# Patient Record
Sex: FEMALE | Race: BLACK OR AFRICAN AMERICAN | Employment: OTHER | URBAN - METROPOLITAN AREA
[De-identification: names, ages, dates, MRNs, and addresses within clinical notes are randomized per-mention and may not be internally consistent; named-entity substitution may affect disease eponyms.]

---

## 2020-04-27 ENCOUNTER — APPOINTMENT (OUTPATIENT)
Dept: CT IMAGING | Age: 70
DRG: 304 | End: 2020-04-27
Attending: EMERGENCY MEDICINE
Payer: MEDICARE

## 2020-04-27 ENCOUNTER — HOSPITAL ENCOUNTER (INPATIENT)
Age: 70
LOS: 11 days | Discharge: NURSING, REHAB OR LONG-TERM CARE | DRG: 304 | End: 2020-05-08
Attending: EMERGENCY MEDICINE | Admitting: FAMILY MEDICINE
Payer: MEDICARE

## 2020-04-27 ENCOUNTER — APPOINTMENT (OUTPATIENT)
Dept: GENERAL RADIOLOGY | Age: 70
DRG: 304 | End: 2020-04-27
Attending: EMERGENCY MEDICINE
Payer: MEDICARE

## 2020-04-27 DIAGNOSIS — R77.8 ELEVATED TROPONIN: ICD-10-CM

## 2020-04-27 DIAGNOSIS — R91.1 LUNG NODULE: ICD-10-CM

## 2020-04-27 DIAGNOSIS — R06.02 SOBOE (SHORTNESS OF BREATH ON EXERTION): ICD-10-CM

## 2020-04-27 DIAGNOSIS — I16.1 HYPERTENSIVE EMERGENCY: Primary | ICD-10-CM

## 2020-04-27 DIAGNOSIS — R94.31 ABNORMAL EKG: ICD-10-CM

## 2020-04-27 DIAGNOSIS — R91.8 LUNG INFILTRATE: ICD-10-CM

## 2020-04-27 LAB
ALBUMIN SERPL-MCNC: 3.2 G/DL (ref 3.4–5)
ALBUMIN/GLOB SERPL: 0.8 {RATIO} (ref 0.8–1.7)
ALP SERPL-CCNC: 68 U/L (ref 45–117)
ALT SERPL-CCNC: 25 U/L (ref 13–56)
ANION GAP SERPL CALC-SCNC: 6 MMOL/L (ref 3–18)
ARTERIAL PATENCY WRIST A: YES
AST SERPL-CCNC: 25 U/L (ref 10–38)
BASE EXCESS BLD CALC-SCNC: 8 MMOL/L
BASOPHILS # BLD: 0 K/UL (ref 0–0.1)
BASOPHILS NFR BLD: 0 % (ref 0–2)
BDY SITE: ABNORMAL
BILIRUB SERPL-MCNC: 0.4 MG/DL (ref 0.2–1)
BNP SERPL-MCNC: 939 PG/ML (ref 0–900)
BUN SERPL-MCNC: 15 MG/DL (ref 7–18)
BUN/CREAT SERPL: 15 (ref 12–20)
CALCIUM SERPL-MCNC: 8.6 MG/DL (ref 8.5–10.1)
CHLORIDE SERPL-SCNC: 103 MMOL/L (ref 100–111)
CK MB CFR SERPL CALC: 1.9 % (ref 0–4)
CK MB SERPL-MCNC: 2.5 NG/ML (ref 5–25)
CK SERPL-CCNC: 130 U/L (ref 26–192)
CO2 SERPL-SCNC: 33 MMOL/L (ref 21–32)
CREAT SERPL-MCNC: 0.98 MG/DL (ref 0.6–1.3)
DIFFERENTIAL METHOD BLD: ABNORMAL
EOSINOPHIL # BLD: 0 K/UL (ref 0–0.4)
EOSINOPHIL NFR BLD: 0 % (ref 0–5)
ERYTHROCYTE [DISTWIDTH] IN BLOOD BY AUTOMATED COUNT: 15.2 % (ref 11.6–14.5)
GAS FLOW.O2 O2 DELIVERY SYS: ABNORMAL L/MIN
GLOBULIN SER CALC-MCNC: 3.9 G/DL (ref 2–4)
GLUCOSE SERPL-MCNC: 152 MG/DL (ref 74–99)
HCO3 BLD-SCNC: 33.2 MMOL/L (ref 22–26)
HCT VFR BLD AUTO: 44.2 % (ref 35–45)
HGB BLD-MCNC: 13.4 G/DL (ref 12–16)
INR PPP: 1 (ref 0.8–1.2)
LYMPHOCYTES # BLD: 2 K/UL (ref 0.9–3.6)
LYMPHOCYTES NFR BLD: 28 % (ref 21–52)
MAGNESIUM SERPL-MCNC: 1.9 MG/DL (ref 1.6–2.6)
MCH RBC QN AUTO: 23.2 PG (ref 24–34)
MCHC RBC AUTO-ENTMCNC: 30.3 G/DL (ref 31–37)
MCV RBC AUTO: 76.6 FL (ref 74–97)
MONOCYTES # BLD: 0.3 K/UL (ref 0.05–1.2)
MONOCYTES NFR BLD: 5 % (ref 3–10)
NEUTS SEG # BLD: 4.7 K/UL (ref 1.8–8)
NEUTS SEG NFR BLD: 67 % (ref 40–73)
O2/TOTAL GAS SETTING VFR VENT: 21 %
PCO2 BLD: 56.6 MMHG (ref 35–45)
PH BLD: 7.38 [PH] (ref 7.35–7.45)
PLATELET # BLD AUTO: 359 K/UL (ref 135–420)
PMV BLD AUTO: 10.1 FL (ref 9.2–11.8)
PO2 BLD: 63 MMHG (ref 80–100)
POTASSIUM SERPL-SCNC: 3.1 MMOL/L (ref 3.5–5.5)
PROT SERPL-MCNC: 7.1 G/DL (ref 6.4–8.2)
PROTHROMBIN TIME: 12.9 SEC (ref 11.5–15.2)
RBC # BLD AUTO: 5.77 M/UL (ref 4.2–5.3)
SAO2 % BLD: 90 % (ref 92–97)
SERVICE CMNT-IMP: ABNORMAL
SODIUM SERPL-SCNC: 142 MMOL/L (ref 136–145)
SPECIMEN TYPE: ABNORMAL
TOTAL RESP. RATE, ITRR: 31
TROPONIN I BLD-MCNC: <0.04 NG/ML (ref 0–0.08)
TROPONIN I SERPL-MCNC: 0.05 NG/ML (ref 0–0.04)
WBC # BLD AUTO: 7.1 K/UL (ref 4.6–13.2)

## 2020-04-27 PROCEDURE — 71250 CT THORAX DX C-: CPT

## 2020-04-27 PROCEDURE — 71045 X-RAY EXAM CHEST 1 VIEW: CPT

## 2020-04-27 PROCEDURE — 96375 TX/PRO/DX INJ NEW DRUG ADDON: CPT

## 2020-04-27 PROCEDURE — 82803 BLOOD GASES ANY COMBINATION: CPT

## 2020-04-27 PROCEDURE — 70450 CT HEAD/BRAIN W/O DYE: CPT

## 2020-04-27 PROCEDURE — 74011250637 HC RX REV CODE- 250/637: Performed by: EMERGENCY MEDICINE

## 2020-04-27 PROCEDURE — 74011000250 HC RX REV CODE- 250: Performed by: EMERGENCY MEDICINE

## 2020-04-27 PROCEDURE — 82550 ASSAY OF CK (CPK): CPT

## 2020-04-27 PROCEDURE — 84484 ASSAY OF TROPONIN QUANT: CPT

## 2020-04-27 PROCEDURE — 85610 PROTHROMBIN TIME: CPT

## 2020-04-27 PROCEDURE — 99285 EMERGENCY DEPT VISIT HI MDM: CPT

## 2020-04-27 PROCEDURE — 74011250637 HC RX REV CODE- 250/637: Performed by: FAMILY MEDICINE

## 2020-04-27 PROCEDURE — 83735 ASSAY OF MAGNESIUM: CPT

## 2020-04-27 PROCEDURE — 80053 COMPREHEN METABOLIC PANEL: CPT

## 2020-04-27 PROCEDURE — 85025 COMPLETE CBC W/AUTO DIFF WBC: CPT

## 2020-04-27 PROCEDURE — 96374 THER/PROPH/DIAG INJ IV PUSH: CPT

## 2020-04-27 PROCEDURE — 93005 ELECTROCARDIOGRAM TRACING: CPT

## 2020-04-27 PROCEDURE — 83880 ASSAY OF NATRIURETIC PEPTIDE: CPT

## 2020-04-27 PROCEDURE — 36600 WITHDRAWAL OF ARTERIAL BLOOD: CPT

## 2020-04-27 PROCEDURE — 77030012890

## 2020-04-27 PROCEDURE — 65660000000 HC RM CCU STEPDOWN

## 2020-04-27 PROCEDURE — 36415 COLL VENOUS BLD VENIPUNCTURE: CPT

## 2020-04-27 PROCEDURE — 74011250636 HC RX REV CODE- 250/636: Performed by: FAMILY MEDICINE

## 2020-04-27 PROCEDURE — 74011250636 HC RX REV CODE- 250/636: Performed by: EMERGENCY MEDICINE

## 2020-04-27 RX ORDER — CLONIDINE HYDROCHLORIDE 0.1 MG/1
0.1 TABLET ORAL
Status: DISCONTINUED | OUTPATIENT
Start: 2020-04-27 | End: 2020-04-27

## 2020-04-27 RX ORDER — NICARDIPINE HYDROCHLORIDE 0.1 MG/ML
5-15 INJECTION INTRAVENOUS
Status: DISCONTINUED | OUTPATIENT
Start: 2020-04-27 | End: 2020-04-27

## 2020-04-27 RX ORDER — HEPARIN SODIUM 5000 [USP'U]/ML
5000 INJECTION, SOLUTION INTRAVENOUS; SUBCUTANEOUS EVERY 8 HOURS
Status: DISCONTINUED | OUTPATIENT
Start: 2020-04-27 | End: 2020-05-08 | Stop reason: HOSPADM

## 2020-04-27 RX ORDER — HYDRALAZINE HYDROCHLORIDE 20 MG/ML
20 INJECTION INTRAMUSCULAR; INTRAVENOUS
Status: COMPLETED | OUTPATIENT
Start: 2020-04-27 | End: 2020-04-27

## 2020-04-27 RX ORDER — AMLODIPINE BESYLATE 5 MG/1
10 TABLET ORAL DAILY
Status: DISCONTINUED | OUTPATIENT
Start: 2020-04-27 | End: 2020-04-30

## 2020-04-27 RX ORDER — LABETALOL HCL 20 MG/4 ML
20 SYRINGE (ML) INTRAVENOUS
Status: DISCONTINUED | OUTPATIENT
Start: 2020-04-27 | End: 2020-04-30

## 2020-04-27 RX ORDER — SODIUM CHLORIDE 0.9 % (FLUSH) 0.9 %
5-40 SYRINGE (ML) INJECTION AS NEEDED
Status: DISCONTINUED | OUTPATIENT
Start: 2020-04-27 | End: 2020-05-08 | Stop reason: HOSPADM

## 2020-04-27 RX ORDER — FUROSEMIDE 10 MG/ML
40 INJECTION INTRAMUSCULAR; INTRAVENOUS
Status: COMPLETED | OUTPATIENT
Start: 2020-04-27 | End: 2020-04-27

## 2020-04-27 RX ORDER — SODIUM CHLORIDE 0.9 % (FLUSH) 0.9 %
5-40 SYRINGE (ML) INJECTION EVERY 8 HOURS
Status: DISCONTINUED | OUTPATIENT
Start: 2020-04-27 | End: 2020-05-08 | Stop reason: HOSPADM

## 2020-04-27 RX ORDER — ONDANSETRON 2 MG/ML
4 INJECTION INTRAMUSCULAR; INTRAVENOUS
Status: DISCONTINUED | OUTPATIENT
Start: 2020-04-27 | End: 2020-05-08 | Stop reason: HOSPADM

## 2020-04-27 RX ADMIN — AMLODIPINE BESYLATE 10 MG: 5 TABLET ORAL at 20:20

## 2020-04-27 RX ADMIN — FUROSEMIDE 40 MG: 10 INJECTION, SOLUTION INTRAMUSCULAR; INTRAVENOUS at 18:17

## 2020-04-27 RX ADMIN — HEPARIN SODIUM 5000 UNITS: 5000 INJECTION INTRAVENOUS; SUBCUTANEOUS at 23:10

## 2020-04-27 RX ADMIN — NITROGLYCERIN 1 INCH: 20 OINTMENT TOPICAL at 16:32

## 2020-04-27 RX ADMIN — HYDRALAZINE HYDROCHLORIDE 20 MG: 20 INJECTION INTRAMUSCULAR; INTRAVENOUS at 17:58

## 2020-04-27 RX ADMIN — LOSARTAN POTASSIUM: 50 TABLET, FILM COATED ORAL at 20:25

## 2020-04-27 RX ADMIN — NICARDIPINE HYDROCHLORIDE 10 MG/HR: 0.1 INJECTION, SOLUTION INTRAVENOUS at 20:07

## 2020-04-27 RX ADMIN — NICARDIPINE HYDROCHLORIDE 7.5 MG/HR: 0.1 INJECTION, SOLUTION INTRAVENOUS at 20:32

## 2020-04-27 RX ADMIN — AZITHROMYCIN DIHYDRATE 500 MG: 500 INJECTION, POWDER, LYOPHILIZED, FOR SOLUTION INTRAVENOUS at 21:00

## 2020-04-27 RX ADMIN — NICARDIPINE HYDROCHLORIDE 5 MG/HR: 0.1 INJECTION, SOLUTION INTRAVENOUS at 19:23

## 2020-04-27 RX ADMIN — NICARDIPINE HYDROCHLORIDE 7.5 MG/HR: 0.1 INJECTION, SOLUTION INTRAVENOUS at 19:46

## 2020-04-27 RX ADMIN — NICARDIPINE HYDROCHLORIDE 12.5 MG/HR: 0.1 INJECTION, SOLUTION INTRAVENOUS at 19:57

## 2020-04-27 RX ADMIN — NICARDIPINE HYDROCHLORIDE 10 MG/HR: 0.1 INJECTION, SOLUTION INTRAVENOUS at 19:52

## 2020-04-27 RX ADMIN — CEFTRIAXONE 2 G: 2 INJECTION, POWDER, FOR SOLUTION INTRAMUSCULAR; INTRAVENOUS at 18:37

## 2020-04-27 NOTE — ED NOTES
Bedside and Verbal shift change report given to Jony Roger (oncoming nurse) by Lisa Simons (offgoing nurse). Report included the following information SBAR, ED Summary and MAR.

## 2020-04-27 NOTE — H&P
History & Physical    Patient: Patric Figueredo MRN: [id number]  CSN: [id number]    YOB: 1950  Age: 81 y.o. Sex: female      DOA: 4/27/2020  Primary Care Provider:  Sharla Magana MD      Assessment/Plan   82 y/o female with h/o hypertension, degenerative disc disease, obesity presents to the ED with SOB and MO admitted for hypertensive emergency, elevated troponin, lung nodule and abnormal chest xray suggestive of PNA. Hypertensive emergency   Nitropaste, lasix and hydralazine given in ED, but BP still uncontrolled   Started on Cardene drip and admit to telemetry when blood pressure improves  Goal of avoiding ICU   Restarted home antihypertensives  Head CT negative  Will monitor blood pressure     Elevated troponin   Trend cardiac enzymes, if elevated, will consult cardiology   Likely cardiac strain due to severely elevated blood pressure 218/122 vs ACS    PNA -presumptive   Abnl CXR -Increased interstitial lung markings which may be secondary to  shallow inspiration or mild pulmonary edema. Right lung base atelectasis  Abnl CT scan -There is right lower lobe atelectasis and/or infiltrate. Elevated right  hemidiaphragm  Rocephin and Azithromycin ordered, but apparent allergic reaction to rocephin so discontinued   Will start levaquin     Hypokalemia -3.1  Replete and recheck     Lung nodule   13 x 12 mm nodular density in the right middle lobe   Will recheck after treating for CAP    Rocephin allergy -flushing and discomfort after start of admin of rocephin     DVT Prophylaxis -Heparin       Patient Active Problem List   Diagnosis Code    Elevated troponin R79.89    Hypertensive emergency I16.1       Estimated length of stay : 2-3 days    CC: SOB       HPI:     Derrick Morales is a 81 y.o. female with h/o hypertension, degenerative disc disease, and obesity presents to the emergency department c/o worsening shortness of breath x1 week.   Patient reports that shortness of breath is worse with exertion and activities of daily living are becoming increasingly more difficult. SOB also is worse when she lays flat, has to sleep on 2-3 pillows. Pt denies chest pain, abdominal pain, fever, nausea, vomiting, and any other sxs or complaints. Denies any lower extremity edema. Denies any history of CHF. Past Medical History:   Diagnosis Date    Hypertension        Past Surgical History:   Procedure Laterality Date    HX HIP REPLACEMENT      left hip       History reviewed. No pertinent family history. Social History     Socioeconomic History    Marital status: SINGLE     Spouse name: Not on file    Number of children: Not on file    Years of education: Not on file    Highest education level: Not on file   Tobacco Use    Smoking status: Never Smoker    Smokeless tobacco: Never Used   Substance and Sexual Activity    Alcohol use: No    Drug use: No       Prior to Admission medications    Medication Sig Start Date End Date Taking? Authorizing Provider   traMADol (ULTRAM) 50 mg tablet Take 1 Tab by mouth every six (6) hours as needed for Pain. Max Daily Amount: 200 mg. 8/1/16   Lysle Lanes, PA [facility] [place]   Liliya Villegas DO   losartan 50 mg tab 100 mg, hydrochlorothiazide 25 mg tab 25 mg Take  by mouth daily. Other, MD Kennedy   amLODIPine (NORVASC) 10 mg tablet Take  by mouth daily. Kennedy Dang MD   meloxicam (MOBIC) 15 mg tablet Take 15 mg by mouth daily. Kennedy Dang MD       Allergies   Allergen Reactions    Rocephin [Ceftriaxone] Other (comments)     Flushing       Review of Systems  Gen: No fever, chills, malaise, weight loss/gain. Heent: No headache, rhinorrhea, epistaxis, ear pain, hearing loss, sinus pain, neck pain/stiffness, sore throat. Heart: No chest pain, palpitations, +MO, pnd, or orthopnea. Resp: No cough, hemoptysis, wheezing and +shortness of breath.    GI: No nausea, vomiting, diarrhea, constipation, melena or hematochezia. : No urinary obstruction, dysuria or hematuria. Derm: No rash, new skin lesion or pruritis. Musc/skeletal: no bone or joint complains. Vasc: No edema, cyanosis or claudication. Endo: No heat/cold intolerance, no polyuria,polydipsia or polyphagia. Neuro: No unilateral weakness, numbness, tingling. No seizures. Heme: No easy bruising or bleeding. Physical Exam:     Physical Exam:  Visit Vitals  BP (!) 153/103   Pulse (!) 114   Temp 98.5 °F (36.9 °C)   Resp 22   Ht 5' 5\" (1.651 m)   Wt 108.9 kg (240 lb)   SpO2 96%   BMI 39.94 kg/m²    O2 Flow Rate (L/min): 3 l/min O2 Device: Nasal cannula    Temp (24hrs), Av.5 °F (36.9 °C), Min:98.5 °F (36.9 °C), Max:98.5 °F (36.9 °C)    No intake/output data recorded. No intake/output data recorded. General:  Well developed, obese BF, awake, cooperative, no distress. Head:  Normocephalic, without obvious abnormality, atraumatic. Eyes:  Conjunctivae/corneas clear, sclera anicteric, PERRL, EOMs intact. Nose: Nares normal. No drainage or sinus tenderness. Throat: Lips, mucosa, and tongue normal.    Neck: Supple, symmetrical, trachea midline, no adenopathy. Lungs:   Clear to auscultation bilaterally. Heart:  Regular rate and rhythm, S1, S2 normal, no murmur, click, rub or gallop. Abdomen: Soft, non-tender. Bowel sounds normal. No masses,  No organomegaly. Extremities: Extremities normal, atraumatic, no cyanosis or edema. Capillary refill normal.   Pulses: 2+ and symmetric all extremities. Skin: Skin color appropriate for ethnicity, turgor normal. No rashes or lesions   Neurologic: CNII-XII intact. No focal motor or sensory deficit.        Labs Reviewed:  Recent Results (from the past 24 hour(s))   CBC WITH AUTOMATED DIFF    Collection Time: 20  4:30 PM   Result Value Ref Range    WBC 7.1 4.6 - 13.2 K/uL    RBC 5.77 (H) 4.20 - 5.30 M/uL    HGB 13.4 12.0 - 16.0 g/dL    HCT 44.2 35.0 - 45.0 %    MCV 76.6 74.0 - 97.0 FL    MCH 23.2 (L) 24.0 - 34.0 PG    MCHC 30.3 (L) 31.0 - 37.0 g/dL    RDW 15.2 (H) 11.6 - 14.5 %    PLATELET 560 817 - 109 K/uL    MPV 10.1 9.2 - 11.8 FL    NEUTROPHILS 67 40 - 73 %    LYMPHOCYTES 28 21 - 52 %    MONOCYTES 5 3 - 10 %    EOSINOPHILS 0 0 - 5 %    BASOPHILS 0 0 - 2 %    ABS. NEUTROPHILS 4.7 1.8 - 8.0 K/UL    ABS. LYMPHOCYTES 2.0 0.9 - 3.6 K/UL    ABS. MONOCYTES 0.3 0.05 - 1.2 K/UL    ABS. EOSINOPHILS 0.0 0.0 - 0.4 K/UL    ABS. BASOPHILS 0.0 0.0 - 0.1 K/UL    DF AUTOMATED     METABOLIC PANEL, COMPREHENSIVE    Collection Time: 04/27/20  4:30 PM   Result Value Ref Range    Sodium 142 136 - 145 mmol/L    Potassium 3.1 (L) 3.5 - 5.5 mmol/L    Chloride 103 100 - 111 mmol/L    CO2 33 (H) 21 - 32 mmol/L    Anion gap 6 3.0 - 18 mmol/L    Glucose 152 (H) 74 - 99 mg/dL    BUN 15 7.0 - 18 MG/DL    Creatinine 0.98 0.6 - 1.3 MG/DL    BUN/Creatinine ratio 15 12 - 20      GFR est AA >60 >60 ml/min/1.73m2    GFR est non-AA 57 (L) >60 ml/min/1.73m2    Calcium 8.6 8.5 - 10.1 MG/DL    Bilirubin, total 0.4 0.2 - 1.0 MG/DL    ALT (SGPT) 25 13 - 56 U/L    AST (SGOT) 25 10 - 38 U/L    Alk.  phosphatase 68 45 - 117 U/L    Protein, total 7.1 6.4 - 8.2 g/dL    Albumin 3.2 (L) 3.4 - 5.0 g/dL    Globulin 3.9 2.0 - 4.0 g/dL    A-G Ratio 0.8 0.8 - 1.7     NT-PRO BNP    Collection Time: 04/27/20  4:30 PM   Result Value Ref Range    NT pro- (H) 0 - 900 PG/ML   CARDIAC PANEL,(CK, CKMB & TROPONIN)    Collection Time: 04/27/20  4:30 PM   Result Value Ref Range     26 - 192 U/L    CK - MB 2.5 <3.6 ng/ml    CK-MB Index 1.9 0.0 - 4.0 %    Troponin-I, QT 0.05 (H) 0.0 - 0.045 NG/ML   PROTHROMBIN TIME + INR    Collection Time: 04/27/20  4:30 PM   Result Value Ref Range    Prothrombin time 12.9 11.5 - 15.2 sec    INR 1.0 0.8 - 1.2     MAGNESIUM    Collection Time: 04/27/20  4:30 PM   Result Value Ref Range    Magnesium 1.9 1.6 - 2.6 mg/dL   EKG, 12 LEAD, INITIAL    Collection Time: 04/27/20  4:31 PM   Result Value Ref Range Ventricular Rate 123 BPM    Atrial Rate 123 BPM    P-R Interval 144 ms    QRS Duration 136 ms    Q-T Interval 336 ms    QTC Calculation (Bezet) 481 ms    Calculated P Axis 104 degrees    Calculated R Axis -58 degrees    Calculated T Axis 70 degrees    Diagnosis       Sinus tachycardia with premature atrial complexes  Right bundle branch block  Left anterior fascicular block  Bifascicular block  Abnormal ECG  When compared with ECG of 28-JAN-2014 16:09,  premature atrial complexes are now present  (RBBB and left anterior fascicular block) has replaced Incomplete right   bundle branch block     POC TROPONIN-I    Collection Time: 04/27/20  4:38 PM   Result Value Ref Range    Troponin-I (POC) <0.04 0.00 - 0.08 ng/mL   POC G3    Collection Time: 04/27/20  5:11 PM   Result Value Ref Range    Device: ROOM AIR      FIO2 (POC) 21 %    pH (POC) 7.376 7.35 - 7.45      pCO2 (POC) 56.6 (H) 35.0 - 45.0 MMHG    pO2 (POC) 63 (L) 80 - 100 MMHG    HCO3 (POC) 33.2 (H) 22 - 26 MMOL/L    sO2 (POC) 90 (L) 92 - 97 %    Base excess (POC) 8 mmol/L    Allens test (POC) YES      Total resp.  rate 31      Site RIGHT RADIAL      Specimen type (POC) ARTERIAL      Performed by Eddi Huertas        Procedures/imaging: see electronic medical records for all procedures/Xrays and details which were not copied into this note but were reviewed prior to creation of Plan      CC: Imelda Shultz MD

## 2020-04-27 NOTE — ED PROVIDER NOTES
EMERGENCY DEPARTMENT HISTORY AND PHYSICAL EXAM    Date: 4/27/2020  Patient Name: Jovan Morales    History of Presenting Illness     Chief Complaint   Patient presents with    Shortness of Breath         History Provided By: Patient    Additional History (Context):   Jovan Morales is a 81 y.o. female with PMHX hypertension presents to the emergency department C/O worsening shortness of breath x1 week. Patient reports that shortness of breath is worse with exertion and worse when she lays flat. Pt denies chest pain, abdominal pain, fever, nausea, vomiting, and any other sxs or complaints. Denies any lower extremity edema. Denies any history of CHF. PCP: Terrence Bran MD    Current Facility-Administered Medications   Medication Dose Route Frequency Provider Last Rate Last Dose    cefTRIAXone (ROCEPHIN) 2 g in sterile water (preservative free) 20 mL IV syringe  2 g IntraVENous Q24H Norma Samayoa DO        azithromycin (ZITHROMAX) 500 mg in 0.9% sodium chloride 250 mL (VIAL-MATE)  500 mg IntraVENous Q24H Norma Samayoa DO        cloNIDine HCL (CATAPRES) tablet 0.1 mg  0.1 mg Oral NOW Kacie Samayoa, DO        niCARdipine in Saline (CARDENE) 0.1mg/mL 200 ml premix infusion  5-15 mg/hr IntraVENous TITRATE Kacie Samayoa, DO         Current Outpatient Medications   Medication Sig Dispense Refill    traMADol (ULTRAM) 50 mg tablet Take 1 Tab by mouth every six (6) hours as needed for Pain. Max Daily Amount: 200 mg. 12 Tab 0    Walker (AIRGO ROLLING WALKER) misc RW 1 Each 0    losartan 50 mg tab 100 mg, hydrochlorothiazide 25 mg tab 25 mg Take  by mouth daily.  amLODIPine (NORVASC) 10 mg tablet Take  by mouth daily.  meloxicam (MOBIC) 15 mg tablet Take 15 mg by mouth daily.          Past History     Past Medical History:  Past Medical History:   Diagnosis Date    Hypertension        Past Surgical History:  Past Surgical History: Procedure Laterality Date    HX HIP REPLACEMENT      left hip       Family History:  History reviewed. No pertinent family history. Social History:  Social History     Tobacco Use    Smoking status: Never Smoker    Smokeless tobacco: Never Used   Substance Use Topics    Alcohol use: No    Drug use: No       Allergies:  No Known Allergies      Review of Systems   Review of Systems   Constitutional: Negative for chills and fever. HENT: Negative for congestion, ear pain, sinus pain and sore throat. Eyes: Negative for pain and visual disturbance. Respiratory: Positive for shortness of breath. Negative for cough. Cardiovascular: Negative for chest pain and leg swelling. Gastrointestinal: Negative for abdominal pain, constipation, diarrhea, nausea and vomiting. Genitourinary: Negative for dysuria, hematuria, vaginal bleeding and vaginal discharge. Musculoskeletal: Negative for back pain and neck pain. Skin: Negative for rash and wound. Neurological: Negative for dizziness, tremors, weakness, light-headedness and numbness. All other systems reviewed and are negative.       Physical Exam     Vitals:    04/27/20 1715 04/27/20 1745 04/27/20 1758 04/27/20 1817   BP: (!) 210/141 (!) 209/115 (!) 209/115 (!) 218/122   Pulse: (!) 115 (!) 113 (!) 117 (!) 115   Resp: 29 25     Temp:       SpO2: 94% 95%     Weight:       Height:         Physical Exam    Nursing note and vitals reviewed    Constitutional: Elderly -American female, in moderate distress, dyspneic and tachypneic  Head: Normocephalic, Atraumatic  Eyes: Pupils are equal, round, and reactive to light, EOMI  Neck: Supple, non-tender  Cardiovascular: Tachycardic, no murmurs, rubs, or gallops  Chest: Dyspneic and tachypneic but with symmetrical chest excursion bilaterally  Lungs: Diminished breath sounds with bibasilar crackles  Abdomen: Soft, non tender, non distended, normoactive bowel sounds  Back: No evidence of trauma or deformity  Extremities: No evidence of trauma or deformity, no LE edema. No streaking erythema, vesicular lesions, ulcerations or bulla  Skin: Warm and dry, normal cap refill  Neuro: Alert and appropriate, CN intact, normal speech, moving all 4 extremities freely and symmetrically  Psychiatric: Normal mood and affect       Diagnostic Study Results     Labs -     Recent Results (from the past 12 hour(s))   CBC WITH AUTOMATED DIFF    Collection Time: 04/27/20  4:30 PM   Result Value Ref Range    WBC 7.1 4.6 - 13.2 K/uL    RBC 5.77 (H) 4.20 - 5.30 M/uL    HGB 13.4 12.0 - 16.0 g/dL    HCT 44.2 35.0 - 45.0 %    MCV 76.6 74.0 - 97.0 FL    MCH 23.2 (L) 24.0 - 34.0 PG    MCHC 30.3 (L) 31.0 - 37.0 g/dL    RDW 15.2 (H) 11.6 - 14.5 %    PLATELET 550 200 - 339 K/uL    MPV 10.1 9.2 - 11.8 FL    NEUTROPHILS 67 40 - 73 %    LYMPHOCYTES 28 21 - 52 %    MONOCYTES 5 3 - 10 %    EOSINOPHILS 0 0 - 5 %    BASOPHILS 0 0 - 2 %    ABS. NEUTROPHILS 4.7 1.8 - 8.0 K/UL    ABS. LYMPHOCYTES 2.0 0.9 - 3.6 K/UL    ABS. MONOCYTES 0.3 0.05 - 1.2 K/UL    ABS. EOSINOPHILS 0.0 0.0 - 0.4 K/UL    ABS. BASOPHILS 0.0 0.0 - 0.1 K/UL    DF AUTOMATED     METABOLIC PANEL, COMPREHENSIVE    Collection Time: 04/27/20  4:30 PM   Result Value Ref Range    Sodium 142 136 - 145 mmol/L    Potassium 3.1 (L) 3.5 - 5.5 mmol/L    Chloride 103 100 - 111 mmol/L    CO2 33 (H) 21 - 32 mmol/L    Anion gap 6 3.0 - 18 mmol/L    Glucose 152 (H) 74 - 99 mg/dL    BUN 15 7.0 - 18 MG/DL    Creatinine 0.98 0.6 - 1.3 MG/DL    BUN/Creatinine ratio 15 12 - 20      GFR est AA >60 >60 ml/min/1.73m2    GFR est non-AA 57 (L) >60 ml/min/1.73m2    Calcium 8.6 8.5 - 10.1 MG/DL    Bilirubin, total 0.4 0.2 - 1.0 MG/DL    ALT (SGPT) 25 13 - 56 U/L    AST (SGOT) 25 10 - 38 U/L    Alk.  phosphatase 68 45 - 117 U/L    Protein, total 7.1 6.4 - 8.2 g/dL    Albumin 3.2 (L) 3.4 - 5.0 g/dL    Globulin 3.9 2.0 - 4.0 g/dL    A-G Ratio 0.8 0.8 - 1.7     NT-PRO BNP    Collection Time: 04/27/20  4:30 PM Result Value Ref Range    NT pro- (H) 0 - 900 PG/ML   CARDIAC PANEL,(CK, CKMB & TROPONIN)    Collection Time: 04/27/20  4:30 PM   Result Value Ref Range     26 - 192 U/L    CK - MB 2.5 <3.6 ng/ml    CK-MB Index 1.9 0.0 - 4.0 %    Troponin-I, QT 0.05 (H) 0.0 - 0.045 NG/ML   PROTHROMBIN TIME + INR    Collection Time: 04/27/20  4:30 PM   Result Value Ref Range    Prothrombin time 12.9 11.5 - 15.2 sec    INR 1.0 0.8 - 1.2     MAGNESIUM    Collection Time: 04/27/20  4:30 PM   Result Value Ref Range    Magnesium 1.9 1.6 - 2.6 mg/dL   EKG, 12 LEAD, INITIAL    Collection Time: 04/27/20  4:31 PM   Result Value Ref Range    Ventricular Rate 123 BPM    Atrial Rate 123 BPM    P-R Interval 144 ms    QRS Duration 136 ms    Q-T Interval 336 ms    QTC Calculation (Bezet) 481 ms    Calculated P Axis 104 degrees    Calculated R Axis -58 degrees    Calculated T Axis 70 degrees    Diagnosis       Sinus tachycardia with premature atrial complexes  Right bundle branch block  Left anterior fascicular block  Bifascicular block  Abnormal ECG  When compared with ECG of 28-JAN-2014 16:09,  premature atrial complexes are now present  (RBBB and left anterior fascicular block) has replaced Incomplete right   bundle branch block     POC TROPONIN-I    Collection Time: 04/27/20  4:38 PM   Result Value Ref Range    Troponin-I (POC) <0.04 0.00 - 0.08 ng/mL   POC G3    Collection Time: 04/27/20  5:11 PM   Result Value Ref Range    Device: ROOM AIR      FIO2 (POC) 21 %    pH (POC) 7.376 7.35 - 7.45      pCO2 (POC) 56.6 (H) 35.0 - 45.0 MMHG    pO2 (POC) 63 (L) 80 - 100 MMHG    HCO3 (POC) 33.2 (H) 22 - 26 MMOL/L    sO2 (POC) 90 (L) 92 - 97 %    Base excess (POC) 8 mmol/L    Allens test (POC) YES      Total resp. rate 31      Site RIGHT RADIAL      Specimen type (POC) ARTERIAL      Performed by Gabby Layton        Radiologic Studies -   CT HEAD WO CONT   Final Result   IMPRESSION:      No acute intracranial abnormalities.       CT CHEST WO CONT   Final Result   IMPRESSION:      There is right lower lobe atelectasis and/or infiltrate. Elevated right   hemidiaphragm. Enlarged left thyroid lobe with possible underlying nodule substernal extension. Advise thyroid ultrasound for further evaluation on a nonemergent basis. There is a 13 x 12 mm nodular density in the right middle lobe which may   represent round atelectasis or pneumonia however underlying pulmonary nodule is   not excluded. Follow-up as per Fleischner Society protocol.      ========      Fleischner Society Pulmonary Nodule Guidelines (revised 2017): Solid nodule >8 mm: Consider CT, PET CT, or tissue sampling at 3 months. XR CHEST PORT    (Results Pending)     CT Results  (Last 48 hours)               04/27/20 1700  CT HEAD WO CONT Final result    Impression:  IMPRESSION:       No acute intracranial abnormalities. Narrative:  EXAM: CT head       INDICATION: Hypertension emergency       COMPARISON: January 28, 2014       TECHNIQUE: Axial CT imaging of the head was performed without intravenous   contrast. One or more dose reduction techniques were used on this CT: automated   exposure control, adjustment of the mAs and/or kVp according to patient size,   and iterative reconstruction techniques. The specific techniques used on this   CT exam have been documented in the patient's electronic medical record. Digital   Imaging and Communications in Medicine (DICOM) format image data are available   to nonaffiliated external healthcare facilities or entities on a secure, media   free, reciprocally searchable basis with patient authorization for at least a   12-month period after this study. _______________       FINDINGS:       BRAIN AND POSTERIOR FOSSA: The sulci, folia, ventricles and basal cisterns are   within normal limits for the patient's age. There is no intracranial hemorrhage,   mass effect, or midline shift.  There are no areas of abnormal parenchymal   attenuation. EXTRA-AXIAL SPACES AND MENINGES: There are no abnormal extra-axial fluid   collections. CALVARIUM: Intact. SINUSES: Clear. OTHER: None.       _______________           04/27/20 1659  CT CHEST WO CONT Final result    Impression:  IMPRESSION:       There is right lower lobe atelectasis and/or infiltrate. Elevated right   hemidiaphragm. Enlarged left thyroid lobe with possible underlying nodule substernal extension. Advise thyroid ultrasound for further evaluation on a nonemergent basis. There is a 13 x 12 mm nodular density in the right middle lobe which may   represent round atelectasis or pneumonia however underlying pulmonary nodule is   not excluded. Follow-up as per Fleischner Society protocol.       ========       Fleischner Society Pulmonary Nodule Guidelines (revised 2017): Solid nodule >8 mm: Consider CT, PET CT, or tissue sampling at 3 months. Narrative:  EXAM: CT chest        INDICATION: Shortness of breath       COMPARISON: August 4, 2019       TECHNIQUE: Axial CT imaging from the thoracic inlet through the diaphragm   without intravenous contrast. Multiplanar reformats were generated. One or more   dose reduction techniques were used on this CT: automated exposure control,   adjustment of the mAs and/or kVp according to patient size, and iterative   reconstruction techniques. The specific techniques used on this CT exam have   been documented in the patient's electronic medical record. Digital Imaging and   Communications in Medicine (DICOM) format image data are available to   nonaffiliated external healthcare facilities or entities on a secure, media   free, reciprocally searchable basis with patient authorization for at least a   12-month period after this study. _______________       FINDINGS:       THYROID GLAND: There is an enlarged left thyroid lobe with substernal extension   measuring 3.8 x 3.3 cm. LYMPH NODES: No enlarged lymph nodes seen. PLEURA: There are no pleural effusion. HEART: Normal without pericardial effusion. Moderate calcific coronary artery   disease present. VASCULATURE/MEDIASTINUM: Mild to moderate calcific atherosclerosis present. There is a small hiatal hernia. LUNGS: There is right lower lobe atelectasis and/or infiltrate. There is a 13 x   12 mm nodular density in the right middle lobe which may represent round   atelectasis or pneumonia however only nodule not excluded. AIRWAY: Normal.       UPPER ABDOMEN: There is a right renal cyst measuring 2.4 cm long the midpole. Otherwise the visualized solid organs are unremarkable. Post gastric bypass   changes present. OTHER: No acute or aggressive osseous abnormalities identified. Elevated right hemidiaphragm. _______________               CXR Results  (Last 48 hours)    None            Medical Decision Making   I am the first provider for this patient. I reviewed the vital signs, available nursing notes, past medical history, past surgical history, family history and social history. Vital Signs-Reviewed the patient's vital signs. Pulse Oximetry Analysis -90 % on room air    Cardiac Monitor:  Rate: 118 bpm  Rhythm: Regular    EKG interpretation: (Preliminary)  4:21 PM   Sinus tachycardia with PACs 120 bpm.  QTc 481 ms. Bifascicular block. Right bundle branch block seen on prior EKG in January 2014. No acute ST elevation    Records Reviewed: Nursing Notes and Old Medical Records    Provider Notes:   81 y.o. female presenting with shortness of breath. On presentation, patient noted in moderate distress. She is 90% on room air, hypoxic. She is dyspneic and tachypneic. She is morbidly obese with diminished breath sounds and crackles. No lower extremity edema noted. Patient also noted to be very hypertensive. Patient's exam and history concerning for possible pulmonary edema. Will start an inch of Nitropaste. Will check chest x-ray and BNP. Due to her elevated blood pressure, will also obtain CT scan of her head. Due to her severe dyspnea, hypoxia, will require admission. Procedures:  Procedures    ED Course:   4:21 PM   Initial assessment performed. The patients presenting problems have been discussed, and they are in agreement with the care plan formulated and outlined with them. I have encouraged them to ask questions as they arise throughout their visit. 6:37 PM  Patient noted to have a slightly elevated troponin of 0.05. . However CT scan showing no pulmonary edema. Possible atelectasis versus infiltrate. However patient afebrile with no leukocytosis or bandemia. Will cover for community-acquired pneumonia with antibiotics. However likely patient's hypoxia and shortness of breath secondary to hypertensive emergency as patient with persistent elevated blood pressure despite Nitropaste, Lasix and hydralazine. Will start the patient on Cardene drip and admit to telemetry when blood pressure improves. Diagnosis and Disposition     6:39 PM  I have spent 75 minutes of critical care time involved in lab review, consultations with specialist, family decision-making, and documentation. During this entire length of time I was immediately available to the patient. Critical Care: The reason for providing this level of medical care for this critically ill patient was due a critical illness that impaired one or more vital organ systems such that there was a high probability of imminent or life threatening deterioration in the patients condition. This care involved high complexity decision making to assess, manipulate, and support vital system functions, to treat this degreee vital organ system failure and to prevent further life threatening deterioration of the patients condition. Core Measures:  For Hospitalized Patients:    1.  Hospitalization Decision Time:  The decision to hospitalize the patient was made by Rai Lew DO at 6:39 PM on 4/27/2020    2. Aspirin: Aspirin was not given because the patient did not present with a stroke at the time of their Emergency Department evaluation    4:38 PM  Patient is being admitted to the hospital by Dr. Clary Ortiz. The results of their tests and reasons for their admission have been discussed with them and/or available family. They convey agreement and understanding for the need to be admitted and for their admission diagnosis. CONDITIONS ON ADMISSION:  Sepsis is not present at the time of admission. Pneumonia is present at the time of admission. MRSA is not present at the time of admission. Wound infection is not present at the time of admission. Pressure Ulcer is not present at the time of admission. CLINICAL IMPRESSION:    1. Hypertensive emergency    2. Elevated troponin    3. Lung nodule    4. Lung infiltrate      ____________________________________     Please note that this dictation was completed with surespot, the computer voice recognition software. Quite often unanticipated grammatical, syntax, homophones, and other interpretive errors are inadvertently transcribed by the computer software. Please disregard these errors. Please excuse any errors that have escaped final proofreading.

## 2020-04-27 NOTE — ED NOTES
While administering rocephin, patient began to \"feel bad. \" Patient stated she was flushed and hot. Hospitalist at bedside. Rocephin discontinued after approximately half dose administered. Providers notified.

## 2020-04-27 NOTE — ED TRIAGE NOTES
Patient brought to ED via EMS from [facility] with c/o increased SOB over the last 2-3 days. Patient has difficulty speaking due to shortness of breath.  Patient denies cough or recent illness

## 2020-04-28 ENCOUNTER — APPOINTMENT (OUTPATIENT)
Dept: NON INVASIVE DIAGNOSTICS | Age: 70
DRG: 304 | End: 2020-04-28
Attending: FAMILY MEDICINE
Payer: MEDICARE

## 2020-04-28 PROBLEM — J18.9 CAP (COMMUNITY ACQUIRED PNEUMONIA): Status: ACTIVE | Noted: 2020-04-28

## 2020-04-28 PROBLEM — E87.6 HYPOKALEMIA: Status: ACTIVE | Noted: 2020-04-28

## 2020-04-28 PROBLEM — R91.1 LUNG NODULE: Status: ACTIVE | Noted: 2020-04-28

## 2020-04-28 LAB
ALBUMIN SERPL-MCNC: 3 G/DL (ref 3.4–5)
ALBUMIN/GLOB SERPL: 0.9 {RATIO} (ref 0.8–1.7)
ALP SERPL-CCNC: 57 U/L (ref 45–117)
ALT SERPL-CCNC: 23 U/L (ref 13–56)
ANION GAP SERPL CALC-SCNC: 6 MMOL/L (ref 3–18)
AST SERPL-CCNC: 17 U/L (ref 10–38)
ATRIAL RATE: 123 BPM
AV VELOCITY RATIO: 0.72
AV VTI RATIO: 0.8
BILIRUB SERPL-MCNC: 0.5 MG/DL (ref 0.2–1)
BUN SERPL-MCNC: 17 MG/DL (ref 7–18)
BUN/CREAT SERPL: 18 (ref 12–20)
CALCIUM SERPL-MCNC: 8.2 MG/DL (ref 8.5–10.1)
CALCULATED P AXIS, ECG09: 104 DEGREES
CALCULATED R AXIS, ECG10: -58 DEGREES
CALCULATED T AXIS, ECG11: 70 DEGREES
CHLORIDE SERPL-SCNC: 101 MMOL/L (ref 100–111)
CHOLEST SERPL-MCNC: 174 MG/DL
CK MB CFR SERPL CALC: 2.3 % (ref 0–4)
CK MB CFR SERPL CALC: 2.5 % (ref 0–4)
CK MB CFR SERPL CALC: 2.5 % (ref 0–4)
CK MB SERPL-MCNC: 2.8 NG/ML (ref 5–25)
CK MB SERPL-MCNC: 2.9 NG/ML (ref 5–25)
CK MB SERPL-MCNC: 3.2 NG/ML (ref 5–25)
CK SERPL-CCNC: 117 U/L (ref 26–192)
CK SERPL-CCNC: 120 U/L (ref 26–192)
CK SERPL-CCNC: 130 U/L (ref 26–192)
CO2 SERPL-SCNC: 34 MMOL/L (ref 21–32)
CREAT SERPL-MCNC: 0.96 MG/DL (ref 0.6–1.3)
DIAGNOSIS, 93000: NORMAL
ECHO AO ROOT DIAM: 2.89 CM
ECHO AV AREA PEAK VELOCITY: 2.3 CM2
ECHO AV AREA VTI: 2.6 CM2
ECHO AV AREA/BSA PEAK VELOCITY: 1 CM2/M2
ECHO AV AREA/BSA VTI: 1.2 CM2/M2
ECHO AV MEAN GRADIENT: 5.1 MMHG
ECHO AV MEAN VELOCITY: 1.1 M/S
ECHO AV PEAK GRADIENT: 2 MMHG
ECHO AV PEAK VELOCITY: 70.41 CM/S
ECHO AV VTI: 21.67 CM
ECHO EST RA PRESSURE: 5 MMHG
ECHO LA MAJOR AXIS: 3.05 CM
ECHO LA TO AORTIC ROOT RATIO: 1.06
ECHO LA VOL 2C: 57.64 ML (ref 22–52)
ECHO LA VOL 4C: 87.44 ML (ref 22–52)
ECHO LA VOL BP: 75.87 ML (ref 22–52)
ECHO LA VOL/BSA BIPLANE: 33.5 ML/M2 (ref 16–28)
ECHO LA VOLUME INDEX A2C: 25.45 ML/M2 (ref 16–28)
ECHO LA VOLUME INDEX A4C: 38.61 ML/M2 (ref 16–28)
ECHO LV E' LATERAL VELOCITY: 8 CM/S
ECHO LV E' SEPTAL VELOCITY: 4 CM/S
ECHO LV EDV A2C: 99 ML
ECHO LV EDV A4C: 103.9 ML
ECHO LV EDV BP: 101 ML (ref 56–104)
ECHO LV EDV INDEX A4C: 45.9 ML/M2
ECHO LV EDV INDEX BP: 44.6 ML/M2
ECHO LV EDV NDEX A2C: 43.7 ML/M2
ECHO LV EDV TEICHHOLZ: 0.73 ML
ECHO LV EJECTION FRACTION A2C: 60 %
ECHO LV EJECTION FRACTION A4C: 54 %
ECHO LV EJECTION FRACTION BIPLANE: 54.1 % (ref 55–100)
ECHO LV ESV A2C: 39.7 ML
ECHO LV ESV A4C: 47.8 ML
ECHO LV ESV BP: 46.4 ML (ref 19–49)
ECHO LV ESV INDEX A2C: 17.5 ML/M2
ECHO LV ESV INDEX A4C: 21.1 ML/M2
ECHO LV ESV INDEX BP: 20.5 ML/M2
ECHO LV ESV TEICHHOLZ: 0.32 ML
ECHO LV INTERNAL DIMENSION DIASTOLIC: 5.05 CM (ref 3.9–5.3)
ECHO LV INTERNAL DIMENSION SYSTOLIC: 3.54 CM
ECHO LV IVSD: 1.13 CM (ref 0.6–0.9)
ECHO LV MASS 2D: 242.2 G (ref 67–162)
ECHO LV MASS INDEX 2D: 106.9 G/M2 (ref 43–95)
ECHO LV POSTERIOR WALL DIASTOLIC: 1.03 CM (ref 0.6–0.9)
ECHO LVOT DIAM: 2.02 CM
ECHO LVOT PEAK GRADIENT: 1 MMHG
ECHO LVOT PEAK VELOCITY: 50.51 CM/S
ECHO LVOT SV: 56.1 ML
ECHO LVOT VTI: 17.51 CM
ECHO MV A VELOCITY: 92.13 CM/S
ECHO MV AREA PHT: 4.4 CM2
ECHO MV E DECELERATION TIME (DT): 174.4 MS
ECHO MV E VELOCITY: 78.68 CM/S
ECHO MV E/A RATIO: 0.85
ECHO MV E/E' LATERAL: 9.84
ECHO MV E/E' RATIO (AVERAGED): 14.75
ECHO MV E/E' SEPTAL: 19.67
ECHO MV PRESSURE HALF TIME (PHT): 50.6 MS
ECHO RA AREA 4C: 14.13 CM2
ECHO RV INTERNAL DIMENSION: 4.01 CM
ECHO TV REGURGITANT MAX VELOCITY: 276.86 CM/S
ECHO TV REGURGITANT PEAK GRADIENT: 30.7 MMHG
ERYTHROCYTE [DISTWIDTH] IN BLOOD BY AUTOMATED COUNT: 15.5 % (ref 11.6–14.5)
EST. AVERAGE GLUCOSE BLD GHB EST-MCNC: 154 MG/DL
EST. AVERAGE GLUCOSE BLD GHB EST-MCNC: 157 MG/DL
GLOBULIN SER CALC-MCNC: 3.4 G/DL (ref 2–4)
GLUCOSE SERPL-MCNC: 118 MG/DL (ref 74–99)
HBA1C MFR BLD: 7 % (ref 4.2–5.6)
HBA1C MFR BLD: 7.1 % (ref 4.2–5.6)
HCT VFR BLD AUTO: 41.7 % (ref 35–45)
HDLC SERPL-MCNC: 41 MG/DL (ref 40–60)
HDLC SERPL: 4.2 {RATIO} (ref 0–5)
HGB BLD-MCNC: 12.6 G/DL (ref 12–16)
LDLC SERPL CALC-MCNC: 106.6 MG/DL (ref 0–100)
LIPID PROFILE,FLP: ABNORMAL
LVFS 2D: 29.94 %
LVOT MG: 2.69 MMHG
LVOT MV: 0.78 CM/S
LVSV (MOD BI): 24.44 ML
LVSV (MOD SINGLE 4C): 25.07 ML
LVSV (MOD SINGLE): 26.54 ML
LVSV (TEICH): 30.73 ML
MCH RBC QN AUTO: 23.1 PG (ref 24–34)
MCHC RBC AUTO-ENTMCNC: 30.2 G/DL (ref 31–37)
MCV RBC AUTO: 76.5 FL (ref 74–97)
MV DEC SLOPE: 4.51
P-R INTERVAL, ECG05: 144 MS
PLATELET # BLD AUTO: 370 K/UL (ref 135–420)
PMV BLD AUTO: 10.5 FL (ref 9.2–11.8)
POTASSIUM SERPL-SCNC: 3.4 MMOL/L (ref 3.5–5.5)
PROT SERPL-MCNC: 6.4 G/DL (ref 6.4–8.2)
Q-T INTERVAL, ECG07: 336 MS
QRS DURATION, ECG06: 136 MS
QTC CALCULATION (BEZET), ECG08: 481 MS
RBC # BLD AUTO: 5.45 M/UL (ref 4.2–5.3)
SODIUM SERPL-SCNC: 141 MMOL/L (ref 136–145)
TRIGL SERPL-MCNC: 132 MG/DL (ref ?–150)
TROPONIN I SERPL-MCNC: 0.06 NG/ML (ref 0–0.04)
TROPONIN I SERPL-MCNC: 0.11 NG/ML (ref 0–0.04)
TROPONIN I SERPL-MCNC: 0.16 NG/ML (ref 0–0.04)
TSH SERPL DL<=0.05 MIU/L-ACNC: 1.98 UIU/ML (ref 0.36–3.74)
VENTRICULAR RATE, ECG03: 123 BPM
VLDLC SERPL CALC-MCNC: 26.4 MG/DL
WBC # BLD AUTO: 9.5 K/UL (ref 4.6–13.2)

## 2020-04-28 PROCEDURE — 80061 LIPID PANEL: CPT

## 2020-04-28 PROCEDURE — 80053 COMPREHEN METABOLIC PANEL: CPT

## 2020-04-28 PROCEDURE — 74011250637 HC RX REV CODE- 250/637: Performed by: FAMILY MEDICINE

## 2020-04-28 PROCEDURE — 74011250636 HC RX REV CODE- 250/636: Performed by: FAMILY MEDICINE

## 2020-04-28 PROCEDURE — 83036 HEMOGLOBIN GLYCOSYLATED A1C: CPT

## 2020-04-28 PROCEDURE — 36415 COLL VENOUS BLD VENIPUNCTURE: CPT

## 2020-04-28 PROCEDURE — 85027 COMPLETE CBC AUTOMATED: CPT

## 2020-04-28 PROCEDURE — 84443 ASSAY THYROID STIM HORMONE: CPT

## 2020-04-28 PROCEDURE — 93306 TTE W/DOPPLER COMPLETE: CPT

## 2020-04-28 PROCEDURE — 82550 ASSAY OF CK (CPK): CPT

## 2020-04-28 PROCEDURE — 65660000000 HC RM CCU STEPDOWN

## 2020-04-28 RX ORDER — ASPIRIN 81 MG/1
81 TABLET ORAL DAILY
Status: DISCONTINUED | OUTPATIENT
Start: 2020-04-29 | End: 2020-05-08 | Stop reason: HOSPADM

## 2020-04-28 RX ORDER — POTASSIUM CHLORIDE 7.45 MG/ML
10 INJECTION INTRAVENOUS
Status: COMPLETED | OUTPATIENT
Start: 2020-04-28 | End: 2020-04-28

## 2020-04-28 RX ORDER — LEVOFLOXACIN 5 MG/ML
500 INJECTION, SOLUTION INTRAVENOUS EVERY 24 HOURS
Status: COMPLETED | OUTPATIENT
Start: 2020-04-28 | End: 2020-05-02

## 2020-04-28 RX ADMIN — POTASSIUM CHLORIDE 10 MEQ: 7.46 INJECTION, SOLUTION INTRAVENOUS at 04:34

## 2020-04-28 RX ADMIN — Medication 10 ML: at 13:30

## 2020-04-28 RX ADMIN — LEVOFLOXACIN 500 MG: 5 INJECTION, SOLUTION INTRAVENOUS at 08:19

## 2020-04-28 RX ADMIN — Medication 10 ML: at 02:57

## 2020-04-28 RX ADMIN — POTASSIUM CHLORIDE 10 MEQ: 7.46 INJECTION, SOLUTION INTRAVENOUS at 02:56

## 2020-04-28 RX ADMIN — AMLODIPINE BESYLATE 10 MG: 5 TABLET ORAL at 08:19

## 2020-04-28 RX ADMIN — HEPARIN SODIUM 5000 UNITS: 5000 INJECTION INTRAVENOUS; SUBCUTANEOUS at 15:45

## 2020-04-28 RX ADMIN — POTASSIUM CHLORIDE 10 MEQ: 7.46 INJECTION, SOLUTION INTRAVENOUS at 04:35

## 2020-04-28 RX ADMIN — Medication 10 ML: at 06:51

## 2020-04-28 RX ADMIN — LOSARTAN POTASSIUM: 50 TABLET, FILM COATED ORAL at 08:19

## 2020-04-28 RX ADMIN — LABETALOL 20 MG/4 ML (5 MG/ML) INTRAVENOUS SYRINGE 20 MG: at 23:37

## 2020-04-28 RX ADMIN — HEPARIN SODIUM 5000 UNITS: 5000 INJECTION INTRAVENOUS; SUBCUTANEOUS at 23:13

## 2020-04-28 RX ADMIN — POTASSIUM CHLORIDE 10 MEQ: 7.46 INJECTION, SOLUTION INTRAVENOUS at 03:00

## 2020-04-28 RX ADMIN — HEPARIN SODIUM 5000 UNITS: 5000 INJECTION INTRAVENOUS; SUBCUTANEOUS at 06:50

## 2020-04-28 RX ADMIN — Medication 10 ML: at 22:00

## 2020-04-28 NOTE — ED NOTES
Dr. Phan Redd called to discuss pt  Cont although sl improved SOB and HR 1-teens-120's increasing occ to 140's. Pt able to speak in short sentences.

## 2020-04-28 NOTE — PROGRESS NOTES
Reason for Admission:   Savannah Sylvester is a 81 y.o. female with PMHX hypertension presents to the emergency department C/O worsening shortness of breath x1 week. Patient reports that shortness of breath is worse with exertion and worse when she lays flat. Pt denies chest pain, abdominal pain, fever, nausea, vomiting, and any other sxs or complaints. Denies any lower extremity edema. Denies any history of CHF.     PCP: Abraham Henriquez MD                    RUR Score:      7 low               Plan for utilizing home health:          PCP: First and Last name:     Name of Practice:    Are you a current patient: Yes/No:    Approximate date of last visit:                     Current Advanced Directive/Advance Care Plan:            Please consider place a consult. to palliative care or pastoral care to address acp           Transition of Care Plan:    Did not enter room due to covid restrictions  Telephone call with patient. Introduced cm and job responsibilities  Patient states her d/c plan is to return home. Patient reports she lives with her sister. She has a son who lives nearby. patient reports she does still drive. She has medicare for insurance  She has Dr. Payal Diaz for pcp. She does not have or need any DME per patient. She does have a cane but states she does not really use  Patient is currently wearing oxygen. States she does not wear oxygen at home  RN please attempt to wean from oxyen or place a qualifying walk test on chart and notify cm   Cm will continue to follow  Care Management Interventions  PCP Verified by CM: Yes  Transition of Care Consult (CM Consult): Discharge Planning  Current Support Network: Relative's Home  Confirm Follow Up Transport: Family  The Plan for Transition of Care is Related to the Following Treatment Goals : anticiapte dc home when medically cleared.  lives with sister  The Patient and/or Patient Representative was Provided with a Choice of Provider and Agrees with the Discharge Plan?: Yes  Freedom of Choice List was Provided with Basic Dialogue that Supports the Patient's Individualized Plan of Care/Goals, Treatment Preferences and Shares the Quality Data Associated with the Providers?: Yes

## 2020-04-28 NOTE — PROGRESS NOTES
Problem: Falls - Risk of  Goal: *Absence of Falls  Description: Document Kay Redmond Fall Risk and appropriate interventions in the flowsheet.   Outcome: Progressing Towards Goal  Note: Fall Risk Interventions:  Mobility Interventions: Assess mobility with egress test, OT consult for ADLs, Patient to call before getting OOB, PT Consult for mobility concerns, PT Consult for assist device competence, Communicate number of staff needed for ambulation/transfer, Utilize walker, cane, or other assistive device         Medication Interventions: Teach patient to arise slowly, Patient to call before getting OOB    Elimination Interventions: Call light in reach, Patient to call for help with toileting needs, Toileting schedule/hourly rounds              Problem: Patient Education: Go to Patient Education Activity  Goal: Patient/Family Education  Outcome: Progressing Towards Goal     Problem: Hypertension  Goal: *Blood pressure within specified parameters  Outcome: Progressing Towards Goal  Goal: *Fluid volume balance  Outcome: Progressing Towards Goal  Goal: *Labs within defined limits  Outcome: Progressing Towards Goal     Problem: Patient Education: Go to Patient Education Activity  Goal: Patient/Family Education  Outcome: Progressing Towards Goal     Problem: General Medical Care Plan  Goal: *Vital signs within specified parameters  Outcome: Progressing Towards Goal  Goal: *Labs within defined limits  Outcome: Progressing Towards Goal  Goal: *Absence of infection signs and symptoms  Outcome: Progressing Towards Goal  Goal: *Optimal pain control at patient's stated goal  Outcome: Progressing Towards Goal  Goal: *Skin integrity maintained  Outcome: Progressing Towards Goal  Goal: *Fluid volume balance  Outcome: Progressing Towards Goal  Goal: *Optimize nutritional status  Outcome: Progressing Towards Goal  Goal: *Anxiety reduced or absent  Outcome: Progressing Towards Goal  Goal: *Progressive mobility and function (eg: ADL's)  Outcome: Progressing Towards Goal

## 2020-04-28 NOTE — PROGRESS NOTES
Hospitalist Progress Note    Patient: Ana Paula Lay MRN: [id number]  CSN: [id number]    YOB: 1950  Age: 81 y.o. Sex: female    DOA: 4/27/2020 LOS:  LOS: 1 day          Chief Complaint:    SOB    Assessment/Plan   80 y/o female with h/o hypertension, degenerative disc disease, obesity presents to the ED with SOB and MO admitted for hypertensive emergency, elevated troponin, lung nodule and abnormal chest xray suggestive of PNA.      Hypertensive emergency -resolved     Uncontrolled HTN   Home meds restarted   Labetalol prn      Elevated troponin   Likely cardiac strain due to severely elevated blood pressure  Cardiology consult  -Dr. Curtis Schultz      PNA -presumptive   Levaquin because rocephin allergic reaction this admission      Hypokalemia -3.4  Replete and recheck      Lung nodule   13 x 12 mm nodular density in the right middle lobe   Will recheck after treating for CAP     Rocephin allergy -flushing and discomfort after start of admin of rocephin      DVT Prophylaxis -Heparin     Disposition :  Patient Active Problem List   Diagnosis Code    Elevated troponin R79.89    Hypertensive emergency I16.1    Hypokalemia E87.6    Lung nodule R91.1    CAP (community acquired pneumonia) J18.9       Subjective:    Says that she is feeling much better. Sitting up on side of the bed eating her breakfast     Review of systems:    Constitutional: denies fevers, chills, myalgias  Respiratory: denies SOB, cough  Cardiovascular: denies chest pain, palpitations  Gastrointestinal: denies nausea, vomiting, diarrhea      Vital signs/Intake and Output:  Visit Vitals  /83 (BP 1 Location: Right arm, BP Patient Position: At rest;Supine)   Pulse 99   Temp 98.2 °F (36.8 °C)   Resp 24   Ht 5' 5\" (1.651 m)   Wt 125.1 kg (275 lb 14.4 oz)   SpO2 95%   BMI 45.91 kg/m²     Current Shift:  No intake/output data recorded.   Last three shifts:  04/26 1901 - 04/28 0700  In: -   Out: 250 [Urine:250]    Exam:    General: Well developed, alert, NAD, OX3  Head/Neck: NCAT, supple, No masses, No lymphadenopathy  CVS:Regular rate and rhythm, no M/R/G, S1/S2 heard, no thrill  Lungs:Clear to auscultation bilaterally, no wheezes, rhonchi, or rales  Abdomen: Soft, Nontender, No distention, Normal Bowel sounds, No hepatomegaly  Extremities: No C/C/E, pulses palpable 2+  Skin:normal texture and turgor, no rashes, no lesions  Neuro:grossly normal , follows commands  Psych:appropriate                Labs: Results:       Chemistry Recent Labs     04/28/20 0449 04/27/20  1630   * 152*    142   K 3.4* 3.1*    103   CO2 34* 33*   BUN 17 15   CREA 0.96 0.98   CA 8.2* 8.6   AGAP 6 6   BUCR 18 15   AP 57 68   TP 6.4 7.1   ALB 3.0* 3.2*   GLOB 3.4 3.9   AGRAT 0.9 0.8      CBC w/Diff Recent Labs     04/28/20 0449 04/27/20  1630   WBC 9.5 7.1   RBC 5.45* 5.77*   HGB 12.6 13.4   HCT 41.7 44.2    359   GRANS  --  67   LYMPH  --  28   EOS  --  0      Cardiac Enzymes Recent Labs     04/28/20 0449 04/27/20  2330    120   CKND1 2.5 2.3      Coagulation Recent Labs     04/27/20  1630   PTP 12.9   INR 1.0       Lipid Panel No results found for: CHOL, CHOLPOCT, CHOLX, CHLST, CHOLV, 077862, HDL, HDLP, LDL, LDLC, DLDLP, 313229, VLDLC, VLDL, TGLX, TRIGL, TRIGP, TGLPOCT, CHHD, CHHDX   BNP No results for input(s): BNPP in the last 72 hours.    Liver Enzymes Recent Labs     04/28/20  0449   TP 6.4   ALB 3.0*   AP 57   SGOT 17      Thyroid Studies Lab Results   Component Value Date/Time    TSH 1.98 04/28/2020 04:49 AM        Procedures/imaging: see electronic medical records for all procedures/Xrays and details which were not copied into this note but were reviewed prior to creation of Rimma Thibodeaux MD

## 2020-04-28 NOTE — DIABETES MGMT
GLYCEMIC CONTROL PROGRESS NOTE:      - chart reviewed, no known h/o DM, current HbA1C meets criteria for DM  - recommend recheck A1C    Addendum:  - above orders entered with permission from Dr. Marialuisa Khalil    Recent Glucose Results:   Lab Results   Component Value Date/Time     (H) 04/28/2020 04:49 AM     (H) 04/27/2020 04:30 PM     Lab Results   Component Value Date/Time    Hemoglobin A1c 7.0 (H) 04/28/2020 04:49 AM    Hemoglobin A1c 7.0 (H) 01/03/2014 10:32 PM     Kg Dean MS, RN, CDE  Glycemic Control Team  [phone]  Pager [phone] (M-TH 8:00-4:30P)  *After Hours pager [phone]

## 2020-04-28 NOTE — PROGRESS NOTES
1459 Pts MEWs is a 4 due to a heart rate of 114 and respirations of 24. Will reassess. PT denies any difficulty breathing, pain, palpitations, or distress. Will continue to monitor. Bedside and Verbal shift change report given to IQRA Bauman RN (oncoming nurse) by Darcy Gordillo. Darren Hua RN (offgoing nurse). Report included the following information SBAR, Kardex, MAR, Accordion and Recent Results.

## 2020-04-28 NOTE — PROGRESS NOTES
Problem: Falls - Risk of  Goal: *Absence of Falls  Description: Document Patrick Garcia Fall Risk and appropriate interventions in the flowsheet.   Outcome: Progressing Towards Goal  Note: Fall Risk Interventions:  Mobility Interventions: Patient to call before getting OOB, Strengthening exercises (ROM-active/passive), Utilize walker, cane, or other assistive device         Medication Interventions: Patient to call before getting OOB, Teach patient to arise slowly    Elimination Interventions: Call light in reach, Patient to call for help with toileting needs              Problem: Hypertension  Goal: *Blood pressure within specified parameters  Outcome: Progressing Towards Goal  Goal: *Fluid volume balance  Outcome: Progressing Towards Goal  Goal: *Labs within defined limits  Outcome: Progressing Towards Goal     Problem: General Medical Care Plan  Goal: *Vital signs within specified parameters  Outcome: Progressing Towards Goal  Goal: *Labs within defined limits  Outcome: Progressing Towards Goal  Goal: *Absence of infection signs and symptoms  Outcome: Progressing Towards Goal  Goal: *Optimal pain control at patient's stated goal  Outcome: Progressing Towards Goal  Goal: *Skin integrity maintained  Outcome: Progressing Towards Goal  Goal: *Fluid volume balance  Outcome: Progressing Towards Goal  Goal: *Optimize nutritional status  Outcome: Progressing Towards Goal  Goal: *Anxiety reduced or absent  Outcome: Progressing Towards Goal  Goal: *Progressive mobility and function (eg: ADL's)  Outcome: Progressing Towards Goal

## 2020-04-28 NOTE — PROGRESS NOTES
0700 Assumed patient care from off-going nurse Nick Vidal RN. Whiteboard updated, bed wheels locked, bed in lowest position, and call bell within reach. 0800 Assessment complete. Patient resting comfortably in bed. No complaint of pain or SOB at this time. Call bell within reach. 1200 Reassessment complete. Patient resting comfortably in bed. No complaint of pain or SOB at this time. Call bell within reach. 1600 Reassessment complete. Patient resting comfortably in bed. No complaint of pain or SOB at this time. Call bell within reach. 1900 Bedside and Verbal shift change report given to Kirk Schuster RN (oncoming nurse) by Alessandra Dooley (offgoing nurse). Report included the following information SBAR.

## 2020-04-28 NOTE — PROGRESS NOTES
Problem: Falls - Risk of  Goal: *Absence of Falls  Description: Document Linda Leyva Fall Risk and appropriate interventions in the flowsheet.   4/28/2020 0444 by Katarina Salmeron RN  Outcome: Progressing Towards Goal  Note: Fall Risk Interventions:  Mobility Interventions: Assess mobility with egress test, OT consult for ADLs, Patient to call before getting OOB, PT Consult for mobility concerns, PT Consult for assist device competence, Communicate number of staff needed for ambulation/transfer, Utilize walker, cane, or other assistive device         Medication Interventions: Teach patient to arise slowly, Patient to call before getting OOB    Elimination Interventions: Call light in reach, Patient to call for help with toileting needs, Toileting schedule/hourly rounds           4/28/2020 0217 by Katarina Salmeron RN  Outcome: Progressing Towards Goal  Note: Fall Risk Interventions:  Mobility Interventions: Assess mobility with egress test, OT consult for ADLs, Patient to call before getting OOB, PT Consult for mobility concerns, PT Consult for assist device competence, Communicate number of staff needed for ambulation/transfer, Utilize walker, cane, or other assistive device         Medication Interventions: Teach patient to arise slowly, Patient to call before getting OOB    Elimination Interventions: Call light in reach, Patient to call for help with toileting needs, Toileting schedule/hourly rounds              Problem: Patient Education: Go to Patient Education Activity  Goal: Patient/Family Education  4/28/2020 0444 by Katarina Salmeron RN  Outcome: Progressing Towards Goal  4/28/2020 0217 by Katarina Salmeron RN  Outcome: Progressing Towards Goal     Problem: Hypertension  Goal: *Blood pressure within specified parameters  4/28/2020 0444 by Katarina Salmeron RN  Outcome: Progressing Towards Goal  4/28/2020 0217 by Katarian Salmeron RN  Outcome: Progressing Towards Goal  Goal: *Fluid volume balance  4/28/2020 2454 by Lorilee Form, RN  Outcome: Progressing Towards Goal  4/28/2020 0217 by Lorilee Form, RN  Outcome: Progressing Towards Goal  Goal: *Labs within defined limits  4/28/2020 0444 by Lorilee Form, RN  Outcome: Progressing Towards Goal  4/28/2020 0217 by Lorilee Form, RN  Outcome: Progressing Towards Goal     Problem: Patient Education: Go to Patient Education Activity  Goal: Patient/Family Education  4/28/2020 0444 by Lorilee Form, RN  Outcome: Progressing Towards Goal  4/28/2020 0217 by Lorilee Form, RN  Outcome: Progressing Towards Goal     Problem: General Medical Care Plan  Goal: *Vital signs within specified parameters  4/28/2020 0444 by Lorilee Form, RN  Outcome: Progressing Towards Goal  4/28/2020 0217 by Lorilee Form, RN  Outcome: Progressing Towards Goal  Goal: *Labs within defined limits  4/28/2020 0444 by Lorilee Form, RN  Outcome: Progressing Towards Goal  4/28/2020 0217 by Lorilee Form, RN  Outcome: Progressing Towards Goal  Goal: *Absence of infection signs and symptoms  4/28/2020 0444 by Lorilee Form, RN  Outcome: Progressing Towards Goal  4/28/2020 0217 by Lorilee Form, RN  Outcome: Progressing Towards Goal  Goal: *Optimal pain control at patient's stated goal  4/28/2020 0444 by Lorilee Form, RN  Outcome: Progressing Towards Goal  4/28/2020 0217 by Lorilee Form, RN  Outcome: Progressing Towards Goal  Goal: *Skin integrity maintained  4/28/2020 0444 by Lorilee Form, RN  Outcome: Progressing Towards Goal  4/28/2020 0217 by Lorilee Form, RN  Outcome: Progressing Towards Goal  Goal: *Fluid volume balance  4/28/2020 0444 by Lorilee Form, RN  Outcome: Progressing Towards Goal  4/28/2020 0217 by Lorilee Form, RN  Outcome: Progressing Towards Goal  Goal: *Optimize nutritional status  4/28/2020 0444 by Lorilee Form, RN  Outcome: Progressing Towards Goal  4/28/2020 0217 by Lorilee Form, RN  Outcome: Progressing Towards Goal  Goal: *Anxiety reduced or absent  4/28/2020 0444 by Logan Awad RN  Outcome: Progressing Towards Goal  4/28/2020 0217 by Logan Awad RN  Outcome: Progressing Towards Goal  Goal: *Progressive mobility and function (eg: ADL's)  4/28/2020 0444 by Logan Awad RN  Outcome: Progressing Towards Goal  4/28/2020 0217 by Logan Awad RN  Outcome: Progressing Towards Goal

## 2020-04-28 NOTE — ROUTINE PROCESS
2143 TRANSFER - IN REPORT: 
 
Verbal report received from PAULINA Rodarte RN(name) on Oral Speaker D Minor  being received from ED(unit) for routine progression of care Report consisted of patients Situation, Background, Assessment and  
Recommendations(SBAR). Information from the following report(s) SBAR, Kardex, ED Summary, MAR, Accordion and Recent Results was reviewed with the receiving nurse. Opportunity for questions and clarification was provided. Assessment completed upon patients arrival to unit and care assumed.

## 2020-04-28 NOTE — CDMP QUERY
Pt admitted with hypertension emergemcy. Pt noted to have SOB Adriel Rich If possible, please document in the progress notes and d/c summary if you are evaluating and / or treating any of the following: ? Acute respiratory failure 
o With hypoxia 
o With hypercapnia ? Chronic respiratory failure 
o With hypoxia 
o With hypercapnia ? Acute on chronic respiratory failure 
o With hypoxia 
o With hypercapnia 
? Other, please specify ? Clinically unable to determine The medical record reflects the following: 
   Risk Factors: SOB Clinical Indicators: SOB, hypoxiABG'sc, dyspenic, MO, PER nurse difficulty speaking D/T SOB. PCO2 56, Po2 63, Respirations 34 Treatment:02 3L NC, lasix IV, ABG's Thank- You Trevon Desai RN [facility] Cell [phone]

## 2020-04-29 ENCOUNTER — APPOINTMENT (OUTPATIENT)
Dept: CT IMAGING | Age: 70
DRG: 304 | End: 2020-04-29
Attending: FAMILY MEDICINE
Payer: MEDICARE

## 2020-04-29 ENCOUNTER — APPOINTMENT (OUTPATIENT)
Dept: NON INVASIVE DIAGNOSTICS | Age: 70
DRG: 304 | End: 2020-04-29
Attending: INTERNAL MEDICINE
Payer: MEDICARE

## 2020-04-29 ENCOUNTER — APPOINTMENT (OUTPATIENT)
Dept: NUCLEAR MEDICINE | Age: 70
DRG: 304 | End: 2020-04-29
Attending: INTERNAL MEDICINE
Payer: MEDICARE

## 2020-04-29 ENCOUNTER — APPOINTMENT (OUTPATIENT)
Dept: CT IMAGING | Age: 70
DRG: 304 | End: 2020-04-29
Attending: INTERNAL MEDICINE
Payer: MEDICARE

## 2020-04-29 ENCOUNTER — APPOINTMENT (OUTPATIENT)
Dept: GENERAL RADIOLOGY | Age: 70
DRG: 304 | End: 2020-04-29
Attending: FAMILY MEDICINE
Payer: MEDICARE

## 2020-04-29 LAB
ALBUMIN SERPL-MCNC: 3 G/DL (ref 3.4–5)
ALBUMIN/GLOB SERPL: 0.9 {RATIO} (ref 0.8–1.7)
ALP SERPL-CCNC: 57 U/L (ref 45–117)
ALT SERPL-CCNC: 22 U/L (ref 13–56)
AMMONIA PLAS-SCNC: 22 UMOL/L (ref 11–32)
ANION GAP SERPL CALC-SCNC: 5 MMOL/L (ref 3–18)
ANION GAP SERPL CALC-SCNC: 6 MMOL/L (ref 3–18)
ARTERIAL PATENCY WRIST A: YES
ARTERIAL PATENCY WRIST A: YES
AST SERPL-CCNC: 16 U/L (ref 10–38)
ATRIAL RATE: 102 BPM
BASE EXCESS BLD CALC-SCNC: 10 MMOL/L
BASE EXCESS BLD CALC-SCNC: 9 MMOL/L
BASOPHILS # BLD: 0 K/UL (ref 0–0.1)
BASOPHILS # BLD: 0 K/UL (ref 0–0.1)
BASOPHILS NFR BLD: 0 % (ref 0–2)
BASOPHILS NFR BLD: 0 % (ref 0–2)
BDY SITE: ABNORMAL
BDY SITE: ABNORMAL
BILIRUB SERPL-MCNC: 0.5 MG/DL (ref 0.2–1)
BODY TEMPERATURE: 98.6
BUN SERPL-MCNC: 25 MG/DL (ref 7–18)
BUN SERPL-MCNC: 26 MG/DL (ref 7–18)
BUN/CREAT SERPL: 17 (ref 12–20)
BUN/CREAT SERPL: 21 (ref 12–20)
CALCIUM SERPL-MCNC: 8.1 MG/DL (ref 8.5–10.1)
CALCIUM SERPL-MCNC: 8.2 MG/DL (ref 8.5–10.1)
CALCULATED P AXIS, ECG09: -25 DEGREES
CALCULATED R AXIS, ECG10: -70 DEGREES
CALCULATED T AXIS, ECG11: 67 DEGREES
CHLORIDE SERPL-SCNC: 102 MMOL/L (ref 100–111)
CHLORIDE SERPL-SCNC: 102 MMOL/L (ref 100–111)
CK MB CFR SERPL CALC: 1.3 % (ref 0–4)
CK MB CFR SERPL CALC: 1.6 % (ref 0–4)
CK MB SERPL-MCNC: 2.7 NG/ML (ref 5–25)
CK MB SERPL-MCNC: 3 NG/ML (ref 5–25)
CK SERPL-CCNC: 186 U/L (ref 26–192)
CK SERPL-CCNC: 212 U/L (ref 26–192)
CO2 SERPL-SCNC: 33 MMOL/L (ref 21–32)
CO2 SERPL-SCNC: 33 MMOL/L (ref 21–32)
CREAT SERPL-MCNC: 1.23 MG/DL (ref 0.6–1.3)
CREAT SERPL-MCNC: 1.44 MG/DL (ref 0.6–1.3)
DIAGNOSIS, 93000: NORMAL
DIFFERENTIAL METHOD BLD: ABNORMAL
DIFFERENTIAL METHOD BLD: ABNORMAL
EOSINOPHIL # BLD: 0 K/UL (ref 0–0.4)
EOSINOPHIL # BLD: 0 K/UL (ref 0–0.4)
EOSINOPHIL NFR BLD: 0 % (ref 0–5)
EOSINOPHIL NFR BLD: 0 % (ref 0–5)
ERYTHROCYTE [DISTWIDTH] IN BLOOD BY AUTOMATED COUNT: 15.5 % (ref 11.6–14.5)
ERYTHROCYTE [DISTWIDTH] IN BLOOD BY AUTOMATED COUNT: 15.6 % (ref 11.6–14.5)
GAS FLOW.O2 O2 DELIVERY SYS: ABNORMAL L/MIN
GAS FLOW.O2 O2 DELIVERY SYS: ABNORMAL L/MIN
GAS FLOW.O2 SETTING OXYMISER: 2 L/M
GAS FLOW.O2 SETTING OXYMISER: 30 L/M
GLOBULIN SER CALC-MCNC: 3.4 G/DL (ref 2–4)
GLUCOSE BLD STRIP.AUTO-MCNC: 113 MG/DL (ref 70–110)
GLUCOSE BLD STRIP.AUTO-MCNC: 137 MG/DL (ref 70–110)
GLUCOSE SERPL-MCNC: 131 MG/DL (ref 74–99)
GLUCOSE SERPL-MCNC: 140 MG/DL (ref 74–99)
HCO3 BLD-SCNC: 35.1 MMOL/L (ref 22–26)
HCO3 BLD-SCNC: 35.2 MMOL/L (ref 22–26)
HCT VFR BLD AUTO: 40 % (ref 35–45)
HCT VFR BLD AUTO: 40.9 % (ref 35–45)
HGB BLD-MCNC: 11.8 G/DL (ref 12–16)
HGB BLD-MCNC: 12.2 G/DL (ref 12–16)
LYMPHOCYTES # BLD: 1.5 K/UL (ref 0.9–3.6)
LYMPHOCYTES # BLD: 1.5 K/UL (ref 0.9–3.6)
LYMPHOCYTES NFR BLD: 20 % (ref 21–52)
LYMPHOCYTES NFR BLD: 22 % (ref 21–52)
MAGNESIUM SERPL-MCNC: 2 MG/DL (ref 1.6–2.6)
MAGNESIUM SERPL-MCNC: 2.2 MG/DL (ref 1.6–2.6)
MCH RBC QN AUTO: 23.1 PG (ref 24–34)
MCH RBC QN AUTO: 23.2 PG (ref 24–34)
MCHC RBC AUTO-ENTMCNC: 29.5 G/DL (ref 31–37)
MCHC RBC AUTO-ENTMCNC: 29.8 G/DL (ref 31–37)
MCV RBC AUTO: 77.9 FL (ref 74–97)
MCV RBC AUTO: 78.4 FL (ref 74–97)
MONOCYTES # BLD: 0.4 K/UL (ref 0.05–1.2)
MONOCYTES # BLD: 0.5 K/UL (ref 0.05–1.2)
MONOCYTES NFR BLD: 6 % (ref 3–10)
MONOCYTES NFR BLD: 6 % (ref 3–10)
NEUTS SEG # BLD: 5 K/UL (ref 1.8–8)
NEUTS SEG # BLD: 5.4 K/UL (ref 1.8–8)
NEUTS SEG NFR BLD: 72 % (ref 40–73)
NEUTS SEG NFR BLD: 74 % (ref 40–73)
O2/TOTAL GAS SETTING VFR VENT: 24 %
O2/TOTAL GAS SETTING VFR VENT: 28 %
P-R INTERVAL, ECG05: 128 MS
PCO2 BLD: 61 MMHG (ref 35–45)
PCO2 BLD: 71 MMHG (ref 35–45)
PH BLD: 7.3 [PH] (ref 7.35–7.45)
PH BLD: 7.37 [PH] (ref 7.35–7.45)
PLATELET # BLD AUTO: 323 K/UL (ref 135–420)
PLATELET # BLD AUTO: 346 K/UL (ref 135–420)
PMV BLD AUTO: 10.3 FL (ref 9.2–11.8)
PMV BLD AUTO: 10.6 FL (ref 9.2–11.8)
PO2 BLD: 117 MMHG (ref 80–100)
PO2 BLD: 60 MMHG (ref 80–100)
POTASSIUM SERPL-SCNC: 3.2 MMOL/L (ref 3.5–5.5)
POTASSIUM SERPL-SCNC: 3.6 MMOL/L (ref 3.5–5.5)
PROT SERPL-MCNC: 6.4 G/DL (ref 6.4–8.2)
Q-T INTERVAL, ECG07: 410 MS
QRS DURATION, ECG06: 140 MS
QTC CALCULATION (BEZET), ECG08: 534 MS
RBC # BLD AUTO: 5.1 M/UL (ref 4.2–5.3)
RBC # BLD AUTO: 5.25 M/UL (ref 4.2–5.3)
SAO2 % BLD: 89 % (ref 92–97)
SAO2 % BLD: 98 % (ref 92–97)
SERVICE CMNT-IMP: ABNORMAL
SERVICE CMNT-IMP: ABNORMAL
SODIUM SERPL-SCNC: 140 MMOL/L (ref 136–145)
SODIUM SERPL-SCNC: 141 MMOL/L (ref 136–145)
SPECIMEN TYPE: ABNORMAL
SPECIMEN TYPE: ABNORMAL
TOTAL RESP. RATE, ITRR: 20
TOTAL RESP. RATE, ITRR: 21
TROPONIN I SERPL-MCNC: 0.08 NG/ML (ref 0–0.04)
TROPONIN I SERPL-MCNC: 0.12 NG/ML (ref 0–0.04)
VENTRICULAR RATE, ECG03: 102 BPM
WBC # BLD AUTO: 7 K/UL (ref 4.6–13.2)
WBC # BLD AUTO: 7.3 K/UL (ref 4.6–13.2)

## 2020-04-29 PROCEDURE — 36600 WITHDRAWAL OF ARTERIAL BLOOD: CPT

## 2020-04-29 PROCEDURE — 77010033678 HC OXYGEN DAILY

## 2020-04-29 PROCEDURE — 36415 COLL VENOUS BLD VENIPUNCTURE: CPT

## 2020-04-29 PROCEDURE — 74011250636 HC RX REV CODE- 250/636

## 2020-04-29 PROCEDURE — 65610000006 HC RM INTENSIVE CARE

## 2020-04-29 PROCEDURE — 80053 COMPREHEN METABOLIC PANEL: CPT

## 2020-04-29 PROCEDURE — 83735 ASSAY OF MAGNESIUM: CPT

## 2020-04-29 PROCEDURE — 87086 URINE CULTURE/COLONY COUNT: CPT

## 2020-04-29 PROCEDURE — 82803 BLOOD GASES ANY COMBINATION: CPT

## 2020-04-29 PROCEDURE — 71045 X-RAY EXAM CHEST 1 VIEW: CPT

## 2020-04-29 PROCEDURE — 74011636320 HC RX REV CODE- 636/320: Performed by: FAMILY MEDICINE

## 2020-04-29 PROCEDURE — 74011000250 HC RX REV CODE- 250: Performed by: INTERNAL MEDICINE

## 2020-04-29 PROCEDURE — 71275 CT ANGIOGRAPHY CHEST: CPT

## 2020-04-29 PROCEDURE — 70450 CT HEAD/BRAIN W/O DYE: CPT

## 2020-04-29 PROCEDURE — 74011250636 HC RX REV CODE- 250/636: Performed by: FAMILY MEDICINE

## 2020-04-29 PROCEDURE — 94640 AIRWAY INHALATION TREATMENT: CPT

## 2020-04-29 PROCEDURE — 93005 ELECTROCARDIOGRAM TRACING: CPT

## 2020-04-29 PROCEDURE — 82962 GLUCOSE BLOOD TEST: CPT

## 2020-04-29 PROCEDURE — 82140 ASSAY OF AMMONIA: CPT

## 2020-04-29 PROCEDURE — 85025 COMPLETE CBC W/AUTO DIFF WBC: CPT

## 2020-04-29 PROCEDURE — 77030040361 HC SLV COMPR DVT MDII -B

## 2020-04-29 PROCEDURE — 82550 ASSAY OF CK (CPK): CPT

## 2020-04-29 RX ORDER — LABETALOL HYDROCHLORIDE 5 MG/ML
10 INJECTION, SOLUTION INTRAVENOUS ONCE
Status: DISCONTINUED | OUTPATIENT
Start: 2020-04-29 | End: 2020-04-29

## 2020-04-29 RX ORDER — DEXTROSE MONOHYDRATE 100 MG/ML
125-250 INJECTION, SOLUTION INTRAVENOUS AS NEEDED
Status: DISCONTINUED | OUTPATIENT
Start: 2020-04-29 | End: 2020-05-08 | Stop reason: HOSPADM

## 2020-04-29 RX ORDER — MAGNESIUM SULFATE 100 %
4 CRYSTALS MISCELLANEOUS AS NEEDED
Status: DISCONTINUED | OUTPATIENT
Start: 2020-04-29 | End: 2020-05-08 | Stop reason: HOSPADM

## 2020-04-29 RX ORDER — LABETALOL HCL 20 MG/4 ML
10 SYRINGE (ML) INTRAVENOUS ONCE
Status: COMPLETED | OUTPATIENT
Start: 2020-04-29 | End: 2020-04-29

## 2020-04-29 RX ORDER — IPRATROPIUM BROMIDE AND ALBUTEROL SULFATE 2.5; .5 MG/3ML; MG/3ML
3 SOLUTION RESPIRATORY (INHALATION)
Status: DISCONTINUED | OUTPATIENT
Start: 2020-04-29 | End: 2020-05-08 | Stop reason: HOSPADM

## 2020-04-29 RX ORDER — FUROSEMIDE 10 MG/ML
20 INJECTION INTRAMUSCULAR; INTRAVENOUS ONCE
Status: COMPLETED | OUTPATIENT
Start: 2020-04-29 | End: 2020-04-29

## 2020-04-29 RX ORDER — FUROSEMIDE 10 MG/ML
INJECTION INTRAMUSCULAR; INTRAVENOUS
Status: COMPLETED
Start: 2020-04-29 | End: 2020-04-29

## 2020-04-29 RX ORDER — INSULIN LISPRO 100 [IU]/ML
INJECTION, SOLUTION INTRAVENOUS; SUBCUTANEOUS EVERY 6 HOURS
Status: DISCONTINUED | OUTPATIENT
Start: 2020-04-29 | End: 2020-05-08

## 2020-04-29 RX ADMIN — LABETALOL 20 MG/4 ML (5 MG/ML) INTRAVENOUS SYRINGE 20 MG: at 04:20

## 2020-04-29 RX ADMIN — IPRATROPIUM BROMIDE AND ALBUTEROL SULFATE 3 ML: .5; 3 SOLUTION RESPIRATORY (INHALATION) at 11:01

## 2020-04-29 RX ADMIN — Medication 10 ML: at 14:00

## 2020-04-29 RX ADMIN — LEVOFLOXACIN 500 MG: 5 INJECTION, SOLUTION INTRAVENOUS at 09:57

## 2020-04-29 RX ADMIN — HEPARIN SODIUM 5000 UNITS: 5000 INJECTION INTRAVENOUS; SUBCUTANEOUS at 22:31

## 2020-04-29 RX ADMIN — Medication 10 ML: at 22:32

## 2020-04-29 RX ADMIN — FUROSEMIDE 20 MG: 10 INJECTION, SOLUTION INTRAMUSCULAR; INTRAVENOUS at 09:00

## 2020-04-29 RX ADMIN — HEPARIN SODIUM 5000 UNITS: 5000 INJECTION INTRAVENOUS; SUBCUTANEOUS at 15:23

## 2020-04-29 RX ADMIN — LABETALOL 20 MG/4 ML (5 MG/ML) INTRAVENOUS SYRINGE 10 MG: at 22:27

## 2020-04-29 RX ADMIN — HEPARIN SODIUM 5000 UNITS: 5000 INJECTION INTRAVENOUS; SUBCUTANEOUS at 06:20

## 2020-04-29 RX ADMIN — FUROSEMIDE 20 MG: 10 INJECTION, SOLUTION INTRAMUSCULAR; INTRAVENOUS at 08:24

## 2020-04-29 RX ADMIN — LABETALOL 20 MG/4 ML (5 MG/ML) INTRAVENOUS SYRINGE 20 MG: at 17:04

## 2020-04-29 RX ADMIN — IOPAMIDOL 100 ML: 755 INJECTION, SOLUTION INTRAVENOUS at 08:43

## 2020-04-29 NOTE — ROUTINE PROCESS
Bedside shift change report given to Rneate Guerrero Rn (oncoming nurse) by Perry Armando RN (offgoing nurse). Report included the following information SBAR, Kardex, Intake/Output, MAR, Recent Results and Cardiac Rhythm sinus tachycardia.

## 2020-04-29 NOTE — PROGRESS NOTES
STAT ABG'S OBTAINED. PH 7.302/PCO2 71/PO2 117/HCO3 35.1 ON 2L NC.  DECREASED TO 0.5L. PATIENT IS EXHIBITING DECREASED LOC AND RESP ARE LABORED.

## 2020-04-29 NOTE — PROGRESS NOTES
Patient is unable to answer any MRI Safety Screening questions at this time per nurse. Patient has AMS. Patient had CT of Head and CTA chest done with motion limited that study per Radiologist reading. Patient would need to be able to hold still for MRI also. Will check back later today with nurse.  una

## 2020-04-29 NOTE — DIABETES MGMT
GLYCEMIC CONTROL PROGRESS NOTE:      - chart reviewed, no known h/o DM, HbA1C meets criteria for DM2  - - Humalog Normal Insulin Sensitivity Corrective Coverage orders entered per protocol    Recent Glucose Results:   Lab Results   Component Value Date/Time     (H) 04/29/2020 09:30 AM     (H) 04/29/2020 03:20 AM    GLUCPOC 137 (H) 04/29/2020 08:19 AM     Lab Results   Component Value Date/Time    Hemoglobin A1c 7.1 (H) 04/28/2020 02:28 PM    Hemoglobin A1c 7.0 (H) 04/28/2020 04:49 AM    Hemoglobin A1c 7.0 (H) 01/03/2014 10:32 PM         Kd Guerra MS, RN, CDE  Glycemic Control Team  [phone]  Pager [phone] (M-TH 8:00-4:30P)  *After Hours pager [phone]

## 2020-04-29 NOTE — CONSULTS
[facility]      Patient: Shaina Ayers MRN: [id number]  SSN: [id number]    YOB: 1950  Age: 81 y.o. Sex: female    Date of Consultation: 04/28/2020  Referring Physician: González Ruffin   Reason for Consultation: Abnormal troponin    Chief complain: Shortness of breath    HPI: 62-year-old female came to emergency room with complaining of worsening of shortness of breath for past few days. She is complaining of shortness of breath on exertion for one week. For past couple of days she is complaining of shortness of breath on minimal exertion. She denies any orthopnea or PND. She denies any chest pain on exertion. She denies any dizziness, palpitation, presyncope or syncope. She denies any smoking or alcohol abuse. Her blood pressure was significantly elevated when she came to emergency room. Cardiology consult called for abnormal troponin. Past Medical History:   Diagnosis Date    Hypertension      Past Surgical History:   Procedure Laterality Date    HX HIP REPLACEMENT      left hip     Current Facility-Administered Medications   Medication Dose Route Frequency    levoFLOXacin (LEVAQUIN) 500 mg in D5W IVPB  500 mg IntraVENous Q24H    [START ON 4/29/2020] aspirin delayed-release tablet 81 mg  81 mg Oral DAILY    heparin (porcine) injection 5,000 Units  5,000 Units SubCUTAneous Q8H    sodium chloride (NS) flush 5-40 mL  5-40 mL IntraVENous Q8H    sodium chloride (NS) flush 5-40 mL  5-40 mL IntraVENous PRN    ondansetron (ZOFRAN) injection 4 mg  4 mg IntraVENous Q4H PRN    losartan/hydroCHLOROthiazide (HYZAAR) 100/25 mg   Oral DAILY    amLODIPine (NORVASC) tablet 10 mg  10 mg Oral DAILY    labetaloL (NORMODYNE;TRANDATE) 20 mg/4 mL (5 mg/mL) injection 20 mg  20 mg IntraVENous Q6H PRN       Allergies and Intolerances: Allergies   Allergen Reactions    Rocephin [Ceftriaxone] Other (comments)     Flushing       Family History:   History reviewed.  No pertinent family history. Social History:   She  reports that she has never smoked. She has never used smokeless tobacco.  She  reports no history of alcohol use. Review of Systems:     Gen: No fever, chills, malaise, weight loss/gain. Heent: No headache, rhinorrhea, epistaxis, ear pain, hearing loss, sinus pain, neck pain/stiffness, sore throat. Heart: Positive shortness of breath on exertion, No chest pain, palpitations, pnd, or orthopnea. Resp: No cough, hemoptysis, wheezing and dyspnea. GI: No nausea, vomiting, diarrhea, constipation, melena or hematochezia. : No urinary obstruction, dysuria or hematuria. Derm: No rash, new skin lesion or pruritis. Musc/skeletal: positive bone or joint complains. Vasc: No edema, cyanosis or claudication. Endo: No heat/cold intolerance, no polyuria,polydipsia or polyphagia. Neuro: No unilateral weakness, numbness, tingling. No seizures. Heme: No easy bruising or bleeding. Physical:   Patient Vitals for the past 6 hrs:   Temp Pulse Resp BP SpO2   04/28/20 1936 98.7 °F (37.1 °C) (!) 104 22 135/63 100 %         Exam:   General Appearance: Comfortable, not using accessory muscles of respiration. HEENT: ROSENDO. HEAD: Atraumatic  NECK: No JVD, no thyroidomeglay. CAROTIDS: No bruit  LUNGS: Clear bilaterally. HEART: S1+S2 audible, no murmur, no pericardial rub. ABD: Non-tender, BS Audible    EXT: No edema, and no cyanosis. VASCULAR EXAM: Pulses are intact. PSYCHIATRIC EXAM: Mood is appropriate. MUSCULOSKELETAL: Grossly no joint deformity.   NEUROLOGICAL: AAO times 3, Motor and sensory sytem intact    Review of Data:   LABS:   Lab Results   Component Value Date/Time    WBC 9.5 04/28/2020 04:49 AM    HGB 12.6 04/28/2020 04:49 AM    HCT 41.7 04/28/2020 04:49 AM    PLATELET 492 28/16/0897 04:49 AM     Lab Results   Component Value Date/Time    Sodium 141 04/28/2020 04:49 AM    Potassium 3.4 (L) 04/28/2020 04:49 AM    Chloride 101 04/28/2020 04:49 AM    CO2 34 (H) 04/28/2020 04:49 AM    Glucose 118 (H) 04/28/2020 04:49 AM    BUN 17 04/28/2020 04:49 AM    Creatinine 0.96 04/28/2020 04:49 AM     Lab Results   Component Value Date/Time    Cholesterol, total 174 04/28/2020 04:49 AM    HDL Cholesterol 41 04/28/2020 04:49 AM    LDL, calculated 106.6 (H) 04/28/2020 04:49 AM    Triglyceride 132 04/28/2020 04:49 AM     No results found for: GPT  Lab Results   Component Value Date/Time    Hemoglobin A1c 7.1 (H) 04/28/2020 02:28 PM         Cardiology Procedures:   Results for orders placed or performed during the hospital encounter of 04/27/20   EKG, 12 LEAD, INITIAL   Result Value Ref Range    Ventricular Rate 123 BPM    Atrial Rate 123 BPM    P-R Interval 144 ms    QRS Duration 136 ms    Q-T Interval 336 ms    QTC Calculation (Bezet) 481 ms    Calculated P Axis 104 degrees    Calculated R Axis -58 degrees    Calculated T Axis 70 degrees    Diagnosis       Sinus tachycardia with premature atrial complexes  Right bundle branch block  Left anterior fascicular block  Bifascicular block  Abnormal ECG               Impression / Plan:    Patient Active Problem List   Diagnosis Code    Elevated troponin R79.89    Hypertensive emergency I16.1    Hypokalemia E87.6    Lung nodule R91.1    CAP (community acquired pneumonia) J18.9     Shortness of breath on exertion   Abnormal troponin: No clear evidence of ACS, due to demand ischemia   Abnormal EKG      Echocardiogram done today revealed Left Ventricle: Normal cavity size, wall thickness and systolic function (ejection fraction normal). The estimated ejection fraction is 55 - 60%. There is mild (grade 1) left ventricular diastolic dysfunction E'A ratio= 0.90. Pulmonary Artery: Pulmonary arteries not well visualized. Pulmonary arterial systolic pressure (PASP) is 31 mmHg. Pulmonary hypertension found to be mild. Start Aspirin 81 mg once a day. Continue Losartan/HCTZ and Amlodipine.   Schedule for Lexiscan stress test  Continue management as per hospital medicine  Plan discussed with patient and she verbally understood and agree for stress test.  NPO after mid night.      Signed By: Negar Conner MD     April 28, 2020

## 2020-04-29 NOTE — CONSULTS
[facility]  Pulmonary, Critical Care, and Sleep Medicine    Name: Rizwana Quezada MRN: [id number]   : 1950 Hospital: [facility] [place]   Date: 2020        Pulmonary Critical Care Note    IMPRESSION:   Patient Active Problem List   Diagnosis Code    Acute on chronic hypercapnic hypoxic respiratory failure J96.21, J96.22    Encephalopathy, metabolic vs PRES vs hypercapnic G93.40    Pulmonary edema J81.1    CAP (community acquired pneumonia) J18.9    Hypertension I10    Possible RACHEL or OHS E66.2    Elevated troponin R79.89    CKD stage 3 N18.3    Hypokalemia E87.6    Lung nodule R91.1    Morbid obesity            RECOMMENDATIONS:   BiPAP may be needed per follow up ABG- adjust settings per ABG or for goal SPO2 91-95%  Supplemental O2 via HF NC, titrate flow for goal SPO2> 91%. Patient requiring 24% FiO2 not very high suggesting metabolic causes for changes in mentation  Aspiration prevention bundle, head of the bed at 30' all times  Bronchodilators PRN, pulmonary hygiene care  Antibiotic choice: Levofloxacin  Check UCx  Monitor BP. May use PRN Labetalol for SBP > 160 mm Hg. Goal SBP < 160 mm Hg, DBP < 100 mm Hg  Possible stress test when more stable per cardiology. Cardiology following  Diuresis as needed and tolerated  Glycemic control  Any MRI, neurological evaluation based on clinical course  CT head limited but nil acute  AM labs. Diet NPO  Schmitz Bundle Followed    Will defer respective systems problem management to primary and other consultant and follow patient in ICU with primary and other medical team  Further recommendations will be based on the patient's response to recommended treatment and results of the investigation ordered. Quality Care: PPI, DVT prophylaxis, HOB elevated, Infection control all reviewed and addressed.   PAIN AND SEDATION: avoid any sedatives, hypnotics  · Skin/Wound: chronic venous stasis changes  · Nutrition: NPO  · Prophylaxis: DVT and GI Prophylaxis reviewed. · PT/OT eval and treat: when stable   · Lines/Tubes: lines PIV; zaragoza 4/29/20  ADVANCE DIRECTIVE: Full code  DISCUSSION: spoke with RN, RT, patient, hospitalist, cardiology  Events and notes from last 24 hours reviewed. Care plan discussed with nursing      Subjective/History:   Ms. Derrick Morales has been seen and evaluated as Dr. Bandar Diehl requested now for Acute on chronic hypercapnic hypoxic respiratory failure. Patient is a 81 y.o. female with pmhx for hypertension, DDD, obesity presents to the ED with SOB and MO admitted for hypertensive emergency, elevated troponin, lung nodule and abnormal chest xray treated for PNA, pulmonary edema. Her hospital course was complicated today by finding of change in mental status, somnolence; would awake and answer a simple question and then go back to sleep. ABG showed acute on chronic hypercapnia, C02 71, P02 117, ph 7.302. Stat CTA Chest no PE, CT head nil acute. Code Stroke was called; tele neurologist suspected more global changes. As per discussion with other providers, AAO x 3 yesterday. Non smoker and no hx of COPD, known RACHEL. This patient was discussed in detail with Goldie Hassan, Marjorie, addressed any possible COVID19 symptoms such as fever, cough, confirmed or possible contacts and any other pertinent signs, symptoms and no such findings or risks in this patient for Novel Coronavirus. Other information is limited. Review of Systems:  Review of systems not obtained due to patient factors. Past Medical History:  Past Medical History:   Diagnosis Date    Hypertension         Past Surgical History:  Past Surgical History:   Procedure Laterality Date    HX HIP REPLACEMENT      left hip        Medications:  Prior to Admission medications    Medication Sig Start Date End Date Taking? Authorizing Provider   traMADol (ULTRAM) 50 mg tablet Take 1 Tab by mouth every six (6) hours as needed for Pain.  Max Daily Amount: 200 mg. 8/1/16 JOSEFINA Rutherford ([facility]   Karyn Sharma DO   losartan 50 mg tab 100 mg, hydrochlorothiazide 25 mg tab 25 mg Take  by mouth daily. Kennedy Dang MD   amLODIPine (NORVASC) 10 mg tablet Take  by mouth daily. Kennedy Dang MD   meloxicam (MOBIC) 15 mg tablet Take 15 mg by mouth daily. Kennedy Dang MD       Current Facility-Administered Medications   Medication Dose Route Frequency    levoFLOXacin (LEVAQUIN) 500 mg in D5W IVPB  500 mg IntraVENous Q24H    aspirin delayed-release tablet 81 mg  81 mg Oral DAILY    heparin (porcine) injection 5,000 Units  5,000 Units SubCUTAneous Q8H    sodium chloride (NS) flush 5-40 mL  5-40 mL IntraVENous Q8H    losartan/hydroCHLOROthiazide (HYZAAR) 100/25 mg   Oral DAILY    amLODIPine (NORVASC) tablet 10 mg  10 mg Oral DAILY       Allergy:  Allergies   Allergen Reactions    Rocephin [Ceftriaxone] Other (comments)     Flushing        Social History:  Social History     Tobacco Use    Smoking status: Never Smoker    Smokeless tobacco: Never Used   Substance Use Topics    Alcohol use: No    Drug use: No        Family History:  History reviewed. No pertinent family history. Objective:   Vital Signs:    Blood pressure 144/78, pulse (!) 102, temperature 98.9 °F (37.2 °C), resp. rate 20, height 5' 5\" (1.651 m), weight 125.3 kg (276 lb 3.2 oz), SpO2 (P) 96 %. Body mass index is 45.96 kg/m². O2 Device: (P) Hi flow nasal cannula   O2 Flow Rate (L/min): 0.5 l/min   Temp (24hrs), Av.5 °F (36.9 °C), Min:98 °F (36.7 °C), Max:99 °F (37.2 °C)         Intake/Output:   Last shift:      No intake/output data recorded.   Last 3 shifts:  1901 -  0700  In: 360 [P.O.:360]  Out: 650 [Urine:650]    Intake/Output Summary (Last 24 hours) at 2020 1104  Last data filed at 2020 1552  Gross per 24 hour   Intake 120 ml   Output 400 ml   Net -280 ml       Physical Exam:  General: Lethargic, in mild respiratory distress and acyanotic and disoriented times 3, protecting airways, appears older than stated age, morbidly obese, on HF NO2  HEENT: PERRL, fundi benign, throat normal without erythema or exudate  Neck: No abnormally enlarged lymph nodes or thyroid, supple  Chest: normal, obese chest wall  Lungs: moderate air entry, few rales and rhonchi scattered bilaterally, breathing abdominal, normal percussion anterior chest bilaterally, no tenderness/ rash  Heart: Regular rate and rhythm, S1S2 present or without murmur or extra heart sounds  Abdomen: obese, bowel sounds normoactive, tympanic, abdomen is soft without significant tenderness, masses, organomegaly or guarding, rigidity, rebound  Extremity: trace ankle bl LE edema; no cyanosis, clubbing  Capillary refill: normal  Neuro: lethargic, follows simple commands such as opening eyes to calling name, tracking in room, disoriented, moves all extremities well, no involuntary movements, exam limitations  Skin: Skin color, texture, turgor fair. Skin dry, warm, non-diaphoretic    Data:     Recent Results (from the past 24 hour(s))   HEMOGLOBIN A1C WITH EAG    Collection Time: 04/28/20  2:28 PM   Result Value Ref Range    Hemoglobin A1c 7.1 (H) 4.2 - 5.6 %    Est. average glucose 208 mg/dL   METABOLIC PANEL, COMPREHENSIVE    Collection Time: 04/29/20  3:20 AM   Result Value Ref Range    Sodium 141 136 - 145 mmol/L    Potassium 3.2 (L) 3.5 - 5.5 mmol/L    Chloride 102 100 - 111 mmol/L    CO2 33 (H) 21 - 32 mmol/L    Anion gap 6 3.0 - 18 mmol/L    Glucose 140 (H) 74 - 99 mg/dL    BUN 25 (H) 7.0 - 18 MG/DL    Creatinine 1.44 (H) 0.6 - 1.3 MG/DL    BUN/Creatinine ratio 17 12 - 20      GFR est AA 44 (L) >60 ml/min/1.73m2    GFR est non-AA 36 (L) >60 ml/min/1.73m2    Calcium 8.1 (L) 8.5 - 10.1 MG/DL    Bilirubin, total 0.5 0.2 - 1.0 MG/DL    ALT (SGPT) 22 13 - 56 U/L    AST (SGOT) 16 10 - 38 U/L    Alk.  phosphatase 57 45 - 117 U/L    Protein, total 6.4 6.4 - 8.2 g/dL    Albumin 3.0 (L) 3.4 - 5.0 g/dL    Globulin 3.4 2.0 - 4.0 g/dL    A-G Ratio 0.9 0.8 - 1.7     CBC WITH AUTOMATED DIFF    Collection Time: 04/29/20  3:20 AM   Result Value Ref Range    WBC 7.0 4.6 - 13.2 K/uL    RBC 5.25 4.20 - 5.30 M/uL    HGB 12.2 12.0 - 16.0 g/dL    HCT 40.9 35.0 - 45.0 %    MCV 77.9 74.0 - 97.0 FL    MCH 23.2 (L) 24.0 - 34.0 PG    MCHC 29.8 (L) 31.0 - 37.0 g/dL    RDW 15.5 (H) 11.6 - 14.5 %    PLATELET 836 267 - 740 K/uL    MPV 10.3 9.2 - 11.8 FL    NEUTROPHILS 72 40 - 73 %    LYMPHOCYTES 22 21 - 52 %    MONOCYTES 6 3 - 10 %    EOSINOPHILS 0 0 - 5 %    BASOPHILS 0 0 - 2 %    ABS. NEUTROPHILS 5.0 1.8 - 8.0 K/UL    ABS. LYMPHOCYTES 1.5 0.9 - 3.6 K/UL    ABS. MONOCYTES 0.4 0.05 - 1.2 K/UL    ABS. EOSINOPHILS 0.0 0.0 - 0.4 K/UL    ABS. BASOPHILS 0.0 0.0 - 0.1 K/UL    DF AUTOMATED     MAGNESIUM    Collection Time: 04/29/20  3:20 AM   Result Value Ref Range    Magnesium 2.0 1.6 - 2.6 mg/dL   CARDIAC PANEL,(CK, CKMB & TROPONIN)    Collection Time: 04/29/20  3:20 AM   Result Value Ref Range     26 - 192 U/L    CK - MB 3.0 <3.6 ng/ml    CK-MB Index 1.6 0.0 - 4.0 %    Troponin-I, QT 0.12 (H) 0.0 - 0.045 NG/ML   AMMONIA    Collection Time: 04/29/20  4:20 AM   Result Value Ref Range    Ammonia 22 11 - 32 UMOL/L   POC G3    Collection Time: 04/29/20  8:07 AM   Result Value Ref Range    Device: NASAL CANNULA      Flow rate (POC) 2.0 L/M    FIO2 (POC) 28 %    pH (POC) 7.302 (L) 7.35 - 7.45      pCO2 (POC) 71.0 (H) 35.0 - 45.0 MMHG    pO2 (POC) 117 (H) 80 - 100 MMHG    HCO3 (POC) 35.1 (H) 22 - 26 MMOL/L    sO2 (POC) 98 (H) 92 - 97 %    Base excess (POC) 9 mmol/L    Allens test (POC) YES      Total resp.  rate 20      Site LEFT RADIAL      Specimen type (POC) ARTERIAL      Performed by Samra Fowler    GLUCOSE, POC    Collection Time: 04/29/20  8:19 AM   Result Value Ref Range    Glucose (POC) 137 (H) 70 - 110 mg/dL   EKG, 12 LEAD, SUBSEQUENT    Collection Time: 04/29/20  8:26 AM   Result Value Ref Range    Ventricular Rate 102 BPM    Atrial Rate 102 BPM    P-R Interval 128 ms    QRS Duration 140 ms    Q-T Interval 410 ms    QTC Calculation (Bezet) 534 ms    Calculated P Axis -25 degrees    Calculated R Axis -70 degrees    Calculated T Axis 67 degrees    Diagnosis       Sinus tachycardia  Right bundle branch block  Left anterior fascicular block  Bifascicular block  Minimal voltage criteria for LVH, may be normal variant  Abnormal ECG  When compared with ECG of 27-APR-2020 16:31,  premature atrial complexes are no longer present     CARDIAC PANEL,(CK, CKMB & TROPONIN)    Collection Time: 04/29/20  9:30 AM   Result Value Ref Range     (H) 26 - 192 U/L    CK - MB 2.7 <3.6 ng/ml    CK-MB Index 1.3 0.0 - 4.0 %    Troponin-I, QT 0.08 (H) 0.0 - 0.990 NG/ML   METABOLIC PANEL, BASIC    Collection Time: 04/29/20  9:30 AM   Result Value Ref Range    Sodium 140 136 - 145 mmol/L    Potassium 3.6 3.5 - 5.5 mmol/L    Chloride 102 100 - 111 mmol/L    CO2 33 (H) 21 - 32 mmol/L    Anion gap 5 3.0 - 18 mmol/L    Glucose 131 (H) 74 - 99 mg/dL    BUN 26 (H) 7.0 - 18 MG/DL    Creatinine 1.23 0.6 - 1.3 MG/DL    BUN/Creatinine ratio 21 (H) 12 - 20      GFR est AA 53 (L) >60 ml/min/1.73m2    GFR est non-AA 44 (L) >60 ml/min/1.73m2    Calcium 8.2 (L) 8.5 - 10.1 MG/DL   CBC WITH AUTOMATED DIFF    Collection Time: 04/29/20  9:30 AM   Result Value Ref Range    WBC 7.3 4.6 - 13.2 K/uL    RBC 5.10 4.20 - 5.30 M/uL    HGB 11.8 (L) 12.0 - 16.0 g/dL    HCT 40.0 35.0 - 45.0 %    MCV 78.4 74.0 - 97.0 FL    MCH 23.1 (L) 24.0 - 34.0 PG    MCHC 29.5 (L) 31.0 - 37.0 g/dL    RDW 15.6 (H) 11.6 - 14.5 %    PLATELET 041 523 - 195 K/uL    MPV 10.6 9.2 - 11.8 FL    NEUTROPHILS 74 (H) 40 - 73 %    LYMPHOCYTES 20 (L) 21 - 52 %    MONOCYTES 6 3 - 10 %    EOSINOPHILS 0 0 - 5 %    BASOPHILS 0 0 - 2 %    ABS. NEUTROPHILS 5.4 1.8 - 8.0 K/UL    ABS. LYMPHOCYTES 1.5 0.9 - 3.6 K/UL    ABS. MONOCYTES 0.5 0.05 - 1.2 K/UL    ABS. EOSINOPHILS 0.0 0.0 - 0.4 K/UL    ABS.  BASOPHILS 0.0 0.0 - 0.1 K/UL    DF AUTOMATED     MAGNESIUM    Collection Time: 04/29/20  9:30 AM   Result Value Ref Range    Magnesium 2.2 1.6 - 2.6 mg/dL           Recent Labs     04/29/20  0807 04/27/20  1711   FIO2I 28 21   HCO3I 35.1* 33.2*   PCO2I 71.0* 56.6*   PHI 7.302* 7.376   PO2I 117* 63*       All Micro Results     Procedure Component Value Units Date/Time    CULTURE, URINE [[id number]]     Order Status:  Sent Specimen:  Cath Urine           Telemetry: normal sinus rhythm    4/28/20 ECHO  · Left Ventricle: Normal cavity size, wall thickness and systolic function (ejection fraction normal). The estimated ejection fraction is 55 - 60%. There is mild (grade 1) left ventricular diastolic dysfunction E'A ratio= 0.90. · Pulmonary Artery: Pulmonary arteries not well visualized. Pulmonary arterial systolic pressure (PASP) is 31 mmHg. Pulmonary hypertension found to be mild. Imaging:  [x]I have personally reviewed the patients chest radiographs images and report   4/29/20  Results from Hospital Encounter encounter on 04/27/20   XR CHEST PORT    Narrative EXAM: XR CHEST PORT    CLINICAL INDICATION/HISTORY: AMS  -Additional: None    COMPARISON: 4/27/2020    TECHNIQUE: Portable frontal view of the chest    _______________    FINDINGS:    SUPPORT DEVICES: None. HEART AND MEDIASTINUM: Stable cardiomediastinal silhouette. LUNGS AND PLEURAL SPACES: Elevated right hemidiaphragm. Mildly prominent  interstitial markings versus hypoinflation. No dense consolidation, large  effusion or pneumothorax.    _______________      Impression IMPRESSION:    Elevated right hemidiaphragm. Mildly prominent interstitial markings versus  hypoinflation.         Results from [place] encounter on 04/27/20   CT HEAD WO CONT    Narrative EXAM: CT HEAD WO CONT    CLINICAL INDICATION/HISTORY: change in mental status    COMPARISON: April 29, 2020    TECHNIQUE: Axial CT imaging of the head was performed without intravenous  contrast. Sagittal and coronal reconstructions are provided. One or more dose  reduction techniques were used on this CT: automated exposure control,  adjustment of the mAs and/or kVp according to patient size, and iterative  reconstruction techniques. The specific techniques used on this CT exam have  been documented in the patient's electronic medical record. Digital Imaging and  Communications in Medicine (DICOM) format image data are available to  nonaffiliated external healthcare facilities or entities on a secure, media  free, reciprocally searchable basis with patient authorization for at least a  12-month period after this study      _______________    FINDINGS: Image quality degraded by moderate to severe motion artifact. BRAIN AND POSTERIOR FOSSA: The sulci, folia, ventricles and basal cisterns are  within normal limits for the patient's age. There is no intracranial hemorrhage,  mass effect, or midline shift. There are no areas of abnormal parenchymal  attenuation. EXTRA-AXIAL SPACES AND MENINGES: There are no abnormal extra-axial fluid  collections. CALVARIUM: Intact. SINUSES: Clear. OTHER: None.    _______________      Impression IMPRESSION:    Limited study secondary to motion. No definite acute intracranial abnormalities. [x]See my orders for details    My assessment, plan of care, findings, medications, side effects etc were discussed with:  [x]nursing []PT/OT    [x]respiratory therapy [x]Goldie Carrington   []family [x]Patient     [x]Total critical care time exclusive of procedures 60 minutes with complex decision making performed and > 50% time spent in face to face evaluation.     Idris Oscar MD

## 2020-04-29 NOTE — PROGRESS NOTES
Transition of care  Dr. Blaine Trujillo informed cm patient has been transferred to ICU. Cm did not enter room due to covid restrictions  CM However, did speak to patient on phone yesterday  Telephone call with patient. Introduced cm and job responsibilities  Patient states her d/c plan is to return home. Patient reports she lives with her sister. She has a son who lives nearby. patient reports she does still drive. She has medicare for insurance  She has Dr. Blaise Urena for pcp. She does not have or need any DME per patient. She does have a cane but states she does not really use  Patient is currently wearing oxygen. Care Management Interventions  PCP Verified by CM: Yes  Transition of Care Consult (CM Consult): Discharge Planning  Current Support Network: Relative's Home  Confirm Follow Up Transport: Family  The Plan for Transition of Care is Related to the Following Treatment Goals : anticiapte dc home when medically cleared.  lives with sister  The Patient and/or Patient Representative was Provided with a Choice of Provider and Agrees with the Discharge Plan?: Yes  Freedom of Choice List was Provided with Basic Dialogue that Supports the Patient's Individualized Plan of Care/Goals, Treatment Preferences and Shares the Quality Data Associated with the Providers?: Yes

## 2020-04-29 NOTE — ROUTINE PROCESS
RRT called to room 336 @ 08:17. On arrival found two nurses in room with Doctor Yanci Kaplan assessing the patient for AMS. Patient minimum responsive to verbal. Patient had a remarkable episode of elevated B/P earlier this am. STAT CT scan of head ordered, \"Code Stroke\" called, ECG AND X Ray ordered. Accompanied patient with RN to CT Scan and returned to room when finished. Doctor asked for Cardiac enzymes, (kush  a [place]) once we returned from CT Scan via second IV,  handed blood specimens off to . Stayed with patient and RN until no other orders were noted. Cleared @ 10:00.  
MICHELLE LENNON

## 2020-04-29 NOTE — PROGRESS NOTES
TRANSFER - IN REPORT:    Verbal report received from Zhang Truong, [facility] (name) on Juana Virgen Minor  [facility] [address] [place], [place] [address] in patient condition(Respiratory distress)      Report consisted of patients Situation, Background, Assessment and   Recommendations(SBAR). Information from the following report(s) SBAR, Kardex, Intake/Output and Recent Results was reviewed with the receiving nurse. Opportunity for questions and clarification was provided. Assessment completed upon patients arrival to unit and care assumed. Pt assisted onto ICU bed, attached to monitor, VSS. Pt alert, following commands, garbled speech. Schmitz placed per order. 1200  Bedside, Verbal and Written shift change report given to PAULINA Stinson (oncoming nurse) by Freida Cronin RN (offgoing nurse). Report included the following information SBAR, Kardex, Intake/Output and Recent Results.

## 2020-04-29 NOTE — PROGRESS NOTES
1915  Assumed care of pt   2000  Shift assessment complete   2330  MEWS score 3 and pt mental status has changed - provider called, orders received via telephone with readback  0000  Pt taken down to CT  0023  Pt back from CT and reassessment done  0400  Reassessment done   0420  /184, manual taken 200/140. Hospitalist called, orders received via telephone with readback  0501  VS taken again; /70, MEWS score 2. Bedside and Verbal shift change report given to Magdalene Yang RN (oncoming nurse) by Darron Parker RN (offgoing nurse). Report included the following information SBAR, Kardex, ED Summary, Intake/Output, MAR, Recent Results, Med Rec Status and Cardiac Rhythm SR; ST; BBB; PAC.

## 2020-04-29 NOTE — PROGRESS NOTES
Problem: Falls - Risk of  Goal: *Absence of Falls  Description: Document Debi Johnson Fall Risk and appropriate interventions in the flowsheet.   Outcome: Progressing Towards Goal  Note: Fall Risk Interventions:  Mobility Interventions: Assess mobility with egress test, Patient to call before getting OOB, Utilize walker, cane, or other assistive device         Medication Interventions: Patient to call before getting OOB, Teach patient to arise slowly    Elimination Interventions: Call light in reach, Patient to call for help with toileting needs              Problem: Patient Education: Go to Patient Education Activity  Goal: Patient/Family Education  Outcome: Progressing Towards Goal     Problem: Hypertension  Goal: *Blood pressure within specified parameters  Outcome: Progressing Towards Goal  Goal: *Fluid volume balance  Outcome: Progressing Towards Goal  Goal: *Labs within defined limits  Outcome: Progressing Towards Goal     Problem: Patient Education: Go to Patient Education Activity  Goal: Patient/Family Education  Outcome: Progressing Towards Goal     Problem: General Medical Care Plan  Goal: *Vital signs within specified parameters  Outcome: Progressing Towards Goal  Goal: *Labs within defined limits  Outcome: Progressing Towards Goal  Goal: *Absence of infection signs and symptoms  Outcome: Progressing Towards Goal  Goal: *Optimal pain control at patient's stated goal  Outcome: Progressing Towards Goal  Goal: *Skin integrity maintained  Outcome: Progressing Towards Goal  Goal: *Fluid volume balance  Outcome: Progressing Towards Goal  Goal: *Optimize nutritional status  Outcome: Progressing Towards Goal  Goal: *Anxiety reduced or absent  Outcome: Progressing Towards Goal  Goal: *Progressive mobility and function (eg: ADL's)  Outcome: Progressing Towards Goal     Problem: Patient Education: Go to Patient Education Activity  Goal: Patient/Family Education  Outcome: Progressing Towards Goal

## 2020-04-29 NOTE — PROGRESS NOTES
0700 Assumed patient care from off-going nurse Luis Snider RN. Whiteboard updated, bed wheels locked, bed in lowest position, patient is dyspneic on rest and use of accessory muscles, SpO- 99%, and call bell within reach. 0740 Assessment complete. Patient resting comfortably in bed. Dyspneic on rest, no complaint of pain. Expiratory wheezing. Excessive accessory muscle use. AMS- unable to verbalize name, location, , or situation. Spoke with Dr. Naima Amos. Ordered- CT chest and stat ABG. Noted weakness on L side. 5151 RRT, Code S called. Dr. Naima Amos ordered stat CTA Chest, Stat CT head, CBC, Magnesium, BMP, EKG, Cardiac Enzymes, and Tele-Neurology. 1110 TRANSFER - OUT REPORT:    Verbal report [facility] Paloma Galindo [facility]  being transferred to ICU(unit) for Change in patient condition. Report consisted of patients Situation, Background, Assessment and   Recommendations(SBAR). Information from the following report(s) SBAR was reviewed with the receiving nurse.     Lines:   Peripheral IV 20 Left Antecubital (Active)   Site Assessment Clean, dry, & intact 2020  7:40 AM   Phlebitis Assessment 0 2020  7:40 AM   Infiltration Assessment 0 2020  7:40 AM   Dressing Status Clean, dry, & intact 2020  7:40 AM   Dressing Type Transparent;Tape 2020  7:40 AM   Hub Color/Line Status Pink;Flushed;Patent;Capped 2020  7:40 AM   Action Taken Open ports on tubing capped 2020  7:40 AM   Alcohol Cap Used Yes 2020  7:40 AM       Peripheral IV 20 Right Hand (Active)   Site Assessment Clean, dry, & intact 2020 10:21 AM   Phlebitis Assessment 0 2020 10:21 AM   Infiltration Assessment 0 2020 10:21 AM   Dressing Status Clean, dry, & intact 2020 10:21 AM   Dressing Type Transparent 2020 10:21 AM   Hub Color/Line Status Pink 2020 10:21 AM   Action Taken Other (comment) 2020 10:21 AM   Alcohol Cap Used Yes 2020 10:21 AM       Peripheral IV 04/29/20 Right; Anterior; Lower Arm (Active)   Site Assessment Clean, dry, & intact 4/29/2020 10:25 AM   Phlebitis Assessment 0 4/29/2020 10:25 AM   Infiltration Assessment 0 4/29/2020 10:25 AM   Dressing Status Clean, dry, & intact 4/29/2020 10:25 AM   Dressing Type Transparent; Other (comments) 4/29/2020 10:25 AM   Hub Color/Line Status Pink 4/29/2020 10:25 AM   Action Taken Other (comment) 4/29/2020 10:25 AM   Alcohol Cap Used Yes 4/29/2020 10:25 AM        Opportunity for questions and clarification was provided. Patient transported with:   O2 @ 0.5 liters     1105 Spoke with patients oldest son on phone. Updated on patient status, condition, and transfer status.

## 2020-04-29 NOTE — PROGRESS NOTES
Hospitalist Progress Note    Patient: Bhanu Velez MRN: [id number]  CSN: [id number]    YOB: 1950  Age: 81 y.o. Sex: female    DOA: 4/27/2020 LOS:  LOS: 2 days          Chief Complaint:    SOB    Assessment/Plan   80 y/o female with h/o hypertension, degenerative disc disease, obesity presents to the ED with SOB and MO admitted for hypertensive emergency, elevated troponin, lung nodule and abnormal chest xray suggestive of PNA. RAPID RESPONSE NOTE   Called because of change in mental status  Patient very sleepy, difficult to arouse. Will awake and answer a simple question and then go right back to sleep. Stat ABG obtained: hypercapnia, C02 71, P02 117, ph 7.302  Stat CTA Chest   PE revealed left sided weakness, weakened  strength and decreased withdraw to pain and let her hand fall toward her face without re-directing it, so sent for stat head CT and called Code Stroke    CODE STROKE   Last known normal 2000 last night  Tele neurologist did not appreciate any focal impairments, and thought the impairment was more global.   Concerned about metabolic encephalopathy. Will order MRI Brain, MRA Brain and MRA Neck.      D/w Son Marin Richter (youngest son) -[phone]  (patient also has oldest son, Enmanuel Jerry, [phone]_  Serge Sheets is listed in her chart, she is patient's sister    Transfer to ICU for higher level of care for:   Acute hypercapnic and hypoxic respiratory failure  Altered mental status: metabolic encephalopathy      Hypertensive emergency     Uncontrolled HTN   Home meds restarted   Labetalol prn      Elevated troponin   Likely cardiac strain due to severely elevated blood pressure  Cardiology consult  -Dr. Jesus Manuel Scales      PNA -presumptive   Levaquin because rocephin allergic reaction this admission      Hypokalemia -resolved      Lung nodule   13 x 12 mm nodular density in the right middle lobe   Will recheck after treating for CAP     Rocephin allergy -flushing and discomfort after start of admin of rocephin      DVT Prophylaxis -Heparin     Disposition :  Patient Active Problem List   Diagnosis Code    Elevated troponin R79.89    Hypertensive emergency I16.1    Hypokalemia E87.6    Lung nodule R91.1    CAP (community acquired pneumonia) J18.9       Subjective:    Change in status overnight:   sensorium change prompted CT head which was negative     This AM, experiencing respiratory distress, increased WOB, crackles in bases, SPO2 99%   very sleepy but responds    RRT called     Review of systems:    Constitutional: denies fevers, chills, myalgias  Respiratory: denies SOB, cough  Cardiovascular: denies chest pain, palpitations  Gastrointestinal: denies nausea, vomiting, diarrhea      Vital signs/Intake and Output:  Visit Vitals  /90   Pulse (!) 104   Temp 99 °F (37.2 °C)   Resp 20   Ht 5' 5\" (1.651 m)   Wt 125.3 kg (276 lb 3.2 oz)   SpO2 99%   BMI 45.96 kg/m²     Current Shift:  No intake/output data recorded.   Last three shifts:  04/27 1901 - 04/29 0700  In: 360 [P.O.:360]  Out: 650 [Urine:650]    Exam:    General: Well developed, alert, NAD, OX3  Head/Neck: NCAT, supple, No masses, No lymphadenopathy  CVS:Regular rate and rhythm, no M/R/G, S1/S2 heard, no thrill  Lungs:Clear to auscultation bilaterally, no wheezes, rhonchi, or rales  Abdomen: Soft, Nontender, No distention, Normal Bowel sounds, No hepatomegaly  Extremities: No C/C/E, pulses palpable 2+  Skin:normal texture and turgor, no rashes, no lesions  Neuro:grossly normal , follows commands  Psych:appropriate                Labs: Results:       Chemistry Recent Labs     04/29/20  0320 04/28/20  0449 04/27/20  1630   * 118* 152*    141 142   K 3.2* 3.4* 3.1*    101 103   CO2 33* 34* 33*   BUN 25* 17 15   CREA 1.44* 0.96 0.98   CA 8.1* 8.2* 8.6   AGAP 6 6 6   BUCR 17 18 15   AP 57 57 68   TP 6.4 6.4 7.1   ALB 3.0* 3.0* 3.2*   GLOB 3.4 3.4 3.9   AGRAT 0.9 0.9 0.8      CBC w/Diff Recent Labs     04/29/20  0320 04/28/20  0449 04/27/20  1630   WBC 7.0 9.5 7.1   RBC 5.25 5.45* 5.77*   HGB 12.2 12.6 13.4   HCT 40.9 41.7 44.2    370 359   GRANS 72  --  67   LYMPH 22  --  28   EOS 0  --  0      Cardiac Enzymes Recent Labs     04/29/20  0320 04/28/20  0950    130   CKND1 1.6 2.5      Coagulation Recent Labs     04/27/20  1630   PTP 12.9   INR 1.0       Lipid Panel Lab Results   Component Value Date/Time    Cholesterol, total 174 04/28/2020 04:49 AM    HDL Cholesterol 41 04/28/2020 04:49 AM    LDL, calculated 106.6 (H) 04/28/2020 04:49 AM    VLDL, calculated 26.4 04/28/2020 04:49 AM    Triglyceride 132 04/28/2020 04:49 AM    CHOL/HDL Ratio 4.2 04/28/2020 04:49 AM      BNP No results for input(s): BNPP in the last 72 hours.    Liver Enzymes Recent Labs     04/29/20  0320   TP 6.4   ALB 3.0*   AP 57   SGOT 16      Thyroid Studies Lab Results   Component Value Date/Time    TSH 1.98 04/28/2020 04:49 AM        Procedures/imaging: see electronic medical records for all procedures/Xrays and details which were not copied into this note but were reviewed prior to creation of Elizabeth Rodrigues MD

## 2020-04-30 PROBLEM — G93.41 METABOLIC ENCEPHALOPATHY: Status: ACTIVE | Noted: 2020-04-30

## 2020-04-30 PROBLEM — J96.02 ACUTE RESPIRATORY FAILURE WITH HYPERCAPNIA (HCC): Status: ACTIVE | Noted: 2020-04-30

## 2020-04-30 PROBLEM — E11.65 HYPERGLYCEMIA DUE TO TYPE 2 DIABETES MELLITUS (HCC): Status: ACTIVE | Noted: 2020-04-30

## 2020-04-30 LAB
ALBUMIN SERPL-MCNC: 2.9 G/DL (ref 3.4–5)
ALBUMIN/GLOB SERPL: 0.8 {RATIO} (ref 0.8–1.7)
ALP SERPL-CCNC: 51 U/L (ref 45–117)
ALT SERPL-CCNC: 19 U/L (ref 13–56)
ANION GAP SERPL CALC-SCNC: 5 MMOL/L (ref 3–18)
APPEARANCE UR: CLEAR
ARTERIAL PATENCY WRIST A: ABNORMAL
AST SERPL-CCNC: 18 U/L (ref 10–38)
BACTERIA SPEC CULT: NORMAL
BACTERIA URNS QL MICRO: ABNORMAL /HPF
BASE EXCESS BLD CALC-SCNC: 10 MMOL/L
BDY SITE: ABNORMAL
BILIRUB SERPL-MCNC: 0.6 MG/DL (ref 0.2–1)
BILIRUB UR QL: NEGATIVE
BNP SERPL-MCNC: 178 PG/ML (ref 0–900)
BODY TEMPERATURE: 98.9
BUN SERPL-MCNC: 27 MG/DL (ref 7–18)
BUN/CREAT SERPL: 25 (ref 12–20)
CALCIUM SERPL-MCNC: 8.5 MG/DL (ref 8.5–10.1)
CHLORIDE SERPL-SCNC: 104 MMOL/L (ref 100–111)
CO2 SERPL-SCNC: 33 MMOL/L (ref 21–32)
COLOR UR: YELLOW
CREAT SERPL-MCNC: 1.1 MG/DL (ref 0.6–1.3)
EPITH CASTS URNS QL MICRO: ABNORMAL /LPF (ref 0–5)
ERYTHROCYTE [DISTWIDTH] IN BLOOD BY AUTOMATED COUNT: 15.7 % (ref 11.6–14.5)
GAS FLOW.O2 O2 DELIVERY SYS: ABNORMAL L/MIN
GAS FLOW.O2 SETTING OXYMISER: 32 L/M
GLOBULIN SER CALC-MCNC: 3.5 G/DL (ref 2–4)
GLUCOSE BLD STRIP.AUTO-MCNC: 100 MG/DL (ref 70–110)
GLUCOSE BLD STRIP.AUTO-MCNC: 103 MG/DL (ref 70–110)
GLUCOSE BLD STRIP.AUTO-MCNC: 112 MG/DL (ref 70–110)
GLUCOSE BLD STRIP.AUTO-MCNC: 118 MG/DL (ref 70–110)
GLUCOSE BLD STRIP.AUTO-MCNC: 128 MG/DL (ref 70–110)
GLUCOSE SERPL-MCNC: 132 MG/DL (ref 74–99)
GLUCOSE UR STRIP.AUTO-MCNC: NEGATIVE MG/DL
HCO3 BLD-SCNC: 35.3 MMOL/L (ref 22–26)
HCT VFR BLD AUTO: 39.9 % (ref 35–45)
HGB BLD-MCNC: 11.8 G/DL (ref 12–16)
HGB UR QL STRIP: NEGATIVE
KETONES UR QL STRIP.AUTO: NEGATIVE MG/DL
LEUKOCYTE ESTERASE UR QL STRIP.AUTO: NEGATIVE
MAGNESIUM SERPL-MCNC: 2.3 MG/DL (ref 1.6–2.6)
MCH RBC QN AUTO: 23.2 PG (ref 24–34)
MCHC RBC AUTO-ENTMCNC: 29.6 G/DL (ref 31–37)
MCV RBC AUTO: 78.4 FL (ref 74–97)
NITRITE UR QL STRIP.AUTO: NEGATIVE
O2/TOTAL GAS SETTING VFR VENT: 0.26 %
PCO2 BLD: 65.7 MMHG (ref 35–45)
PH BLD: 7.34 [PH] (ref 7.35–7.45)
PH UR STRIP: 5 [PH] (ref 5–8)
PHOSPHATE SERPL-MCNC: 3.5 MG/DL (ref 2.5–4.9)
PLATELET # BLD AUTO: 354 K/UL (ref 135–420)
PMV BLD AUTO: 10.2 FL (ref 9.2–11.8)
PO2 BLD: 65 MMHG (ref 80–100)
POTASSIUM SERPL-SCNC: 3.3 MMOL/L (ref 3.5–5.5)
POTASSIUM SERPL-SCNC: 3.5 MMOL/L (ref 3.5–5.5)
PROT SERPL-MCNC: 6.4 G/DL (ref 6.4–8.2)
PROT UR STRIP-MCNC: 30 MG/DL
RBC # BLD AUTO: 5.09 M/UL (ref 4.2–5.3)
RBC #/AREA URNS HPF: ABNORMAL /HPF (ref 0–5)
SAO2 % BLD: 90 % (ref 92–97)
SERVICE CMNT-IMP: ABNORMAL
SERVICE CMNT-IMP: NORMAL
SODIUM SERPL-SCNC: 142 MMOL/L (ref 136–145)
SP GR UR REFRACTOMETRY: 1.03 (ref 1–1.03)
SPECIMEN TYPE: ABNORMAL
TOTAL RESP. RATE, ITRR: 20
UROBILINOGEN UR QL STRIP.AUTO: 1 EU/DL (ref 0.2–1)
WBC # BLD AUTO: 6.6 K/UL (ref 4.6–13.2)
WBC URNS QL MICRO: ABNORMAL /HPF (ref 0–5)

## 2020-04-30 PROCEDURE — 5A09357 ASSISTANCE WITH RESPIRATORY VENTILATION, LESS THAN 24 CONSECUTIVE HOURS, CONTINUOUS POSITIVE AIRWAY PRESSURE: ICD-10-PCS | Performed by: INTERNAL MEDICINE

## 2020-04-30 PROCEDURE — 83880 ASSAY OF NATRIURETIC PEPTIDE: CPT

## 2020-04-30 PROCEDURE — 74011250636 HC RX REV CODE- 250/636: Performed by: FAMILY MEDICINE

## 2020-04-30 PROCEDURE — 84100 ASSAY OF PHOSPHORUS: CPT

## 2020-04-30 PROCEDURE — 81001 URINALYSIS AUTO W/SCOPE: CPT

## 2020-04-30 PROCEDURE — 94660 CPAP INITIATION&MGMT: CPT

## 2020-04-30 PROCEDURE — 77010033711 HC HIGH FLOW OXYGEN

## 2020-04-30 PROCEDURE — 36415 COLL VENOUS BLD VENIPUNCTURE: CPT

## 2020-04-30 PROCEDURE — 85027 COMPLETE CBC AUTOMATED: CPT

## 2020-04-30 PROCEDURE — 84132 ASSAY OF SERUM POTASSIUM: CPT

## 2020-04-30 PROCEDURE — 65610000006 HC RM INTENSIVE CARE

## 2020-04-30 PROCEDURE — 82962 GLUCOSE BLOOD TEST: CPT

## 2020-04-30 PROCEDURE — 36600 WITHDRAWAL OF ARTERIAL BLOOD: CPT

## 2020-04-30 PROCEDURE — 83735 ASSAY OF MAGNESIUM: CPT

## 2020-04-30 PROCEDURE — 82803 BLOOD GASES ANY COMBINATION: CPT

## 2020-04-30 PROCEDURE — 74011250636 HC RX REV CODE- 250/636: Performed by: INTERNAL MEDICINE

## 2020-04-30 PROCEDURE — 87086 URINE CULTURE/COLONY COUNT: CPT

## 2020-04-30 RX ORDER — POTASSIUM CHLORIDE 7.45 MG/ML
10 INJECTION INTRAVENOUS
Status: COMPLETED | OUTPATIENT
Start: 2020-04-30 | End: 2020-05-01

## 2020-04-30 RX ORDER — HYDRALAZINE HYDROCHLORIDE 20 MG/ML
10 INJECTION INTRAMUSCULAR; INTRAVENOUS EVERY 6 HOURS
Status: DISCONTINUED | OUTPATIENT
Start: 2020-04-30 | End: 2020-04-30

## 2020-04-30 RX ORDER — POTASSIUM CHLORIDE 7.45 MG/ML
10 INJECTION INTRAVENOUS
Status: COMPLETED | OUTPATIENT
Start: 2020-04-30 | End: 2020-04-30

## 2020-04-30 RX ORDER — POTASSIUM CHLORIDE 7.45 MG/ML
10 INJECTION INTRAVENOUS
Status: DISPENSED | OUTPATIENT
Start: 2020-04-30 | End: 2020-04-30

## 2020-04-30 RX ORDER — ATORVASTATIN CALCIUM 20 MG/1
20 TABLET, FILM COATED ORAL DAILY
Status: DISCONTINUED | OUTPATIENT
Start: 2020-05-01 | End: 2020-05-04

## 2020-04-30 RX ORDER — LABETALOL HCL 20 MG/4 ML
10 SYRINGE (ML) INTRAVENOUS
Status: DISCONTINUED | OUTPATIENT
Start: 2020-04-30 | End: 2020-05-01

## 2020-04-30 RX ADMIN — Medication 10 ML: at 23:41

## 2020-04-30 RX ADMIN — POTASSIUM CHLORIDE 10 MEQ: 7.46 INJECTION, SOLUTION INTRAVENOUS at 20:53

## 2020-04-30 RX ADMIN — LEVOFLOXACIN 500 MG: 5 INJECTION, SOLUTION INTRAVENOUS at 09:01

## 2020-04-30 RX ADMIN — LABETALOL 20 MG/4 ML (5 MG/ML) INTRAVENOUS SYRINGE 20 MG: at 04:16

## 2020-04-30 RX ADMIN — LABETALOL 20 MG/4 ML (5 MG/ML) INTRAVENOUS SYRINGE 20 MG: at 21:41

## 2020-04-30 RX ADMIN — HEPARIN SODIUM 5000 UNITS: 5000 INJECTION INTRAVENOUS; SUBCUTANEOUS at 18:16

## 2020-04-30 RX ADMIN — HEPARIN SODIUM 5000 UNITS: 5000 INJECTION INTRAVENOUS; SUBCUTANEOUS at 09:01

## 2020-04-30 RX ADMIN — Medication 10 ML: at 14:35

## 2020-04-30 RX ADMIN — POTASSIUM CHLORIDE 10 MEQ: 7.46 INJECTION, SOLUTION INTRAVENOUS at 21:41

## 2020-04-30 RX ADMIN — POTASSIUM CHLORIDE 10 MEQ: 7.46 INJECTION, SOLUTION INTRAVENOUS at 11:30

## 2020-04-30 RX ADMIN — POTASSIUM CHLORIDE 10 MEQ: 7.46 INJECTION, SOLUTION INTRAVENOUS at 12:30

## 2020-04-30 RX ADMIN — HEPARIN SODIUM 5000 UNITS: 5000 INJECTION INTRAVENOUS; SUBCUTANEOUS at 23:41

## 2020-04-30 RX ADMIN — POTASSIUM CHLORIDE 10 MEQ: 7.46 INJECTION, SOLUTION INTRAVENOUS at 13:45

## 2020-04-30 NOTE — PROGRESS NOTES
[facility]  Pulmonary, Critical Care, and Sleep Medicine    Name: Tiffani Anderson MRN: [id number]   : 1950 Hospital: [facility] [place]   Date: 2020        Pulmonary Critical Care Note    IMPRESSION:   Patient Active Problem List   Diagnosis Code    Acute on chronic hypercapnic hypoxic respiratory failure J96.21, J96.22    Encephalopathy, metabolic vs PRES vs hypercapnic G93.40    Pulmonary edema J81.1    CAP (community acquired pneumonia) J18.9    Hypertension I10    Possible RACHEL or OHS E66.2    Elevated troponin R79.89    CKD stage 3 N18.3    Hypokalemia E87.6    Lung nodule R91.1    Morbid obesity       RECOMMENDATIONS:   Start BiPAP today with breaks around noon and earlier as needed with follow up ABG in AM- adjust settings per ABG or for goal SPO2 91-95%  Able to protect airways. CXR for follow up in AM  Patient has baseline hypercapnia and recent PCO2 on ABG are not far from baseline hence not completely explained changes in mentation with CO2 level alone  Supplemental O2 via HF NC when off of BiPAP, titrate flow for goal SPO2> 91%. Patient requiring 26% FiO2 not very high suggesting metabolic and other causes for changes in mentation possible  Aspiration prevention bundle, head of the bed at 30' all times  Bronchodilators PRN, pulmonary hygiene care  Antibiotic choice: Levofloxacin. Await UCx  Monitor BP. Hydralazine, Norvasc and PRN Labetalol for SBP > 160 mm Hg. Goal SBP < 160 mm Hg, DBP < 100 mm Hg  Possible stress test when more stable per cardiology. Cardiology following  Diuresis as needed and tolerated  Glycemic control  MRI, neurological evaluation  CT head limited but nil acute  AM labs.  Diet NPO  Schmitz Bundle Followed    Will defer respective systems problem management to primary and other consultant and follow patient in ICU with primary and other medical team  Further recommendations will be based on the patient's response to recommended treatment and results of the investigation ordered. Quality Care: PPI, DVT prophylaxis, HOB elevated, Infection control all reviewed and addressed. PAIN AND SEDATION: avoid any sedatives, hypnotics  · Skin/Wound: chronic venous stasis changes  · Nutrition: NPO  · Prophylaxis: DVT and GI Prophylaxis reviewed. · PT/OT eval and treat: when stable   · Lines/Tubes: lines PIV; zaragoza 4/29/20  ADVANCE DIRECTIVE: Full code  DISCUSSION: spoke with RN, RT, patient, Dr. Narda Sheets and notes from last 24 hours reviewed. Care plan discussed with nursing      Subjective/History:   Ms. Janine Morales has been seen and evaluated as Dr. Kwaku Plascencia requested now for Acute on chronic hypercapnic hypoxic respiratory failure. Patient is a 81 y.o. female with pmhx for hypertension, DDD, obesity presents to the ED with SOB and MO admitted for hypertensive emergency, elevated troponin, lung nodule and abnormal chest xray treated for PNA, pulmonary edema. Her hospital course was complicated today by finding of change in mental status, somnolence; would awake and answer a simple question and then go back to sleep. ABG showed acute on chronic hypercapnia, C02 71, P02 117, ph 7.302. Stat CTA Chest no PE, CT head nil acute. Code Stroke was called; tele neurologist suspected more global changes. As per discussion with other providers, AAO x 3 yesterday. Non smoker and no hx of COPD, known RACHEL. This patient was discussed in detail with Goldie Hassan, Marjorie, addressed any possible COVID19 symptoms such as fever, cough, confirmed or possible contacts and any other pertinent signs, symptoms and no such findings or risks in this patient for Novel Coronavirus. Other information is limited. 04/30/20  Patient remained on HF O2 without major events overnight  This AM, awake, follows simple commands, non-verbal, tracking in room but lethargic looking  Hemodynamically stable; HTN mostly stable-controlled  No fever. UO fair.  NPO    Review of Systems:  Review of systems not obtained due to patient factors. Past Medical History:  Past Medical History:   Diagnosis Date    Hypertension         Past Surgical History:  Past Surgical History:   Procedure Laterality Date    HX HIP REPLACEMENT      left hip        Medications:  Prior to Admission medications    Medication Sig Start Date End Date Taking? Authorizing Provider   traMADol (ULTRAM) 50 mg tablet Take 1 Tab by mouth every six (6) hours as needed for Pain. Max Daily Amount: 200 mg. 8/1/16   JOSEFINA Renteria   [facility]) [facility] 1/4/14   Maco Coad, DO   losartan 50 mg tab 100 mg, hydrochlorothiazide 25 mg tab 25 mg Take  by mouth daily. Other, MD Kennedy   amLODIPine (NORVASC) 10 mg tablet Take  by mouth daily. Kennedy Dang MD   meloxicam (MOBIC) 15 mg tablet Take 15 mg by mouth daily. Kennedy Dang MD       Current Facility-Administered Medications   Medication Dose Route Frequency    potassium chloride 10 mEq in 100 ml IVPB  10 mEq IntraVENous Q1H    hydrALAZINE (APRESOLINE) 20 mg/mL injection 10 mg  10 mg IntraVENous Q6H    insulin lispro (HUMALOG) injection   SubCUTAneous Q6H    levoFLOXacin (LEVAQUIN) 500 mg in D5W IVPB  500 mg IntraVENous Q24H    aspirin delayed-release tablet 81 mg  81 mg Oral DAILY    heparin (porcine) injection 5,000 Units  5,000 Units SubCUTAneous Q8H    sodium chloride (NS) flush 5-40 mL  5-40 mL IntraVENous Q8H    amLODIPine (NORVASC) tablet 10 mg  10 mg Oral DAILY       Allergy:  Allergies   Allergen Reactions    Rocephin [Ceftriaxone] Other (comments)     Flushing        Social History:  Social History     Tobacco Use    Smoking status: Never Smoker    Smokeless tobacco: Never Used   Substance Use Topics    Alcohol use: No    Drug use: No        Family History:  History reviewed. No pertinent family history. Objective:   Vital Signs:    Blood pressure 138/74, pulse 91, temperature 98.7 °F (37.1 °C), resp.  rate 16, height 5' 5\" (1.651 m), weight 125.3 kg (276 lb 3.2 oz), SpO2 98 %. Body mass index is 45.96 kg/m². O2 Device: BIPAP   O2 Flow Rate (L/min): 40 l/min   Temp (24hrs), Av.1 °F (37.3 °C), Min:98.4 °F (36.9 °C), Max:99.9 °F (37.7 °C)         Intake/Output:   Last shift:      701 - 1900  In: -   Out: 700 [Urine:700]  Last 3 shifts: 1901 -  07  In: -   Out: 548 [Urine:810]    Intake/Output Summary (Last 24 hours) at 2020 1226  Last data filed at 2020 1118  Gross per 24 hour   Intake    Output 1510 ml   Net -1510 ml       Physical Exam:  General: Lethargic, in no respiratory distress, protecting airways, appears older than stated age, morbidly obese, on HF NO2  HEENT: PERRL, throat normal without erythema or exudate  Neck: No abnormally enlarged lymph nodes or thyroid, supple  Chest: normal, obese chest wall  Lungs: moderate air entry, few rhonchi scattered bilaterally, normal percussion anterior chest bilaterally, no tenderness/ rash, exam limitations  Heart: Regular rate and rhythm, S1S2 present or without murmur or extra heart sounds  Abdomen: obese, bowel sounds normoactive, tympanic, soft without significant tenderness, masses, organomegaly or guarding, rigidity, rebound  Extremity: trace ankle bl LE edema; no cyanosis, clubbing  Capillary refill: normal  Neuro: lethargic, awake, follows simple commands, tracking in room, non-verbal, moves all extremities well, no involuntary movements, exam limitations  Skin: Skin color, texture, turgor fair.  Skin dry, warm, non-diaphoretic    Data:     Recent Results (from the past 24 hour(s))   GLUCOSE, POC    Collection Time: 20  5:35 PM   Result Value Ref Range    Glucose (POC) 113 (H) 70 - 110 mg/dL   GLUCOSE, POC    Collection Time: 20 11:58 PM   Result Value Ref Range    Glucose (POC) 118 (H) 70 - 110 mg/dL   CBC W/O DIFF    Collection Time: 20  5:00 AM   Result Value Ref Range    WBC 6.6 4.6 - 13.2 K/uL    RBC 5. 09 4. 20 - 5.30 M/uL    HGB 11.8 (L) 12.0 - 16.0 g/dL    HCT 39.9 35.0 - 45.0 %    MCV 78.4 74.0 - 97.0 FL    MCH 23.2 (L) 24.0 - 34.0 PG    MCHC 29.6 (L) 31.0 - 37.0 g/dL    RDW 15.7 (H) 11.6 - 14.5 %    PLATELET 323 598 - 895 K/uL    MPV 10.2 9.2 - 17.9 FL   METABOLIC PANEL, COMPREHENSIVE    Collection Time: 04/30/20  5:00 AM   Result Value Ref Range    Sodium 142 136 - 145 mmol/L    Potassium 3.3 (L) 3.5 - 5.5 mmol/L    Chloride 104 100 - 111 mmol/L    CO2 33 (H) 21 - 32 mmol/L    Anion gap 5 3.0 - 18 mmol/L    Glucose 132 (H) 74 - 99 mg/dL    BUN 27 (H) 7.0 - 18 MG/DL    Creatinine 1.10 0.6 - 1.3 MG/DL    BUN/Creatinine ratio 25 (H) 12 - 20      GFR est AA >60 >60 ml/min/1.73m2    GFR est non-AA 50 (L) >60 ml/min/1.73m2    Calcium 8.5 8.5 - 10.1 MG/DL    Bilirubin, total 0.6 0.2 - 1.0 MG/DL    ALT (SGPT) 19 13 - 56 U/L    AST (SGOT) 18 10 - 38 U/L    Alk. phosphatase 51 45 - 117 U/L    Protein, total 6.4 6.4 - 8.2 g/dL    Albumin 2.9 (L) 3.4 - 5.0 g/dL    Globulin 3.5 2.0 - 4.0 g/dL    A-G Ratio 0.8 0.8 - 1.7     NT-PRO BNP    Collection Time: 04/30/20  5:00 AM   Result Value Ref Range    NT pro- 0 - 900 PG/ML   MAGNESIUM    Collection Time: 04/30/20  5:00 AM   Result Value Ref Range    Magnesium 2.3 1.6 - 2.6 mg/dL   PHOSPHORUS    Collection Time: 04/30/20  5:00 AM   Result Value Ref Range    Phosphorus 3.5 2.5 - 4.9 MG/DL   GLUCOSE, POC    Collection Time: 04/30/20  5:13 AM   Result Value Ref Range    Glucose (POC) 128 (H) 70 - 110 mg/dL   POC G3    Collection Time: 04/30/20  5:17 AM   Result Value Ref Range    Device: High Flow Nasal Cannula      Flow rate (POC) 32 L/M    FIO2 (POC) 0.26 %    pH (POC) 7.339 (L) 7.35 - 7.45      pCO2 (POC) 65.7 (H) 35.0 - 45.0 MMHG    pO2 (POC) 65 (L) 80 - 100 MMHG    HCO3 (POC) 35.3 (H) 22 - 26 MMOL/L    sO2 (POC) 90 (L) 92 - 97 %    Base excess (POC) 10 mmol/L    Allens test (POC) N/A      Total resp. rate 20      Site RIGHT RADIAL      Patient temp.  98.9 Specimen type (POC) ARTERIAL      Performed by Kaz Avitia    GLUCOSE, POC    Collection Time: 04/30/20 11:23 AM   Result Value Ref Range    Glucose (POC) 100 70 - 110 mg/dL           Recent Labs     04/30/20  0517 04/29/20  1152 04/29/20  0807   FIO2I 0.26 24 28   HCO3I 35.3* 35.2* 35.1*   PCO2I 65.7* 61.0* 71.0*   PHI 7.339* 7.369 7.302*   PO2I 65* 60* 117*       All Micro Results     Procedure Component Value Units Date/Time    CULTURE, URINE [[id number]]     Order Status:  Sent Specimen:  Urine from Clean catch     CULTURE, URINE [[id number]] Collected:  04/29/20 1330    Order Status:  Completed Specimen:  Cath Urine Updated:  04/29/20 7019          Telemetry: normal sinus rhythm    4/28/20 ECHO  · Left Ventricle: Normal cavity size, wall thickness and systolic function (ejection fraction normal). The estimated ejection fraction is 55 - 60%. There is mild (grade 1) left ventricular diastolic dysfunction E'A ratio= 0.90. · Pulmonary Artery: Pulmonary arteries not well visualized. Pulmonary arterial systolic pressure (PASP) is 31 mmHg. Pulmonary hypertension found to be mild. Imaging:  [x]I have personally reviewed the patients chest radiographs images and report   4/29/20  Results from Hospital Encounter encounter on 04/27/20   XR CHEST PORT    Narrative EXAM: XR CHEST PORT    CLINICAL INDICATION/HISTORY: AMS  -Additional: None    COMPARISON: 4/27/2020    TECHNIQUE: Portable frontal view of the chest    _______________    FINDINGS:    SUPPORT DEVICES: None. HEART AND MEDIASTINUM: Stable cardiomediastinal silhouette. LUNGS AND PLEURAL SPACES: Elevated right hemidiaphragm. Mildly prominent  interstitial markings versus hypoinflation. No dense consolidation, large  effusion or pneumothorax.    _______________      Impression IMPRESSION:    Elevated right hemidiaphragm. Mildly prominent interstitial markings versus  hypoinflation.         Results from [place] encounter on 04/27/20   CT HEAD WO CONT    Narrative EXAM: CT HEAD WO CONT    CLINICAL INDICATION/HISTORY: change in mental status    COMPARISON: April 29, 2020    TECHNIQUE: Axial CT imaging of the head was performed without intravenous  contrast. Sagittal and coronal reconstructions are provided. One or more dose  reduction techniques were used on this CT: automated exposure control,  adjustment of the mAs and/or kVp according to patient size, and iterative  reconstruction techniques. The specific techniques used on this CT exam have  been documented in the patient's electronic medical record. Digital Imaging and  Communications in Medicine (DICOM) format image data are available to  nonaffiliated external healthcare facilities or entities on a secure, media  free, reciprocally searchable basis with patient authorization for at least a  12-month period after this study      _______________    FINDINGS: Image quality degraded by moderate to severe motion artifact. BRAIN AND POSTERIOR FOSSA: The sulci, folia, ventricles and basal cisterns are  within normal limits for the patient's age. There is no intracranial hemorrhage,  mass effect, or midline shift. There are no areas of abnormal parenchymal  attenuation. EXTRA-AXIAL SPACES AND MENINGES: There are no abnormal extra-axial fluid  collections. CALVARIUM: Intact. SINUSES: Clear. OTHER: None.    _______________      Impression IMPRESSION:    Limited study secondary to motion. No definite acute intracranial abnormalities. [x]See my orders for details    My assessment, plan of care, findings, medications, side effects etc were discussed with:  [x]nursing []PT/OT    [x]respiratory therapy [x]Dr. Negra Culp [x]Patient     [x]Total critical care time exclusive of procedures 35 minutes with complex decision making performed and > 50% time spent in face to face evaluation.     Cesar Rehman MD

## 2020-04-30 NOTE — PROGRESS NOTES
Hospitalist Progress Note    Patient: Ana Paula Lay MRN: [id number]  CSN: [id number]    YOB: 1950  Age: 81 y.o. Sex: female    DOA: 4/27/2020 LOS:  LOS: 3 days          Chief Complaint:    AMS      Assessment/Plan     82 y/o female with h/o hypertension,  obesity presented to the ED with SOB and MO admitted for hypertensive emergency, elevated troponin, lung nodule and abnormal chest xray suggestive of PNA.      RRT and code stroke called 4/29 for AMS, possible left side weakness, and hypercapnea  Tele neuro reviewed CT brain-no acute findings  MRI could  Not be done as patient could not lie flat-continued confusion and somnolence this am-discussed with neuro for consult and asked nursing to attempt MRI again    Acute metabolic encephalopathy-rule out stroke  CT head neg-MRI brain ordered     Acute hypercapnic and hypoxic respiratory failure  High flow 02  Ammonia ok  No clear CT chest findings of severity    Hypertensive urgency  NPO due to somnolence\add hydralazine IV and hold hyzaar    Elevated troponin   Likely cardiac strain due to severely elevated blood pressure  Cardiology consult  -Dr. Curtis Schultz , echo with nl EF     probable pneumonia  Levaquin IV  Rocephin caused arm redness     Hypokalemia -repletion this am IV     Lung nodule   13 x 12 mm nodular density in the right middle lobe   Will recheck after treating for CAP     Dicussed with nursing  Neurology consult  ?  EEG  MRI brain  NPO  Keep in ICU    Diabetes, type 2-continue SSI and accuchecks Q6H      Son Rigo Adjutant (youngest son) -[phone]   oldest son, Freida Harry, [phone]  Mya Pratt -patient's sister        Disposition :  Patient Active Problem List   Diagnosis Code    Elevated troponin R79.89    Hypertensive emergency I16.1    Hypokalemia E87.6    Lung nodule R91.1    CAP (community acquired pneumonia) W55.0    Metabolic encephalopathy E69.06    Hyperglycemia due to type 2 diabetes mellitus ([facility] [address] E11.65  Acute respiratory failure with hypercapnia (HCC) J96.02       Subjective:    Still confused and somnolent  Awakens but then back to sleep, lilting on right side  No sedatives given overnight    Review of systems:    Not able to obtain      Vital signs/Intake and Output:  Visit Vitals  /72   Pulse 95   Temp 99.7 °F (37.6 °C)   Resp 20   Ht 5' 5\" (1.651 m)   Wt 125.3 kg (276 lb 3.2 oz)   SpO2 96%   BMI 45.96 kg/m²     Current Shift:  No intake/output data recorded. Last three shifts:  04/28 1901 - 04/30 0700  In: -   Out: 80 [Urine:810]    Exam:    General: eldrlry obese somnolent BF, moves a little, tries to open eyes and talk but falls back to sleep again  Head/Neck: NCAT, supple, No masses, No lymphadenopathy  CVS:Regular rate and rhythm, no M/R/G, S1/S2 heard, no thrill  Lungs:Clear to auscultation bilaterally, no wheezes, rhonchi, or rales  Abdomen: Soft, Nontender, No distention, Normal Bowel sounds, No hepatomegaly  Extremities: No C/C/E, pulses palpable 2+  Neuro:grossly normal but limited command following                Labs: Results:       Chemistry Recent Labs     04/30/20  0500 04/29/20 0930 04/29/20 0320 04/28/20  0449   * 131* 140* 118*    140 141 141   K 3.3* 3.6 3.2* 3.4*    102 102 101   CO2 33* 33* 33* 34*   BUN 27* 26* 25* 17   CREA 1.10 1.23 1.44* 0.96   CA 8.5 8.2* 8.1* 8.2*   AGAP 5 5 6 6   BUCR 25* 21* 17 18   AP 51  --  57 57   TP 6.4  --  6.4 6.4   ALB 2.9*  --  3.0* 3.0*   GLOB 3.5  --  3.4 3.4   AGRAT 0.8  --  0.9 0.9      CBC w/Diff Recent Labs     04/30/20  0500 04/29/20  0930 04/29/20  0320  04/27/20  1630   WBC 6.6 7.3 7.0   < > 7.1   RBC 5.09 5.10 5.25   < > 5.77*   HGB 11.8* 11.8* 12.2   < > 13.4   HCT 39.9 40.0 40.9   < > 44.2    346 323   < > 359   GRANS  --  74* 72  --  67   LYMPH  --  20* 22  --  28   EOS  --  0 0  --  0    < > = values in this interval not displayed.       Cardiac Enzymes Recent Labs     04/29/20  0930 04/29/20  0320   CPK 212* 186   CKND1 1.3 1.6      Coagulation Recent Labs     04/27/20  1630   PTP 12.9   INR 1.0       Lipid Panel Lab Results   Component Value Date/Time    Cholesterol, total 174 04/28/2020 04:49 AM    HDL Cholesterol 41 04/28/2020 04:49 AM    LDL, calculated 106.6 (H) 04/28/2020 04:49 AM    VLDL, calculated 26.4 04/28/2020 04:49 AM    Triglyceride 132 04/28/2020 04:49 AM    CHOL/HDL Ratio 4.2 04/28/2020 04:49 AM      BNP No results for input(s): BNPP in the last 72 hours.    Liver Enzymes Recent Labs     04/30/20  0500   TP 6.4   ALB 2.9*   AP 51   SGOT 18      Thyroid Studies Lab Results   Component Value Date/Time    TSH 1.98 04/28/2020 04:49 AM        Procedures/imaging: see electronic medical records for all procedures/Xrays and details which were not copied into this note but were reviewed prior to creation of Annmarie Schmitz MD

## 2020-04-30 NOTE — PROGRESS NOTES
Marquita Vale and report received from JASEN Magana RN. High flow O2 via NC noted bedside with patient. 1930- Completed shift assessment per flow sheet. Aphasia noted, patient is able to say yes weakly, mostly nodding appropriately to questions. [facility]    2130- MRI tech called for patient to come to MRI, stating that she would need to lay still supine for 25+ minutes. Completed test run with patient, abdominal breathing and nasal flaring noted immediately after laying patient supine. Placed patient to 45 degrees sitting position, breathing pattern regular with some noted abdominal breathing. Will hold on MRI at this time. Will communicate with medical staff in the morning. Will continue to monitor. 1- Spoke with Dr. Kevin Genao concerning MRI being held and systolic over 551 and diastolic over 819. Verbal order given for labetalol 10mg via IV now. See MAR.     0000- Completed reassessment per flow sheet. Resting with eyes closed. Will continue to monitor. 0400- Reassessment completed per flow sheet. BP increased 183/99, retake 176/110. 20 mg Labetalol given. Will continue to monitor. 0700- Report and care given to [facility].

## 2020-04-30 NOTE — CONSULTS
NEUROLOGY CONSULTATION NOTE    Patient: Madalyn Miller MRN: [id number]  [facility]: [id number]    YOB: 1950  Age: 81 y.o. Sex: female    DOA: 4/27/2020 LOS:  LOS: 3 days        Requesting Physician: Dr. Bijan Portillo  Reason for Consultation: Altered mental status/stroke? HISTORY OF PRESENT ILLNESS:   Shilpa Morales is a 73-year-old female with history of hypertension, who was admitted to hospital with shortness of breath in the setting of hypertensive encephalopathy. The patient is confused and is on BiPAP, therefore the history was obtained from the chart review and from other providers. On admission, the patient had elevated troponins and an abnormal chest x-ray suggestive of pneumonia. Currently, she is being treated for both pneumonia and hypertensive encephalopathy. Yesterday, she was seen to be acutely confused with some left-sided weakness. She was seen by teleneurology but was not regarded to be a candidate for tPA. Her teleneurology, there was no lateralized weakness but generalized weakness. The patient is currently on aspirin. PAST MEDICAL HISTORY:  Past Medical History:   Diagnosis Date    Hypertension      PAST SURGICAL HISTORY:  Past Surgical History:   Procedure Laterality Date    HX HIP REPLACEMENT      left hip     FAMILY HISTORY:  History reviewed. No pertinent family history.   SOCIAL HISTORY:  Social History     Socioeconomic History    Marital status: SINGLE     Spouse name: Not on file    Number of children: Not on file    Years of education: Not on file    Highest education level: Not on file   Tobacco Use    Smoking status: Never Smoker    Smokeless tobacco: Never Used   Substance and Sexual Activity    Alcohol use: No    Drug use: No     MEDICATIONS:  Current Facility-Administered Medications   Medication Dose Route Frequency    potassium chloride 10 mEq in 100 ml IVPB  10 mEq IntraVENous Q1H    hydrALAZINE (APRESOLINE) 20 mg/mL injection 10 mg  10 mg IntraVENous Q6H    albuterol-ipratropium (DUO-NEB) 2.5 MG-0.5 MG/3 ML  3 mL Nebulization Q4H PRN    insulin lispro (HUMALOG) injection   SubCUTAneous Q6H    glucose chewable tablet 16 g  4 Tab Oral PRN    glucagon (GLUCAGEN) injection 1 mg  1 mg IntraMUSCular PRN    dextrose 10% infusion 125-250 mL  125-250 mL IntraVENous PRN    ELECTROLYTE REPLACEMENT PROTOCOL - Potassium Renal Dosing  1 Each Other PRN    ELECTROLYTE REPLACEMENT PROTOCOL - Magnesium   1 Each Other PRN    ELECTROLYTE REPLACEMENT PROTOCOL  - Phosphorus Renal Dosing  1 Each Other PRN    levoFLOXacin (LEVAQUIN) 500 mg in D5W IVPB  500 mg IntraVENous Q24H    aspirin delayed-release tablet 81 mg  81 mg Oral DAILY    heparin (porcine) injection 5,000 Units  5,000 Units SubCUTAneous Q8H    sodium chloride (NS) flush 5-40 mL  5-40 mL IntraVENous Q8H    sodium chloride (NS) flush 5-40 mL  5-40 mL IntraVENous PRN    ondansetron (ZOFRAN) injection 4 mg  4 mg IntraVENous Q4H PRN    amLODIPine (NORVASC) tablet 10 mg  10 mg Oral DAILY    labetaloL (NORMODYNE;TRANDATE) 20 mg/4 mL (5 mg/mL) injection 20 mg  20 mg IntraVENous Q6H PRN     Prior to Admission medications    Medication Sig Start Date End Date Taking? Authorizing Provider   traMADol (ULTRAM) 50 mg tablet Take 1 Tab by mouth every six (6) hours as needed for Pain. Max Daily Amount: 200 mg. 8/1/16   JOSEFINA Yepez   [facility] [facility] [place]   Simran Mello DO   losartan 50 mg tab 100 mg, hydrochlorothiazide 25 mg tab 25 mg Take  by mouth daily. Other, MD Kennedy   amLODIPine (NORVASC) 10 mg tablet Take  by mouth daily. Laurence, MD Kennedy   meloxicam (MOBIC) 15 mg tablet Take 15 mg by mouth daily. Kennedy Dang MD       ALLERGIES:  Allergies   Allergen Reactions    Rocephin [Ceftriaxone] Other (comments)     Flushing       Review of Systems  I am unable to review the systems due to confusion. The patient denies any chest pain at this time.     PHYSICAL EXAMINATION:     Visit Vitals  /74   Pulse 91   Temp 98.7 °F (37.1 °C)   Resp 16   Ht 5' 5\" (1.651 m)   Wt 125.3 kg (276 lb 3.2 oz)   SpO2 98%   BMI 45.96 kg/m²    O2 Flow Rate (L/min): 40 l/min O2 Device: BIPAP  GENERAL: Somnolent, On BiPAP. HEENT: Moist mucous membranes, sclerae anicteric, scalp is atraumatic. CVS: Regular rate and rhythm, no murmurs or gallops. No carotid bruits. PULMONARY: Clear to auscultation Anteriorly. EXTREMITIES: Normal range of motion at all sites. No deformities. ABDOMEN: Soft, nontender. SKIN: No rashes or ecchymoses. Warm and dry. NEUROLOGIC: Somnolent but arousable. She doesn't answer the questions. She is not following all the commands but only some of them. She needs redirection. Cranial nerves: Face is symmetric with symmetric smile. Facial sensation is intact. Extraocular movements are Limited in all directions. It is difficult to assess visual fields. Motor: Normal tone and normal bulk on all four extremities. She Has generalized weakness, more on the left side. Her handgrip is weaker on the left compared to the right. There seems to be asterixis But difficult to discern. She has difficulty lifting the left leg but able to move the like to noxious stimuli. .  Sensory: She with roaster noxious stimuli on all 4 extremities. Coordination: Difficult to assess coordination. There are no abnormal movements. Deep tendon reflexes: 2+ at biceps, brachioradialis, patella and absent at ankles bilaterally. Toes are down-going bilaterally. Gait assessment: Deferred.     Labs: Results:       Chemistry Recent Labs     04/30/20  0500 04/29/20  0930 04/29/20  0320 04/28/20  0449   * 131* 140* 118*    140 141 141   K 3.3* 3.6 3.2* 3.4*    102 102 101   CO2 33* 33* 33* 34*   BUN 27* 26* 25* 17   CREA 1.10 1.23 1.44* 0.96   CA 8.5 8.2* 8.1* 8.2*   AGAP 5 5 6 6   BUCR 25* 21* 17 18   AP 51  --  57 57   TP 6.4  --  6.4 6.4   ALB 2.9*  --  3.0* 3.0*   GLOB 3.5  -- 3.4 3.4   AGRAT 0.8  --  0.9 0.9      CBC w/Diff Recent Labs     04/30/20  0500 04/29/20  0930 04/29/20  0320  04/27/20  1630   WBC 6.6 7.3 7.0   < > 7.1   RBC 5.09 5.10 5.25   < > 5.77*   HGB 11.8* 11.8* 12.2   < > 13.4   HCT 39.9 40.0 40.9   < > 44.2    346 323   < > 359   GRANS  --  74* 72  --  67   LYMPH  --  20* 22  --  28   EOS  --  0 0  --  0    < > = values in this interval not displayed. Cardiac Enzymes Recent Labs     04/29/20  0930 04/29/20  0320   * 186   CKND1 1.3 1.6      Coagulation Recent Labs     04/27/20  1630   PTP 12.9   INR 1.0       Lipid Panel Lab Results   Component Value Date/Time    Cholesterol, total 174 04/28/2020 04:49 AM    HDL Cholesterol 41 04/28/2020 04:49 AM    LDL, calculated 106.6 (H) 04/28/2020 04:49 AM    VLDL, calculated 26.4 04/28/2020 04:49 AM    Triglyceride 132 04/28/2020 04:49 AM    CHOL/HDL Ratio 4.2 04/28/2020 04:49 AM      BNP No results for input(s): BNPP in the last 72 hours. Liver Enzymes Recent Labs     04/30/20  0500   TP 6.4   ALB 2.9*   AP 51   SGOT 18      Thyroid Studies Lab Results   Component Value Date/Time    TSH 1.98 04/28/2020 04:49 AM          Radiology:  Ct Head Wo Cont    Result Date: 4/29/2020  EXAM: CT HEAD WO CONT CLINICAL INDICATION/HISTORY: change in mental status COMPARISON: April 29, 2020 TECHNIQUE: Axial CT imaging of the head was performed without intravenous contrast. Sagittal and coronal reconstructions are provided. One or more dose reduction techniques were used on this CT: automated exposure control, adjustment of the mAs and/or kVp according to patient size, and iterative reconstruction techniques. The specific techniques used on this CT exam have been documented in the patient's electronic medical record.  Digital Imaging and Communications in Medicine (DICOM) format image data are available to nonaffiliated external healthcare facilities or entities on a secure, media free, reciprocally searchable basis with patient authorization for at least a 12-month period after this study _______________ FINDINGS: Image quality degraded by moderate to severe motion artifact. BRAIN AND POSTERIOR FOSSA: The sulci, folia, ventricles and basal cisterns are within normal limits for the patient's age. There is no intracranial hemorrhage, mass effect, or midline shift. There are no areas of abnormal parenchymal attenuation. EXTRA-AXIAL SPACES AND MENINGES: There are no abnormal extra-axial fluid collections. CALVARIUM: Intact. SINUSES: Clear. OTHER: None. _______________     IMPRESSION: Limited study secondary to motion. No definite acute intracranial abnormalities. Ct Head Wo Cont    Result Date: 4/29/2020  EXAM: CT head INDICATION: Hypertension. Pain. COMPARISON: April 27, 2020. TECHNIQUE: Axial CT imaging of the head was performed without intravenous contrast. Dose reduction techniques used: automated exposure control, adjustment of the mAs and/or kVp according to patient size, and iterative reconstruction techniques. Digital imaging and communications in Medicine (DICOM) format image data are available to nonaffiliated external healthcare facilities or entities on a secure, media free, reciprocally searchable basis with patient authorization for at least 12 months after this study. _______________ FINDINGS: BRAIN AND POSTERIOR FOSSA: The sulci, folia, ventricles and basal cisterns are within normal limits for the patient's age. There is no intracranial hemorrhage, mass effect, or midline shift. There are no areas of abnormal parenchymal attenuation. EXTRA-AXIAL SPACES AND MENINGES: There are no abnormal extra-axial fluid collections. CALVARIUM: Intact. SINUSES: Clear. OTHER: None. _______________     IMPRESSION: No acute intracranial abnormalities.     Ct Head Wo Cont    Result Date: 4/27/2020  EXAM: CT head INDICATION: Hypertension emergency COMPARISON: January 28, 2014 TECHNIQUE: Axial CT imaging of the head was performed without intravenous contrast. One or more dose reduction techniques were used on this CT: automated exposure control, adjustment of the mAs and/or kVp according to patient size, and iterative reconstruction techniques. The specific techniques used on this CT exam have been documented in the patient's electronic medical record. Digital Imaging and Communications in Medicine (DICOM) format image data are available to nonaffiliated external healthcare facilities or entities on a secure, media free, reciprocally searchable basis with patient authorization for at least a 12-month period after this study. _______________ FINDINGS: BRAIN AND POSTERIOR FOSSA: The sulci, folia, ventricles and basal cisterns are within normal limits for the patient's age. There is no intracranial hemorrhage, mass effect, or midline shift. There are no areas of abnormal parenchymal attenuation. EXTRA-AXIAL SPACES AND MENINGES: There are no abnormal extra-axial fluid collections. CALVARIUM: Intact. SINUSES: Clear. OTHER: None. _______________     IMPRESSION: No acute intracranial abnormalities. Ct Chest Wo Cont    Result Date: 4/27/2020  EXAM: CT chest INDICATION: Shortness of breath COMPARISON: August 4, 2019 TECHNIQUE: Axial CT imaging from the thoracic inlet through the diaphragm without intravenous contrast. Multiplanar reformats were generated. One or more dose reduction techniques were used on this CT: automated exposure control, adjustment of the mAs and/or kVp according to patient size, and iterative reconstruction techniques. The specific techniques used on this CT exam have been documented in the patient's electronic medical record. Digital Imaging and Communications in Medicine (DICOM) format image data are available to nonaffiliated external healthcare facilities or entities on a secure, media free, reciprocally searchable basis with patient authorization for at least a 12-month period after this study.  _______________ FINDINGS: THYROID GLAND: There is an enlarged left thyroid lobe with substernal extension measuring 3.8 x 3.3 cm. LYMPH NODES: No enlarged lymph nodes seen. PLEURA: There are no pleural effusion. HEART: Normal without pericardial effusion. Moderate calcific coronary artery disease present. VASCULATURE/MEDIASTINUM: Mild to moderate calcific atherosclerosis present. There is a small hiatal hernia. LUNGS: There is right lower lobe atelectasis and/or infiltrate. There is a 13 x 12 mm nodular density in the right middle lobe which may represent round atelectasis or pneumonia however only nodule not excluded. AIRWAY: Normal. UPPER ABDOMEN: There is a right renal cyst measuring 2.4 cm long the midpole. Otherwise the visualized solid organs are unremarkable. Post gastric bypass changes present. OTHER: No acute or aggressive osseous abnormalities identified. Elevated right hemidiaphragm. _______________     IMPRESSION: There is right lower lobe atelectasis and/or infiltrate. Elevated right hemidiaphragm. Enlarged left thyroid lobe with possible underlying nodule substernal extension. Advise thyroid ultrasound for further evaluation on a nonemergent basis. There is a 13 x 12 mm nodular density in the right middle lobe which may represent round atelectasis or pneumonia however underlying pulmonary nodule is not excluded. Follow-up as per Fleischner Society protocol. ======== Fleischner Society Pulmonary Nodule Guidelines (revised 2017): Solid nodule >8 mm: Consider CT, PET CT, or tissue sampling at 3 months. Cta Chest W Or W Wo Cont    Result Date: 4/29/2020  EXAM: CTA CHEST W OR W WO CONT CLINICAL INDICATION/HISTORY: Respiratory Distress COMPARISON: Correlation with CT the chest April 27, 2020 TECHNIQUE: Axial CT imaging from the thoracic inlet through the diaphragm with intravenous contrast. Coronal and sagittal MIP reformats were generated.   One or more dose reduction techniques were used on this CT: automated exposure control, adjustment of the mAs and/or kVp according to patient size, and iterative reconstruction techniques. The specific techniques used on this CT exam have been documented in the patient's electronic medical record. Digital Imaging and Communications in Medicine (DICOM) format image data are available to nonaffiliated external healthcare facilities or entities on a secure, media free, reciprocally searchable basis with patient authorization for at least a 12-month period after this study. _______________ FINDINGS: EXAM QUALITY: Adequate for diagnosis. However, moderate artifacts degrade image quality particular the lung bases. Satisfactory enhancement of the pulmonary arterial vasculature. PULMONARY ARTERIES: No evidence of pulmonary embolism. MEDIASTINUM: Normal heart size. No evidence of right heart strain. Aorta is unremarkable. No pericardial effusion. Left thyroid soft tissue mass measures LUNGS: Progressive volume loss and atelectasis noted in the right lung base. Mild hazy perihilar groundglass opacities may related to motion and atelectasis versus mild edema. PLEURA: Normal. AIRWAY: Normal. LYMPH NODES: No enlarged nodes. UPPER ABDOMEN: Small hiatal hernia. OTHER: No acute or aggressive osseous abnormalities identified. Degenerative changes noted in both shoulders. Multilevel thoracic spondylosis. _______________     IMPRESSION: 1. Limited study due to motion however, no convincing evidence of pulmonary embolism. 2. Progressive right basilar volume loss and atelectasis. 3. Suspect mild interstitial edema. Xr Chest Port    Result Date: 4/29/2020  EXAM: XR CHEST PORT CLINICAL INDICATION/HISTORY: AMS -Additional: None COMPARISON: 4/27/2020 TECHNIQUE: Portable frontal view of the chest _______________ FINDINGS: SUPPORT DEVICES: None. HEART AND MEDIASTINUM: Stable cardiomediastinal silhouette. LUNGS AND PLEURAL SPACES: Elevated right hemidiaphragm. Mildly prominent interstitial markings versus hypoinflation.  No dense consolidation, large effusion or pneumothorax. _______________     IMPRESSION: Elevated right hemidiaphragm. Mildly prominent interstitial markings versus hypoinflation. Xr Chest Port    Result Date: 4/27/2020  EXAM:  AP Portable Chest X-ray 1 view INDICATION: Shortness of breath COMPARISON: August 2, 2009 _______________ FINDINGS: There is shallow inspiration. Heart size appears enlarged likely due to AP magnification. There is an elevated right hemidiaphragm. There are increased interstitial lung markings which may be secondary to shallow inspiration or mild pulmonary edema. There is suggestion of right lung base atelectasis. There are no pleural effusions. No acute osseous findings. ________________      IMPRESSION: Increased interstitial lung markings which may be secondary to shallow inspiration or mild pulmonary edema. Right lung base atelectasis. ASSESSMENT/IMPRESSION:  The clinical presentation is suggestive of toxic/metabolic encephalopathy. There is high possibility of cerebrovascular accident as well, given the clinical situation with subtle left-sided weakness. Weakness may be general but it is difficult to discern. When the patient is able to tolerate, she needs to be evaluated with a brain MRI and CTA. RECOMMENDATIONS:  1. Continue Aspirin 81 mg daily. I will also start atorvastatin. 2. Continue telemetry monitoring  3. Neuro checks per stroke protocol  4. Permissive hypertension (do not treat if BP is not >200/110, prefer prn labetolol 5mg)  5. IV normal saline continuous infusion 100-125 ml/h  6. MRI without contrast  7. CTA of head and neck when able. 8. SCDs and DVT prophylaxis   9. Treatment of pneumonia and respiratory insufficiency. 10. PT/OT and SLP consults    We will follow the patient.  Please do not hesitate to return with any questions.    ------------------------------------  Rodrigo Urban MD  4/30/2020  1:16 PM

## 2020-04-30 NOTE — PROGRESS NOTES
Cardiology Progress Note        Patient: Juan Ramon Cortez Minor        Sex: female          DOA: 4/27/2020  YOB: 1950      Age:  81 y.o.        LOS:  LOS: 2 days    Patient seen and examined, chart reviewed. Assessment/Plan     Patient Active Problem List   Diagnosis Code    Elevated troponin R79.89    Hypertensive emergency I16.1    Hypokalemia E87.6    Lung nodule R91.1    CAP (community acquired pneumonia) J18.9      Acute hypercapnic and hypoxic respiratory failure  Altered mental status  Shortness of breath on exertion   Abnormal troponin: No clear evidence of ACS, due to demand ischemia   Abnormal EKG        Echocardiogram revealed Left Ventricle: Normal cavity size, wall thickness and systolic function (ejection fraction normal). The estimated ejection fraction is 55 - 60%. There is mild (grade 1) left ventricular diastolic dysfunction E'A ratio= 0.90. Pulmonary Artery: Pulmonary arteries not well visualized. Pulmonary arterial systolic pressure (PASP) is 31 mmHg. Pulmonary hypertension found to be mild.     Plan:    Continue Aspirin 81 mg once a day, Losartan/HCTZ and Amlodipine if patient can take oral medication otherwise give IV Labetalol for hypertesion. Continue management as per hospital medicine  Plan discussed with Estuardo Im and patient's nurse. Subjective:    cc:  Patient developed altered mental status and code stroke was called. She was transferred to ICU for further management. REVIEW OF SYSTEMS:     Can not obtain     Objective:      Visit Vitals  /89   Pulse 100   Temp 99.9 °F (37.7 °C)   Resp 22   Ht 5' 5\" (1.651 m)   Wt 125.3 kg (276 lb 3.2 oz)   SpO2 99%   BMI 45.96 kg/m²     Body mass index is 45.96 kg/m². Physical Exam:  General Appearance: Comfortable, In mild respiratory distress  HEENT: ROSENDO. HEAD: Atraumatic  NECK: No JVD, no thyroidomeglay. CAROTIDS: No bruit  LUNGS: Clear bilaterally.    HEART: S1+S2 audible, no murmur, no pericardial rub.      ABD: Non-tender, BS Audible    NEUROLOGICAL: Confused, not following verbal commands  Medication:  Current Facility-Administered Medications   Medication Dose Route Frequency    albuterol-ipratropium (DUO-NEB) 2.5 MG-0.5 MG/3 ML  3 mL Nebulization Q4H PRN    insulin lispro (HUMALOG) injection   SubCUTAneous Q6H    glucose chewable tablet 16 g  4 Tab Oral PRN    glucagon (GLUCAGEN) injection 1 mg  1 mg IntraMUSCular PRN    dextrose 10% infusion 125-250 mL  125-250 mL IntraVENous PRN    ELECTROLYTE REPLACEMENT PROTOCOL - Potassium Renal Dosing  1 Each Other PRN    ELECTROLYTE REPLACEMENT PROTOCOL - Magnesium   1 Each Other PRN    ELECTROLYTE REPLACEMENT PROTOCOL  - Phosphorus Renal Dosing  1 Each Other PRN    labetaloL (NORMODYNE;TRANDATE) 20 mg/4 mL (5 mg/mL) injection 10 mg  10 mg IntraVENous ONCE    levoFLOXacin (LEVAQUIN) 500 mg in D5W IVPB  500 mg IntraVENous Q24H    aspirin delayed-release tablet 81 mg  81 mg Oral DAILY    heparin (porcine) injection 5,000 Units  5,000 Units SubCUTAneous Q8H    sodium chloride (NS) flush 5-40 mL  5-40 mL IntraVENous Q8H    sodium chloride (NS) flush 5-40 mL  5-40 mL IntraVENous PRN    ondansetron (ZOFRAN) injection 4 mg  4 mg IntraVENous Q4H PRN    losartan/hydroCHLOROthiazide (HYZAAR) 100/25 mg   Oral DAILY    amLODIPine (NORVASC) tablet 10 mg  10 mg Oral DAILY    labetaloL (NORMODYNE;TRANDATE) 20 mg/4 mL (5 mg/mL) injection 20 mg  20 mg IntraVENous Q6H PRN               Lab/Data Reviewed:       Recent Labs     04/29/20  0930 04/29/20  0320 04/28/20  0449   WBC 7.3 7.0 9.5   HGB 11.8* 12.2 12.6   HCT 40.0 40.9 41.7    323 370     Recent Labs     04/29/20  0930 04/29/20 0320 04/28/20  0449    141 141   K 3.6 3.2* 3.4*    102 101   CO2 33* 33* 34*   * 140* 118*   BUN 26* 25* 17   CREA 1.23 1.44* 0.96   CA 8.2* 8.1* 8.2*     35 minutes of critical care time spent in the direct evaluation and treatment of this high risk patient. The reason for providing this level of medical care for this critically ill patient was due a critical illness that impaired one or more vital organ systems such that there was a high probability of imminent or life threatening deterioration in the patients condition. This care involved high complexity decision making to assess, manipulate, and support vital system functions, to treat this degree vital organ system failure and to prevent further life threatening deterioration of the patients condition.   Signed By: Sharri Joseph MD     April 29, 2020

## 2020-04-30 NOTE — DIABETES MGMT
GLYCEMIC CONTROL PROGRESS NOTE:      - chart reviewed, no known h/o DM, HbA1C meets criteria for DM2  - - Humalog Normal Insulin Sensitivity Corrective Coverage ordered recommend continue    Recent Glucose Results:   Lab Results   Component Value Date/Time     (H) 04/30/2020 05:00 AM    GLUCPOC 128 (H) 04/30/2020 05:13 AM    GLUCPOC 118 (H) 04/29/2020 11:58 PM    GLUCPOC 113 (H) 04/29/2020 05:35 PM     Lab Results   Component Value Date/Time    Hemoglobin A1c 7.1 (H) 04/28/2020 02:28 PM    Hemoglobin A1c 7.0 (H) 04/28/2020 04:49 AM    Hemoglobin A1c 7.0 (H) 01/03/2014 10:32 PM       Tiffany Busby MS, RN, CDE  Glycemic Control Team  [phone]  Pager [phone] (M-TH 8:00-4:30P)  *After Hours pager [phone]

## 2020-04-30 NOTE — PROGRESS NOTES
Discussed with Dr. Spencer Netter - order for Bipap 12/7 and 26%. VTs 376 and sarts at 98%. cristhian well. 4 hours on and QHS.

## 2020-05-01 ENCOUNTER — APPOINTMENT (OUTPATIENT)
Dept: GENERAL RADIOLOGY | Age: 70
DRG: 304 | End: 2020-05-01
Attending: INTERNAL MEDICINE
Payer: MEDICARE

## 2020-05-01 ENCOUNTER — APPOINTMENT (OUTPATIENT)
Dept: MRI IMAGING | Age: 70
DRG: 304 | End: 2020-05-01
Attending: PSYCHIATRY & NEUROLOGY
Payer: MEDICARE

## 2020-05-01 LAB
ALBUMIN SERPL-MCNC: 2.8 G/DL (ref 3.4–5)
ALBUMIN/GLOB SERPL: 0.8 {RATIO} (ref 0.8–1.7)
ALP SERPL-CCNC: 48 U/L (ref 45–117)
ALT SERPL-CCNC: 19 U/L (ref 13–56)
ANION GAP SERPL CALC-SCNC: 4 MMOL/L (ref 3–18)
ARTERIAL PATENCY WRIST A: ABNORMAL
AST SERPL-CCNC: 19 U/L (ref 10–38)
BASE EXCESS BLD CALC-SCNC: 9 MMOL/L
BDY SITE: ABNORMAL
BILIRUB SERPL-MCNC: 0.6 MG/DL (ref 0.2–1)
BODY TEMPERATURE: 98.7
BUN SERPL-MCNC: 27 MG/DL (ref 7–18)
BUN/CREAT SERPL: 26 (ref 12–20)
CALCIUM SERPL-MCNC: 8.7 MG/DL (ref 8.5–10.1)
CHLORIDE SERPL-SCNC: 107 MMOL/L (ref 100–111)
CO2 SERPL-SCNC: 33 MMOL/L (ref 21–32)
CREAT SERPL-MCNC: 1.02 MG/DL (ref 0.6–1.3)
ERYTHROCYTE [DISTWIDTH] IN BLOOD BY AUTOMATED COUNT: 15.9 % (ref 11.6–14.5)
GAS FLOW.O2 O2 DELIVERY SYS: ABNORMAL L/MIN
GLOBULIN SER CALC-MCNC: 3.4 G/DL (ref 2–4)
GLUCOSE BLD STRIP.AUTO-MCNC: 101 MG/DL (ref 70–110)
GLUCOSE BLD STRIP.AUTO-MCNC: 102 MG/DL (ref 70–110)
GLUCOSE BLD STRIP.AUTO-MCNC: 102 MG/DL (ref 70–110)
GLUCOSE BLD STRIP.AUTO-MCNC: 96 MG/DL (ref 70–110)
GLUCOSE SERPL-MCNC: 110 MG/DL (ref 74–99)
HCO3 BLD-SCNC: 34.5 MMOL/L (ref 22–26)
HCT VFR BLD AUTO: 39.7 % (ref 35–45)
HGB BLD-MCNC: 11.6 G/DL (ref 12–16)
MAGNESIUM SERPL-MCNC: 2.4 MG/DL (ref 1.6–2.6)
MCH RBC QN AUTO: 22.9 PG (ref 24–34)
MCHC RBC AUTO-ENTMCNC: 29.2 G/DL (ref 31–37)
MCV RBC AUTO: 78.5 FL (ref 74–97)
O2/TOTAL GAS SETTING VFR VENT: 0.26 %
PCO2 BLD: 60.8 MMHG (ref 35–45)
PEEP RESPIRATORY: 7 CMH2O
PH BLD: 7.36 [PH] (ref 7.35–7.45)
PHOSPHATE SERPL-MCNC: 2.9 MG/DL (ref 2.5–4.9)
PIP ISTAT,IPIP: 12
PLATELET # BLD AUTO: 321 K/UL (ref 135–420)
PMV BLD AUTO: 10 FL (ref 9.2–11.8)
PO2 BLD: 79 MMHG (ref 80–100)
POTASSIUM SERPL-SCNC: 3.6 MMOL/L (ref 3.5–5.5)
POTASSIUM SERPL-SCNC: 3.7 MMOL/L (ref 3.5–5.5)
PRESSURE SUPPORT SETTING VENT: 5 CMH2O
PROT SERPL-MCNC: 6.2 G/DL (ref 6.4–8.2)
RBC # BLD AUTO: 5.06 M/UL (ref 4.2–5.3)
SAO2 % BLD: 95 % (ref 92–97)
SERVICE CMNT-IMP: ABNORMAL
SODIUM SERPL-SCNC: 144 MMOL/L (ref 136–145)
SPECIMEN TYPE: ABNORMAL
SPONTANEOUS TIMED, IST: YES
TOTAL RESP. RATE, ITRR: 22
WBC # BLD AUTO: 6.6 K/UL (ref 4.6–13.2)

## 2020-05-01 PROCEDURE — 94660 CPAP INITIATION&MGMT: CPT

## 2020-05-01 PROCEDURE — 70551 MRI BRAIN STEM W/O DYE: CPT

## 2020-05-01 PROCEDURE — 36415 COLL VENOUS BLD VENIPUNCTURE: CPT

## 2020-05-01 PROCEDURE — 84132 ASSAY OF SERUM POTASSIUM: CPT

## 2020-05-01 PROCEDURE — 74011250636 HC RX REV CODE- 250/636: Performed by: INTERNAL MEDICINE

## 2020-05-01 PROCEDURE — 92526 ORAL FUNCTION THERAPY: CPT

## 2020-05-01 PROCEDURE — 74011250636 HC RX REV CODE- 250/636: Performed by: FAMILY MEDICINE

## 2020-05-01 PROCEDURE — 77010033711 HC HIGH FLOW OXYGEN

## 2020-05-01 PROCEDURE — 71045 X-RAY EXAM CHEST 1 VIEW: CPT

## 2020-05-01 PROCEDURE — 82803 BLOOD GASES ANY COMBINATION: CPT

## 2020-05-01 PROCEDURE — 65610000006 HC RM INTENSIVE CARE

## 2020-05-01 PROCEDURE — 85027 COMPLETE CBC AUTOMATED: CPT

## 2020-05-01 PROCEDURE — 83735 ASSAY OF MAGNESIUM: CPT

## 2020-05-01 PROCEDURE — 92610 EVALUATE SWALLOWING FUNCTION: CPT

## 2020-05-01 PROCEDURE — 82962 GLUCOSE BLOOD TEST: CPT

## 2020-05-01 PROCEDURE — 84100 ASSAY OF PHOSPHORUS: CPT

## 2020-05-01 PROCEDURE — 36600 WITHDRAWAL OF ARTERIAL BLOOD: CPT

## 2020-05-01 RX ORDER — POTASSIUM CHLORIDE 7.45 MG/ML
10 INJECTION INTRAVENOUS ONCE
Status: COMPLETED | OUTPATIENT
Start: 2020-05-01 | End: 2020-05-01

## 2020-05-01 RX ORDER — LABETALOL HCL 20 MG/4 ML
20 SYRINGE (ML) INTRAVENOUS
Status: DISCONTINUED | OUTPATIENT
Start: 2020-05-01 | End: 2020-05-08 | Stop reason: HOSPADM

## 2020-05-01 RX ORDER — METOPROLOL TARTRATE 5 MG/5ML
2.5 INJECTION INTRAVENOUS EVERY 6 HOURS
Status: DISCONTINUED | OUTPATIENT
Start: 2020-05-01 | End: 2020-05-01

## 2020-05-01 RX ORDER — POTASSIUM CHLORIDE 7.45 MG/ML
10 INJECTION INTRAVENOUS
Status: COMPLETED | OUTPATIENT
Start: 2020-05-01 | End: 2020-05-01

## 2020-05-01 RX ADMIN — HEPARIN SODIUM 5000 UNITS: 5000 INJECTION INTRAVENOUS; SUBCUTANEOUS at 23:35

## 2020-05-01 RX ADMIN — Medication 10 ML: at 06:03

## 2020-05-01 RX ADMIN — HEPARIN SODIUM 5000 UNITS: 5000 INJECTION INTRAVENOUS; SUBCUTANEOUS at 15:18

## 2020-05-01 RX ADMIN — POTASSIUM CHLORIDE 10 MEQ: 7.46 INJECTION, SOLUTION INTRAVENOUS at 15:17

## 2020-05-01 RX ADMIN — Medication 10 ML: at 22:05

## 2020-05-01 RX ADMIN — LABETALOL 20 MG/4 ML (5 MG/ML) INTRAVENOUS SYRINGE 20 MG: at 22:05

## 2020-05-01 RX ADMIN — POTASSIUM CHLORIDE 10 MEQ: 7.46 INJECTION, SOLUTION INTRAVENOUS at 06:03

## 2020-05-01 RX ADMIN — HEPARIN SODIUM 5000 UNITS: 5000 INJECTION INTRAVENOUS; SUBCUTANEOUS at 08:26

## 2020-05-01 RX ADMIN — POTASSIUM CHLORIDE 10 MEQ: 7.46 INJECTION, SOLUTION INTRAVENOUS at 07:04

## 2020-05-01 RX ADMIN — LEVOFLOXACIN 500 MG: 5 INJECTION, SOLUTION INTRAVENOUS at 08:26

## 2020-05-01 RX ADMIN — Medication 10 ML: at 15:17

## 2020-05-01 RX ADMIN — LABETALOL 20 MG/4 ML (5 MG/ML) INTRAVENOUS SYRINGE 10 MG: at 16:36

## 2020-05-01 NOTE — PROGRESS NOTES
Cardiology Progress Note        Patient: Hilda Wellington Minor        Sex: female          DOA: 4/27/2020  YOB: 1950      Age:  81 y.o.        LOS:  LOS: 3 days    Patient seen and examined, chart reviewed. Assessment/Plan     Patient Active Problem List   Diagnosis Code    Elevated troponin R79.89    Hypertensive emergency I16.1    Hypokalemia E87.6    Lung nodule R91.1    CAP (community acquired pneumonia) Y20.4    Metabolic encephalopathy Y76.11    Hyperglycemia due to type 2 diabetes mellitus ([facility] [address] E11.65    Acute respiratory failure with hypercapnia (HCC) J96.02      Acute hypercapnic and hypoxic respiratory failure  Altered mental status  Shortness of breath on exertion   Abnormal troponin: No clear evidence of ACS, due to demand ischemia   Abnormal EKG        Echocardiogram revealed Left Ventricle: Normal cavity size, wall thickness and systolic function (ejection fraction normal). The estimated ejection fraction is 55 - 60%. There is mild (grade 1) left ventricular diastolic dysfunction E'A ratio= 0.90. Pulmonary Artery: Pulmonary arteries not well visualized. Pulmonary arterial systolic pressure (PASP) is 31 mmHg. Pulmonary hypertension found to be mild.     Plan:    Continue Aspirin 81 mg once a day,   IV Labetalol 10 mg q 6 hr PRN for hypertension as per neurologist.  Continue management as per hospital medicine  Plan discussed with Roberta Dubois and patient's nurse. Plan discussed with                Subjective:    cc:  Confused       REVIEW OF SYSTEMS:     Can not obtain     Objective:      Visit Vitals  BP (!) 200/117   Pulse 93   Temp 99.1 °F (37.3 °C)   Resp 24   Ht 5' 5\" (1.651 m)   Wt 125.3 kg (276 lb 3.2 oz)   SpO2 95%   BMI 45.96 kg/m²     Body mass index is 45.96 kg/m². Physical Exam:  General Appearance: Comfortable, In mild respiratory distress  HEENT: ROSENDO. HEAD: Atraumatic  NECK: No JVD, no thyroidomeglay.  CAROTIDS: No bruit  LUNGS: Clear bilaterally. HEART: S1+S2 audible, no murmur, no pericardial rub.      ABD: Non-tender, BS Audible    NEUROLOGICAL: Confused, not following verbal commands  Medication:  Current Facility-Administered Medications   Medication Dose Route Frequency    [START ON 5/1/2020] atorvastatin (LIPITOR) tablet 20 mg  20 mg Oral DAILY    labetaloL (NORMODYNE;TRANDATE) 20 mg/4 mL (5 mg/mL) injection 10 mg  10 mg IntraVENous Q6H PRN    potassium chloride 10 mEq in 100 ml IVPB  10 mEq IntraVENous Q1H    albuterol-ipratropium (DUO-NEB) 2.5 MG-0.5 MG/3 ML  3 mL Nebulization Q4H PRN    insulin lispro (HUMALOG) injection   SubCUTAneous Q6H    glucose chewable tablet 16 g  4 Tab Oral PRN    glucagon (GLUCAGEN) injection 1 mg  1 mg IntraMUSCular PRN    dextrose 10% infusion 125-250 mL  125-250 mL IntraVENous PRN    ELECTROLYTE REPLACEMENT PROTOCOL - Potassium Renal Dosing  1 Each Other PRN    ELECTROLYTE REPLACEMENT PROTOCOL - Magnesium   1 Each Other PRN    ELECTROLYTE REPLACEMENT PROTOCOL  - Phosphorus Renal Dosing  1 Each Other PRN    levoFLOXacin (LEVAQUIN) 500 mg in D5W IVPB  500 mg IntraVENous Q24H    aspirin delayed-release tablet 81 mg  81 mg Oral DAILY    heparin (porcine) injection 5,000 Units  5,000 Units SubCUTAneous Q8H    sodium chloride (NS) flush 5-40 mL  5-40 mL IntraVENous Q8H    sodium chloride (NS) flush 5-40 mL  5-40 mL IntraVENous PRN    ondansetron (ZOFRAN) injection 4 mg  4 mg IntraVENous Q4H PRN               Lab/Data Reviewed:       Recent Labs     04/30/20  0500 04/29/20  0930 04/29/20  0320   WBC 6.6 7.3 7.0   HGB 11.8* 11.8* 12.2   HCT 39.9 40.0 40.9    346 323     Recent Labs     04/30/20  1902 04/30/20  0500 04/29/20  0930 04/29/20  0320   NA  --  142 140 141   K 3.5 3.3* 3.6 3.2*   CL  --  104 102 102   CO2  --  33* 33* 33*   GLU  --  132* 131* 140*   BUN  --  27* 26* 25*   CREA  --  1.10 1.23 1.44*   CA  --  8.5 8.2* 8.1*     40 minutes of critical care time spent in the direct evaluation and treatment of this high risk patient. The reason for providing this level of medical care for this critically ill patient was due a critical illness that impaired one or more vital organ systems such that there was a high probability of imminent or life threatening deterioration in the patients condition. This care involved high complexity decision making to assess, manipulate, and support vital system functions, to treat this degree vital organ system failure and to prevent further life threatening deterioration of the patients condition.   Signed By: Shilpa More MD     April 30, 2020

## 2020-05-01 NOTE — PROGRESS NOTES
Chart reviewed pt remains in ICU on high flow 02,at this time cm will cont to review case for safe d/c planning, when medically stable recommend PT,Ot eval to assist with d/c planning, weekend cm will be available for cm immediate needs thru [facility]  if needed.

## 2020-05-01 NOTE — PROGRESS NOTES
1913-Bedside verbal report given to Gabbi Gallardo RN. Sbar, mar, labs,kardex and patient status reviewed. Assumed care of patient. 2000-Assessment completed. No changes from off-going report. 2330-Pt is more tachyphenic at this time and slightly restless in bed. RT in room to place bipap.    0000-After Bipap placed, patient appears more comfortable. RR even and unlabored. Assessment completed. 0400-Assessment completed no changes from previous assessment. 0700-Pt pulled off bipap at this time. Placed back on HFNC on previous settings.

## 2020-05-01 NOTE — PROGRESS NOTES
Problem: Dysphagia (Adult)  Goal: *Acute Goals and Plan of Care (Insert Text)  Description: As discussed per Dr. Bey Forward, patient to remain NPO this day, with diet as recommended added tomorrow (5/2/20) as appropriate. Patient presents with mild-moderate oropharyngeal dysphagia c/b mild increased oral prep phase, decreased bolus formation and min oral lateral sulci pocketing noted pm the left with mixed and solids; minimally delayed/reduced hyolaryngeal excursion upon palpation. However, patient able to safely and efficiently manipulate and clear puree textures with intermittent nectar thick liquid wash with minimal overt s/sx aspiration appreciated with dry and delayed cough. Patient is at risk for aspiration, and strict aspiration precautions should be utilized. Patient will require assistance with meals for food prep, feeding and to ensure patient safety with consistencies (all pocketing remediated with nectar thick liquid wash and oral care after po before staff leaves room). Recommend puree diet, nectar thick, with meds crushed in applesauce. Nurse, Carlos A Noriega, educated on aspiration risk and use of compensatory swallowing strategies including small bites/sips, reduced rate of intake, alternating bites/sips as well as diet recs, including consistent supervision and assistance with all po. Rec:   Cautious puree diet with nectar thick liquids, straws ok  Strict aspiration/reflux precautions; pt must be fed w/ HOB >30, remain >30 for 30-45 minutes after po   Assistance and supervision with all meals  Small bites/sips; alternate liquid/solid with slow feeding rate   Oral care after po  Meds crushed in applesauce    Patient will:  Utilize aspiration/reflux precautions, compensatory eating/swallow strategies, diet modifications and oral care recommendations with minimal A (visual, verbal, tactile cues) in 4/5 trials.    Tolerate least restrictive diet using above without overt s/sx aspiration/distress in 4/5 trials with min A (visual, verbal, tactile cues). Participate in MBS to further evaluate swallow function and assess s/sx of aspiration as indicated and ordered per MD.   Perform compensatory swallow maneuvers and exercise to increase swallow function/safety as indicated with min A (visual, verbal, tactile cues). Outcome: Progressing Towards Goal   SPEECH-LANGUAGE PATHOLOGY BEDSIDE SWALLOW EVALUATION AND THERAPY  Patient: Hilda Morales (80 y.o. female)  Date: 5/1/2020  Primary Diagnosis: Hypertensive emergency [I16.1]  Elevated troponin [R79.89]      Precautions: Aspiration/falls    ASSESSMENT :  As above. Patient will benefit from skilled intervention to address the above impairments. Patients rehabilitation potential is considered to be Fair  Factors which may influence rehabilitation potential include:   []            None noted  [x]            Mental ability/status  [x]            Medical condition  [x]            Home/family situation and support systems  [x]            Safety awareness  []            Pain tolerance/management  []            Other:        PLAN :  Recommendations and Planned Interventions:  As above. Frequency/Duration: Patient will be followed by speech-language pathology 3 times a week to address goals. Discharge Recommendations: To Be Determined       SUBJECTIVE:   Patient stated Alpa Montiel.    OBJECTIVE:     Past Medical History:   Diagnosis Date    Hypertension      Past Surgical History:   Procedure Laterality Date    HX HIP REPLACEMENT      left hip     Prior Level of Function/Home Situation:   Home Situation  Home Environment: Private residence  # Steps to Enter: 0  One/Two Story Residence: Two story  # of Interior Steps: 13  Living Alone: No  Support Systems: Child(bing), Family member(s)  Patient Expects to be Discharged to[de-identified] Private residence  Current DME Used/Available at Home: [employer] [place], [address] chair, Grab bars, Cane, straight  Diet prior to admission: Unknown  Current Diet: NPO  Barriers to Learning/Limitations: yes;  cognitive, sensory deficits-vision/hearing/speech, and altered mental status (i.e.Sedation, Confusion)  Compensate with: visual, verbal, tactile, kinesthetic cues/model  Cognitive and Communication Status:  Neurologic State: Alert, Confused  Orientation Level: Unable to verbalize  Cognition: Decreased command following  Perception: Cues to attend left visual field, Cues to attend to left side of body, Cues to maintain midline in sitting, Tactile, Verbal  Perseveration: No perseveration noted  Safety/Judgement: Decreased insight into deficits  Oral Assessment:  Oral Assessment  Labial: Decreased rate, Decreased seal, Left droop  Dentition: Limited, Poor  Oral Hygiene: (Fair)  Lingual: Decreased rate, Decreased strength, Left deviation  Velum: No impairment  Mandible: No impairment  Gag Reflex: Other (comment)(Not tested)  P.O. Trials:  Patient Position: (HOB >30)  Vocal quality prior to P.O.: Breathy, Fatigue  Consistency Presented: Ice chips, Mechanical soft, Nectar thick liquid, Puree, Thin liquid  How Presented: Self-fed/presented, Spoon, Straw, Successive swallows  Bolus Acceptance: No impairment  Bolus Formation/Control: Impaired  Type of Impairment: Delayed, Incomplete  Propulsion: Delayed (# of seconds)  Oral Residue: Left, Less than 10% of bolus, Lingual  Initiation of Swallow: Delayed (# of seconds)  Laryngeal Elevation: Decreased, Weak  Aspiration Signs/Symptoms: Clear throat, Delayed cough/throat clear  Pharyngeal Phase Characteristics: Double swallow, Easily fatigued , Effortful swallow  Effective Modifications:  Alternate liquids/solids, Spoon, Small sips and bites  Cues for Modifications: Minimal-moderate  Oral Phase Severity: Mild-moderate  Pharyngeal Phase Severity : Mild-moderate    The severity rating is based on the following outcomes:  Clinical Judgement  MICHAEL Noms Swallow Level 1     NOMS:   The NOMS functional outcome measure was used to quantify this patient's level of swallowing impairment. Based on the NOMS, the patient was determined to be at level 1 for swallow function. NOMS Swallowing Levels:  Level 1 (CN): NPO  Level 2 (CM): NPO but takes consistency in therapy  Level 3 (CL): Takes less than 50% of nutrition p.o. and continues with nonoral feedings; and/or safe with mod cues; and/or max diet restriction  Level 4 (CK): Safe swallow but needs mod cues; and/or mod diet restriction; and/or still requires some nonoral feeding/supplements  Level 5 (CJ): Safe swallow with min diet restriction; and/or needs min cues  Level 6 (CI): Independent with p.o.; rare cues; usually self cues; may need to avoid some foods or needs extra time  Level 7 ([address] [place] [address]): Independent for all p.o.  MICHAEL. (2003). National Outcomes Measurement System (NOMS): Adult Speech-Language Pathology User's Guide. Pain:  Pre-treatment  Pain Scale 1: Adult Nonverbal Pain Scale  Pain Intensity 1: 0  Post-treatment: 0    After treatment:   []            Patient left in no apparent distress sitting up in chair  [x]            Patient left in no apparent distress in bed  [x]            Call bell left within reach  [x]            Nursing notified  []            Caregiver present  []            Bed alarm activated  COMMUNICATION/EDUCATION:   [x]           The patients plan of care including recommendations, planned interventions,            and recommended diet changes were discussed with: Registered Nurse. []            Posted safety precautions in patient's room. [x]            Patient/family have participated as able in goal setting and plan of care. []            Patient/family agree to work toward stated goals and plan of care. []            Patient understands intent and goals of therapy, but is neutral about his/her                       participation. []            Patient is unable to participate in goal setting and plan of care.     Thank you for this referral.  Josey JORGE Lakeisha Dominguez, CCC-SLP  Time Calculation: 27 mins

## 2020-05-01 NOTE — PROGRESS NOTES
Hospitalist Progress Note    Patient: Madalyn Miller MRN: [id number]  CSN: [id number]    YOB: 1950  Age: 81 y.o. Sex: female    DOA: 4/27/2020 LOS:  LOS: 4 days          Chief Complaint:       Ac resp failure    Assessment/Plan        80 y/o female with h/o hypertension,  obesity presented to the ED with SOB and MO admitted for hypertensive emergency, elevated troponin, lung nodule and abnormal chest xray suggestive of PNA.      RRT and code stroke called 4/29 for AMS  Tele neuro saw patient  Neurology inpatient saw patient 4/30  MRI order still pending, she is more awake this am so hopefully can be completed  High flow 02 in place     Acute metabolic encephalopathy-rule out stroke  CT head neg-MRI brain ordered  EEG  Appreciate neurology consult  permissive HTN  Will need speech swallow consult     Acute hypercapnic and hypoxic respiratory failure  High flow 02  Ammonia ok  No clear CT chest findings of severity     Hypertensive urgency  PRn treatment as per neurology     Elevated troponin   Likely cardiac strain due to severely elevated blood pressure  Appreciate cardiology consult , echo with nl EF     probable pneumonia  Levaquin IV  Rocephin caused arm redness     Hypokalemia -repletion per protocol     Lung nodule   13 x 12 mm nodular density in the right middle lobe   Will recheck after treating for CAP     NPO  Monitor 02 requirements  Supportive care     Diabetes, type 2-continue SSI and accuchecks Q6H     Disposition :  Patient Active Problem List   Diagnosis Code    Elevated troponin R79.89    Hypertensive emergency I16.1    Hypokalemia E87.6    Lung nodule R91.1    CAP (community acquired pneumonia) M36.6    Metabolic encephalopathy V99.51    Hyperglycemia due to type 2 diabetes mellitus ([facility] [address] E11.65    Acute respiratory failure with hypercapnia (HCC) J96.02       Subjective:  She is more awake this am  Could name [place] as her location, and with prompting could agree it ws 2020  However still weak and sleepy  She displayed some left arm weakness compared to right but during my exam she scratched her nose with left arm/hand ok      Review of systems:    Denies pain anywhere  Respiratory: denies SOB  Cardiovascular: denies chest pain  Gastrointestinal: denies nausea, vomiting, diarrhea      Vital signs/Intake and Output:  Visit Vitals  BP (!) 188/94   Pulse 94   Temp 98.8 °F (37.1 °C)   Resp 20   Ht 5' 5\" (1.651 m)   Wt 125.3 kg (276 lb 3.2 oz)   SpO2 97%   BMI 45.96 kg/m²     Current Shift:  No intake/output data recorded.   Last three shifts:  04/29 1901 - 05/01 0700  In: 300 [I.V.:300]  Out: 1660 [Urine:1660]    Exam:    General: elderly obese weak sleepy AAF, NAD  CVS:Regular rate and rhythm, no M/R/G, S1/S2 heard, no thrill  Lungs:Clear to auscultation bilaterally, no wheezes, rhonchi, or rales  Abdomen: Soft, Nontender, No distention, Normal Bowel sounds, No hepatomegaly  Extremities: No C/C/E, pulses palpable 2+  Skin:normal texture and turgor, no rashes, no lesions  Neuro:grossly normal , follows basic commands  Psych:appropriate                Labs: Results:       Chemistry Recent Labs     05/01/20  0419 04/30/20  1902 04/30/20  0500 04/29/20  0930 04/29/20  0320   *  --  132* 131* 140*     --  142 140 141   K 3.6 3.5 3.3* 3.6 3.2*     --  104 102 102   CO2 33*  --  33* 33* 33*   BUN 27*  --  27* 26* 25*   CREA 1.02  --  1.10 1.23 1.44*   CA 8.7  --  8.5 8.2* 8.1*   AGAP 4  --  5 5 6   BUCR 26*  --  25* 21* 17   AP 48  --  51  --  57   TP 6.2*  --  6.4  --  6.4   ALB 2.8*  --  2.9*  --  3.0*   GLOB 3.4  --  3.5  --  3.4   AGRAT 0.8  --  0.8  --  0.9      CBC w/Diff Recent Labs     05/01/20  0419 04/30/20  0500 04/29/20  0930 04/29/20  0320   WBC 6.6 6.6 7.3 7.0   RBC 5.06 5.09 5.10 5.25   HGB 11.6* 11.8* 11.8* 12.2   HCT 39.7 39.9 40.0 40.9    354 346 323   GRANS  --   --  74* 72   LYMPH  --   --  20* 22   EOS  --   --  0 0      Cardiac Enzymes Recent Labs     04/29/20  0930 04/29/20  0320   * 186   CKND1 1.3 1.6      Coagulation No results for input(s): PTP, INR, APTT, INREXT in the last 72 hours. Lipid Panel Lab Results   Component Value Date/Time    Cholesterol, total 174 04/28/2020 04:49 AM    HDL Cholesterol 41 04/28/2020 04:49 AM    LDL, calculated 106.6 (H) 04/28/2020 04:49 AM    VLDL, calculated 26.4 04/28/2020 04:49 AM    Triglyceride 132 04/28/2020 04:49 AM    CHOL/HDL Ratio 4.2 04/28/2020 04:49 AM      BNP No results for input(s): BNPP in the last 72 hours.    Liver Enzymes Recent Labs     05/01/20  0419   TP 6.2*   ALB 2.8*   AP 48   SGOT 19      Thyroid Studies Lab Results   Component Value Date/Time    TSH 1.98 04/28/2020 04:49 AM        Procedures/imaging: see electronic medical records for all procedures/Xrays and details which were not copied into this note but were reviewed prior to creation of Tiara Robert MD

## 2020-05-01 NOTE — PROGRESS NOTES
INITIAL NUTRITION ASSESSMENT     RECOMMENDATIONS/PLAN:   Avoid prolonged NPO status >3 days  Advance diet as soon as clinically feasible  Monitor for adequate PO intakes >75% once diet is advance  Monitor bowel function, no BM since admission  Monitor skin integrity, labs, lytes, fluid status and weight--will trend for wt loss. REASON FOR ASSESSMENT:     [x] ICU admission  NUTRITION ASSESSMENT:   Client History: 81 yrs old Female admitted with SOB. RRT and code stroke called 4/29 for AMS, possible left side weakness, and hypercapnea, no acute findings on CT brain. Pt off of bipap per respiratory note. PMHx: HTN, DDD, obesity.   Cultural/Jewish Food Preferences: None Identified    FOOD/NUTRITION HISTORY  Diet History: eating well throughout admission  Food Allergies:  [x] NKFA       Pertinent PTA Medications: reviewed    NUTRITION INTAKE   Diet Order:  NPO  Average PO Intake:       Patient Vitals for the past 100 hrs:   % Diet Eaten   04/28/20 1407 50 %   04/28/20 0957 85 %      Pertinent Medications:  [x] Reviewed; KCL, lispro  Electrolyte Replacement Protocol: []K  []Mg  []PO4    Insulin:  [] SSI  [] Pre-meal   []  Basal   [] Drip  [x] None  Pt expected to meet estimated nutrient needs through next review:          []  Yes     [x] No; NPO does not meet estimated needs ANTHROPOMETRICS  Height: 5' 5\" (165.1 cm)       Weight: 125.3 kg (276 lb 3.2 oz)    BMI: 46 kg/m^2  -  morbidly obese (Greater than or = to 40% BMI)        Weight change:  Chart reviewed, no recent wt, no evidence of recent wt loss                                  Comparison to Reference Standards:  IBW: 125 lbs      IBW: 125%      AdjBW: 74 kg    NUTRITION-FOCUSED PHYSICAL ASSESSMENT  Skin: WDL      GI: PTA    BIOCHEMICAL DATA & MEDICAL TESTS  Pertinent Labs:  [x] Reviewed    NUTRITION PRESCRIPTION  Calories: 2322 kcal/day based on miffilin x 1.3  Protein: 148 g/day based on 2.0 g/kg IBW  CHO: 290 g/day based on 50% of total energy  Fluid: 2322 ml/day based on 1 kcal/ml      NUTRITION DIAGNOSES:   1. Inadequate oral intake related to NPO status as evidence by diet order NPO    NUTRITION INTERVENTIONS:   INTERVENTIONS:        GOALS:  1. Other: advance diet as soon as clinically feasible 1. Advance diet within the next 3 days, avoid prolonged NPO status. LEARNING NEEDS (Diet, Supplementation, Food/Nutrient-Drug Interaction):   [x] None Identified  [] Inpatient education provided/documented    [] Identified and patient:  [] Declined     [] Was not appropriate/indicated  NUTRITION MONITORING /EVALUATION:   Follow PO intake  Monitor wt  Monitor renal labs, electrolytes, fluid status  Monitor for additional supplement needs    [] Participated in Interdisciplinary Rounds  [x] Interdisciplinary Care Plan Reviewed/Documented  DISCHARGE NUTRITION RECOMMENDATIONS ADDRESSED:        [x] To be determined closer to discharge    NUTRITION RISK:     [x]  At risk                     []  Not currently at risk     Will follow-up per policy.   Sunita Rachel 9

## 2020-05-01 NOTE — PROGRESS NOTES
[facility]  Pulmonary, Critical Care, and Sleep Medicine    Name: Jewel Carlos MRN: [id number]   : 1950 Hospital: [facility] [place]   Date: 2020        Pulmonary Critical Care Note    IMPRESSION:   Patient Active Problem List   Diagnosis Code    Acute on chronic hypercapnic hypoxic respiratory failure J96.21, J96.22    Encephalopathy, CVA vs metabolic vs hypercapnic K48.52    Pulmonary edema J81.1    CAP (community acquired pneumonia) J18.9    Hypertension I10    Possible RACHEL or OHS E66.2    Elevated troponin R79.89    CKD stage 3 N18.3    Hypokalemia E87.6    Lung nodule R91.1    Morbid obesity       RECOMMENDATIONS:   BiPAP at night with sleep- adjust settings per goal SPO2 91-95%  Able to protect airways. CXR for follow up in AM showed no major infiltrates  Patient has baseline hypercapnia and recent PCO2 on ABG are not far from baseline hence not completely explained changes in mentation with CO2 level alone  Supplemental O2 and titrate to NC, titrate flow for goal SPO2 91-95%  Aspiration prevention bundle, head of the bed at 30' all times  Bronchodilators PRN, pulmonary hygiene care  Antibiotic choice: finish Levofloxacin today. Follow UCx  Monitor BP; permissive hypertension. PRN Labetalol for BP > 200/110 mm Hg  Possible stress test when more stable per cardiology. Cardiology following  Diuresis as needed and tolerated  Glycemic control  MRI, EEG today; appreciate neurological evaluation  CT head limited but nil acute  AM labs. Diet NPO. Check swallow evaluation  Difficult to appreciate status of Right lung nodule between  and  CT chest with atelectasis.  May consider short term outpatient follow up as per 20 CT chest report  Patient may benefit from sleep center based sleep studies  Nadir Bundle Followed  May transfer to floor if okay with hospitalist, neurology  Will defer respective systems problem management to primary and other consultant and follow patient while in ICU with primary and other medical team  Further recommendations will be based on the patient's response to recommended treatment and results of the investigation ordered. Quality Care: PPI, DVT prophylaxis, HOB elevated, Infection control all reviewed and addressed. PAIN AND SEDATION: avoid any sedatives, hypnotics  · Skin/Wound: chronic venous stasis changes  · Nutrition: NPO pending swallow evaluation  · Prophylaxis: DVT and GI Prophylaxis reviewed. · PT/OT eval and treat: when stable   · Lines/Tubes: lines PIV; zaragoza 4/29/20  ADVANCE DIRECTIVE: Full code  DISCUSSION: spoke with RN, RT, patient, Dr. Kirk Hough and notes from last 24 hours reviewed. Care plan discussed with nursing      Subjective/History:   Ms. Isabel Morales has been seen and evaluated as Dr. Leona De Leon requested now for Acute on chronic hypercapnic hypoxic respiratory failure. Patient is a 81 y.o. female with pmhx for hypertension, DDD, obesity presents to the ED with SOB and MO admitted for hypertensive emergency, elevated troponin, lung nodule and abnormal chest xray treated for PNA, pulmonary edema. Her hospital course was complicated today by finding of change in mental status, somnolence; would awake and answer a simple question and then go back to sleep. ABG showed acute on chronic hypercapnia, C02 71, P02 117, ph 7.302. Stat CTA Chest no PE, CT head nil acute. Code Stroke was called; tele neurologist suspected more global changes. As per discussion with other providers, AAO x 3 yesterday. Non smoker and no hx of COPD, known RACHEL. This patient was discussed in detail with Goldie Hassan, Marjorie, addressed any possible COVID19 symptoms such as fever, cough, confirmed or possible contacts and any other pertinent signs, symptoms and no such findings or risks in this patient for Novel Coronavirus. Other information is limited.     05/01/20  Patient used BiPAP yesterday and overnight; on FiO2 26% with HF  This AM, awake, alert, O x 1 -place, follows commands, verbal  Hemodynamically stable; HTN stable with permissive strategy  No fever. UO fair. NPO. Unable to get MRI yesterday    Review of Systems:  Review of systems not obtained due to patient factors. Past Medical History:  Past Medical History:   Diagnosis Date    Hypertension         Past Surgical History:  Past Surgical History:   Procedure Laterality Date    HX HIP REPLACEMENT      left hip        Medications:  Prior to Admission medications    Medication Sig Start Date End Date Taking? Authorizing Provider   traMADol (ULTRAM) 50 mg tablet Take 1 Tab by mouth every six (6) hours as needed for Pain. Max Daily Amount: 200 mg. 8/1/16   JOSEFINA Leyva [facility] RW 1/4/14   Indu Sethi DO   losartan 50 mg tab 100 mg, hydrochlorothiazide 25 mg tab 25 mg Take  by mouth daily. Other, MD Kennedy   amLODIPine (NORVASC) 10 mg tablet Take  by mouth daily. Kennedy Dang MD   meloxicam (MOBIC) 15 mg tablet Take 15 mg by mouth daily. Kennedy Dang MD       Current Facility-Administered Medications   Medication Dose Route Frequency    atorvastatin (LIPITOR) tablet 20 mg  20 mg Oral DAILY    insulin lispro (HUMALOG) injection   SubCUTAneous Q6H    levoFLOXacin (LEVAQUIN) 500 mg in D5W IVPB  500 mg IntraVENous Q24H    aspirin delayed-release tablet 81 mg  81 mg Oral DAILY    heparin (porcine) injection 5,000 Units  5,000 Units SubCUTAneous Q8H    sodium chloride (NS) flush 5-40 mL  5-40 mL IntraVENous Q8H       Allergy:  Allergies   Allergen Reactions    Rocephin [Ceftriaxone] Other (comments)     Flushing        Social History:  Social History     Tobacco Use    Smoking status: Never Smoker    Smokeless tobacco: Never Used   Substance Use Topics    Alcohol use: No    Drug use: No        Family History:  History reviewed. No pertinent family history.        Objective:   Vital Signs:    Blood pressure (!) 188/94, pulse 94, temperature 98.8 °F (37.1 °C), resp. rate 20, height 5' 5\" (1.651 m), weight 125.3 kg (276 lb 3.2 oz), SpO2 97 %. Body mass index is 45.96 kg/m². O2 Device: Hi flow nasal cannula(vapotherm)   O2 Flow Rate (L/min): 20 l/min   Temp (24hrs), Av.8 °F (37.1 °C), Min:98.5 °F (36.9 °C), Max:99.1 °F (37.3 °C)         Intake/Output:   Last shift:      No intake/output data recorded. Last 3 shifts:  1901 -  0700  In: 300 [I.V.:300]  Out: 1660 [Urine:1660]    Intake/Output Summary (Last 24 hours) at 2020 1203  Last data filed at 2020 0525  Gross per 24 hour   Intake 200 ml   Output 400 ml   Net -200 ml       Physical Exam:  General: AAO x 1, in no distress, protecting airways, appears older than stated age, morbidly obese, on HF NO2  HEENT: PERRLA, throat normal without erythema or exudate  Neck: No abnormally enlarged lymph nodes or thyroid, supple  Chest: normal, obese chest wall  Lungs: moderate air entry, few rhonchi scattered bilaterally, normal percussion anterior chest bilaterally, no tenderness/ rash, exam limitations  Heart: Regular rate and rhythm, S1S2 present or without murmur or extra heart sounds  Abdomen: obese, bowel sounds normoactive, tympanic, soft without significant tenderness, masses, organomegaly or guarding, rigidity, rebound  Extremity: no bl LE edema; no cyanosis, clubbing  Capillary refill: normal  Neuro: AAO x 3, follows simple commands, tracking in room, communicative, moves all extremities well with possible mild weakness on LT side, no involuntary movements, exam limitations  Skin: Skin color, texture, turgor fair.  Skin dry, warm, non-diaphoretic    Data:     Recent Results (from the past 24 hour(s))   URINALYSIS W/ RFLX MICROSCOPIC    Collection Time: 20  2:40 PM   Result Value Ref Range    Color YELLOW      Appearance CLEAR      Specific gravity 1.028 1.005 - 1.030      pH (UA) 5.0 5.0 - 8.0      Protein 30 (A) NEG mg/dL    Glucose Negative NEG mg/dL    Ketone Negative NEG mg/dL    Bilirubin Negative NEG      Blood Negative NEG      Urobilinogen 1.0 0.2 - 1.0 EU/dL    Nitrites Negative NEG      Leukocyte Esterase Negative NEG     URINE MICROSCOPIC ONLY    Collection Time: 04/30/20  2:40 PM   Result Value Ref Range    WBC 4 to 10 0 - 5 /hpf    RBC 0 to 3 0 - 5 /hpf    Epithelial cells FEW 0 - 5 /lpf    Bacteria FEW (A) NEG /hpf   GLUCOSE, POC    Collection Time: 04/30/20  6:13 PM   Result Value Ref Range    Glucose (POC) 103 70 - 110 mg/dL   POTASSIUM    Collection Time: 04/30/20  7:02 PM   Result Value Ref Range    Potassium 3.5 3.5 - 5.5 mmol/L   GLUCOSE, POC    Collection Time: 04/30/20 11:28 PM   Result Value Ref Range    Glucose (POC) 112 (H) 70 - 110 mg/dL   POC G3    Collection Time: 05/01/20  3:36 AM   Result Value Ref Range    Device: BIPAP      FIO2 (POC) 0.26 %    pH (POC) 7.362 7.35 - 7.45      pCO2 (POC) 60.8 (H) 35.0 - 45.0 MMHG    pO2 (POC) 79 (L) 80 - 100 MMHG    HCO3 (POC) 34.5 (H) 22 - 26 MMOL/L    sO2 (POC) 95 92 - 97 %    Base excess (POC) 9 mmol/L    PEEP/CPAP (POC) 7 cmH2O    PIP (POC) 12      Pressure support 5 cmH2O    Allens test (POC) N/A      Total resp. rate 22      Site RIGHT RADIAL      Patient temp.  98.7      Specimen type (POC) ARTERIAL      Performed by Migue Pyle     Spontaneous timed YES     CBC W/O DIFF    Collection Time: 05/01/20  4:19 AM   Result Value Ref Range    WBC 6.6 4.6 - 13.2 K/uL    RBC 5.06 4.20 - 5.30 M/uL    HGB 11.6 (L) 12.0 - 16.0 g/dL    HCT 39.7 35.0 - 45.0 %    MCV 78.5 74.0 - 97.0 FL    MCH 22.9 (L) 24.0 - 34.0 PG    MCHC 29.2 (L) 31.0 - 37.0 g/dL    RDW 15.9 (H) 11.6 - 14.5 %    PLATELET 232 527 - 332 K/uL    MPV 10.0 9.2 - 82.3 FL   METABOLIC PANEL, COMPREHENSIVE    Collection Time: 05/01/20  4:19 AM   Result Value Ref Range    Sodium 144 136 - 145 mmol/L    Potassium 3.6 3.5 - 5.5 mmol/L    Chloride 107 100 - 111 mmol/L    CO2 33 (H) 21 - 32 mmol/L    Anion gap 4 3.0 - 18 mmol/L    Glucose 110 (H) 74 - 99 mg/dL    BUN 27 (H) 7.0 - 18 MG/DL    Creatinine 1.02 0.6 - 1.3 MG/DL    BUN/Creatinine ratio 26 (H) 12 - 20      GFR est AA >60 >60 ml/min/1.73m2    GFR est non-AA 54 (L) >60 ml/min/1.73m2    Calcium 8.7 8.5 - 10.1 MG/DL    Bilirubin, total 0.6 0.2 - 1.0 MG/DL    ALT (SGPT) 19 13 - 56 U/L    AST (SGOT) 19 10 - 38 U/L    Alk. phosphatase 48 45 - 117 U/L    Protein, total 6.2 (L) 6.4 - 8.2 g/dL    Albumin 2.8 (L) 3.4 - 5.0 g/dL    Globulin 3.4 2.0 - 4.0 g/dL    A-G Ratio 0.8 0.8 - 1.7     MAGNESIUM    Collection Time: 05/01/20  4:19 AM   Result Value Ref Range    Magnesium 2.4 1.6 - 2.6 mg/dL   PHOSPHORUS    Collection Time: 05/01/20  4:19 AM   Result Value Ref Range    Phosphorus 2.9 2.5 - 4.9 MG/DL   GLUCOSE, POC    Collection Time: 05/01/20  5:21 AM   Result Value Ref Range    Glucose (POC) 101 70 - 110 mg/dL           Recent Labs     05/01/20  0336 04/30/20  0517 04/29/20  1152   FIO2I 0.26 0.26 24   HCO3I 34.5* 35.3* 35.2*   PCO2I 60.8* 65.7* 61.0*   PHI 7.362 7.339* 7.369   PO2I 79* 65* 60*       All Micro Results     Procedure Component Value Units Date/Time    CULTURE, URINE [[id number]] Collected:  04/30/20 1440    Order Status:  Completed Specimen:  Urine from Clean catch Updated:  05/01/20 1144    CULTURE, URINE [[id number]] Collected:  04/29/20 1330    Order Status:  Completed Specimen:  Cath Urine Updated:  04/30/20 1832     Special Requests: NO SPECIAL REQUESTS        Culture result: No growth (<1,000 CFU/ML)             Telemetry: normal sinus rhythm    4/28/20 ECHO  · Left Ventricle: Normal cavity size, wall thickness and systolic function (ejection fraction normal). The estimated ejection fraction is 55 - 60%. There is mild (grade 1) left ventricular diastolic dysfunction E'A ratio= 0.90. · Pulmonary Artery: Pulmonary arteries not well visualized. Pulmonary arterial systolic pressure (PASP) is 31 mmHg. Pulmonary hypertension found to be mild.     Imaging:  [x]I have personally reviewed the patients chest radiographs images and report   5/1/20  Results from Hospital Encounter encounter on 04/27/20   XR CHEST PORT    Narrative AP portable chest, 5/1/2020 at 0445 hours:    INDICATION: Shortness of breath and congestive heart failure. Hypoinflation with elevation right hemidiaphragm. Top normal heart size. Mild  thoracic aortic ectasia. No new infiltrate or definitive acute congestive heart  failure. Impression IMPRESSION:  No definite interval change. Results from [place] encounter on 04/27/20   CT HEAD WO CONT    Narrative EXAM: CT HEAD WO CONT    CLINICAL INDICATION/HISTORY: change in mental status    COMPARISON: April 29, 2020    TECHNIQUE: Axial CT imaging of the head was performed without intravenous  contrast. Sagittal and coronal reconstructions are provided. One or more dose  reduction techniques were used on this CT: automated exposure control,  adjustment of the mAs and/or kVp according to patient size, and iterative  reconstruction techniques. The specific techniques used on this CT exam have  been documented in the patient's electronic medical record. Digital Imaging and  Communications in Medicine (DICOM) format image data are available to  nonaffiliated external healthcare facilities or entities on a secure, media  free, reciprocally searchable basis with patient authorization for at least a  12-month period after this study      _______________    FINDINGS: Image quality degraded by moderate to severe motion artifact. BRAIN AND POSTERIOR FOSSA: The sulci, folia, ventricles and basal cisterns are  within normal limits for the patient's age. There is no intracranial hemorrhage,  mass effect, or midline shift. There are no areas of abnormal parenchymal  attenuation. EXTRA-AXIAL SPACES AND MENINGES: There are no abnormal extra-axial fluid  collections. CALVARIUM: Intact. SINUSES: Clear.     OTHER: None.    _______________      Impression IMPRESSION:    Limited study secondary to motion. No definite acute intracranial abnormalities. [x]See my orders for details    My assessment, plan of care, findings, medications, side effects etc were discussed with:  [x]nursing []PT/OT    [x]respiratory therapy []Dr. Tae Middleton   []family [x]Patient     [x]Total critical care time exclusive of procedures 36 minutes with complex decision making performed and > 50% time spent in face to face evaluation.     Jolie Sandoval MD

## 2020-05-01 NOTE — PROGRESS NOTES
NEUROLOGY PROGRESS NOTE        Patient: Sue Jimenez Minor        Sex: female          DOA: 4/27/2020  YOB: 1950      Age:  81 y.o.         LOS: 4 days     Identification:  82 YO female presents with hypertensive emergency, hypertensive encephalopathy and acute confusion with left-sided weakness. SUBJECTIVE:   No acute event overnight. Stroke Work-up:  Brain MRI: Result Date: 5/1/2020  Impression: 1. The study is markedly limited by patient motion. If symptoms persist, repeat is recommended when the patient is more able to tolerate or can be sedated. 2. Multiple bilateral hemispheric areas of diffusion abnormality in large part restricted diffusion. The pattern would be more consistent with multiple infarctions most likely embolic more so than permanent brain injury from hypertensive encephalopathy. No hemorrhage or definitive mass effect. Ct Head Wo Cont Result Date: 4/29/2020  IMPRESSION: Limited study secondary to motion. No definite acute intracranial abnormalities. CTA of head and neck:  Echocardiogram:   · Left Ventricle: Normal cavity size, wall thickness and systolic function (ejection fraction normal). The estimated ejection fraction is 55 - 60%. There is mild (grade 1) left ventricular diastolic dysfunction E'A ratio= 0.90. · Pulmonary Artery: Pulmonary arteries not well visualized. Pulmonary arterial systolic pressure (PASP) is 31 mmHg. Pulmonary hypertension found to be mild. Lipid panel:   Lab Results   Component Value Date/Time    Cholesterol, total 174 04/28/2020 04:49 AM    HDL Cholesterol 41 04/28/2020 04:49 AM    LDL, calculated 106.6 (H) 04/28/2020 04:49 AM    VLDL, calculated 26.4 04/28/2020 04:49 AM    Triglyceride 132 04/28/2020 04:49 AM    CHOL/HDL Ratio 4.2 04/28/2020 04:49 AM     HbA1c:   Lab Results   Component Value Date/Time    Hemoglobin A1c 7.1 (H) 04/28/2020 02:28 PM         REVIEW OF SYSTEMS: Denies chest pain, abdominal pain, nausea or vomiting. No fever or chills. OBJECTIVE:      Visit Vitals  BP (!) 198/98   Pulse 93   Temp 98.6 °F (37 °C)   Resp 21   Ht 5' 5\" (1.651 m)   Wt 125.3 kg (276 lb 3.2 oz)   SpO2 (!) 89%   BMI 45.96 kg/m²     Physical Exam:  GEN: Alert, NAD  EYES: conjunctiva normal, lids with out lesions  HEENT: MMM. HEART: RRR +S1 +S2  LUNGS: CTA B/L no rales or rhonchi. ABDOMEN: Soft, non-tender. EXTREMITIES: No edema cyanosis  SKIN: no rashes or skin breakdown, no nodules  NEURO: Awake and alert to her name and hospital.  Patient is not able to time. Speech is fluent. No dysarthria. Cranials: Face is symmetric. Smiles symmetrically. EOMI, VFF. Tongue is midline. Motor: 5/5 bilateral upper limbs and right lower limb, 4+/5 left lower limb. Sensory: Grossly normal in light touch. Coordination: Intact with no dysmetria during FNF.      Current Facility-Administered Medications   Medication Dose Route Frequency    atorvastatin (LIPITOR) tablet 20 mg  20 mg Oral DAILY    labetaloL (NORMODYNE;TRANDATE) 20 mg/4 mL (5 mg/mL) injection 10 mg  10 mg IntraVENous Q6H PRN    albuterol-ipratropium (DUO-NEB) 2.5 MG-0.5 MG/3 ML  3 mL Nebulization Q4H PRN    insulin lispro (HUMALOG) injection   SubCUTAneous Q6H    glucose chewable tablet 16 g  4 Tab Oral PRN    glucagon (GLUCAGEN) injection 1 mg  1 mg IntraMUSCular PRN    dextrose 10% infusion 125-250 mL  125-250 mL IntraVENous PRN    ELECTROLYTE REPLACEMENT PROTOCOL - Potassium Renal Dosing  1 Each Other PRN    ELECTROLYTE REPLACEMENT PROTOCOL - Magnesium   1 Each Other PRN    ELECTROLYTE REPLACEMENT PROTOCOL  - Phosphorus Renal Dosing  1 Each Other PRN    levoFLOXacin (LEVAQUIN) 500 mg in D5W IVPB  500 mg IntraVENous Q24H    aspirin delayed-release tablet 81 mg  81 mg Oral DAILY    heparin (porcine) injection 5,000 Units  5,000 Units SubCUTAneous Q8H    sodium chloride (NS) flush 5-40 mL  5-40 mL IntraVENous Q8H    sodium chloride (NS) flush 5-40 mL  5-40 mL IntraVENous PRN    ondansetron (ZOFRAN) injection 4 mg  4 mg IntraVENous Q4H PRN       Laboratory  Recent Results (from the past 24 hour(s))   GLUCOSE, POC    Collection Time: 04/30/20  6:13 PM   Result Value Ref Range    Glucose (POC) 103 70 - 110 mg/dL   POTASSIUM    Collection Time: 04/30/20  7:02 PM   Result Value Ref Range    Potassium 3.5 3.5 - 5.5 mmol/L   GLUCOSE, POC    Collection Time: 04/30/20 11:28 PM   Result Value Ref Range    Glucose (POC) 112 (H) 70 - 110 mg/dL   POC G3    Collection Time: 05/01/20  3:36 AM   Result Value Ref Range    Device: BIPAP      FIO2 (POC) 0.26 %    pH (POC) 7.362 7.35 - 7.45      pCO2 (POC) 60.8 (H) 35.0 - 45.0 MMHG    pO2 (POC) 79 (L) 80 - 100 MMHG    HCO3 (POC) 34.5 (H) 22 - 26 MMOL/L    sO2 (POC) 95 92 - 97 %    Base excess (POC) 9 mmol/L    PEEP/CPAP (POC) 7 cmH2O    PIP (POC) 12      Pressure support 5 cmH2O    Allens test (POC) N/A      Total resp. rate 22      Site RIGHT RADIAL      Patient temp.  98.7      Specimen type (POC) ARTERIAL      Performed by Christiane Eli     Spontaneous timed YES     CBC W/O DIFF    Collection Time: 05/01/20  4:19 AM   Result Value Ref Range    WBC 6.6 4.6 - 13.2 K/uL    RBC 5.06 4.20 - 5.30 M/uL    HGB 11.6 (L) 12.0 - 16.0 g/dL    HCT 39.7 35.0 - 45.0 %    MCV 78.5 74.0 - 97.0 FL    MCH 22.9 (L) 24.0 - 34.0 PG    MCHC 29.2 (L) 31.0 - 37.0 g/dL    RDW 15.9 (H) 11.6 - 14.5 %    PLATELET 400 441 - 555 K/uL    MPV 10.0 9.2 - 21.9 FL   METABOLIC PANEL, COMPREHENSIVE    Collection Time: 05/01/20  4:19 AM   Result Value Ref Range    Sodium 144 136 - 145 mmol/L    Potassium 3.6 3.5 - 5.5 mmol/L    Chloride 107 100 - 111 mmol/L    CO2 33 (H) 21 - 32 mmol/L    Anion gap 4 3.0 - 18 mmol/L    Glucose 110 (H) 74 - 99 mg/dL    BUN 27 (H) 7.0 - 18 MG/DL    Creatinine 1.02 0.6 - 1.3 MG/DL    BUN/Creatinine ratio 26 (H) 12 - 20      GFR est AA >60 >60 ml/min/1.73m2    GFR est non-AA 54 (L) >60 ml/min/1.73m2    Calcium 8.7 8.5 - 10.1 MG/DL    Bilirubin, total 0.6 0.2 - 1.0 MG/DL    ALT (SGPT) 19 13 - 56 U/L    AST (SGOT) 19 10 - 38 U/L    Alk. phosphatase 48 45 - 117 U/L    Protein, total 6.2 (L) 6.4 - 8.2 g/dL    Albumin 2.8 (L) 3.4 - 5.0 g/dL    Globulin 3.4 2.0 - 4.0 g/dL    A-G Ratio 0.8 0.8 - 1.7     MAGNESIUM    Collection Time: 05/01/20  4:19 AM   Result Value Ref Range    Magnesium 2.4 1.6 - 2.6 mg/dL   PHOSPHORUS    Collection Time: 05/01/20  4:19 AM   Result Value Ref Range    Phosphorus 2.9 2.5 - 4.9 MG/DL   GLUCOSE, POC    Collection Time: 05/01/20  5:21 AM   Result Value Ref Range    Glucose (POC) 101 70 - 110 mg/dL   POTASSIUM    Collection Time: 05/01/20 12:18 PM   Result Value Ref Range    Potassium 3.7 3.5 - 5.5 mmol/L   GLUCOSE, POC    Collection Time: 05/01/20 12:29 PM   Result Value Ref Range    Glucose (POC) 102 70 - 110 mg/dL       Radiology:  Mri Brain Wo Cont    Result Date: 5/1/2020  Brain MR without contrast: Indication: Decreased level of consciousness. Bilateral weakness. History of recent severe hypertension and hypertensive encephalopathy. Procedure: Sagittal spin echo T1, axial FSE FLAIR and T2 and axial diffusion weighted scanning was performed. An axial T2* scan optimized to detect hemosiderin and calcium was performed. Coronal spin echo T1and FSE T2 scans were also performed. No contrast was administered. Comparison exam: Head CT 4/29/2020 Findings: This study is markedly limited by patient motion. Diffusion: The diffusion sequence is actually relatively diagnostic. There are several bilateral areas of restricted diffusion in the hemispheres. These are located in the subcortical white matter bilaterally most prominent in the right frontal parietal deep white matter but also in cortex and subcortical white matter. Low level increased signal left cerebral peduncle.  There is a subcortical left temporal lobe area of high diffusion signal. There is a left parieto-occipital superficial cortical area of high diffusion signal. The axial gradient echo examination is quite limited by motion. Brain parenchyma: The FLAIR T2 and T1 imaging is very limited by patient motion. Some of the areas of restricted diffusion are likely evident as high signal areas but very limited evaluation. No definite mass effect. Ventricles and sulci: Normal, no significant brain atrophy. Extra axial: No extra axial fluid collection or mass. Brain vasculature: Normal, no arterial vascular abnormality is noted. Craniocervical Junction: Very limited evaluation due to patient motion. Extracranial and skull base:  No gross abnormality but limited evaluation. Impression: 1. The study is markedly limited by patient motion. If symptoms persist, repeat is recommended when the patient is more able to tolerate or can be sedated. 2. Multiple bilateral hemispheric areas of diffusion abnormality in large part restricted diffusion. The pattern would be more consistent with multiple infarctions most likely embolic more so than permanent brain injury from hypertensive encephalopathy. No hemorrhage or definitive mass effect. Ct Head Wo Cont    Result Date: 4/29/2020  EXAM: CT HEAD WO CONT CLINICAL INDICATION/HISTORY: change in mental status COMPARISON: April 29, 2020 TECHNIQUE: Axial CT imaging of the head was performed without intravenous contrast. Sagittal and coronal reconstructions are provided. One or more dose reduction techniques were used on this CT: automated exposure control, adjustment of the mAs and/or kVp according to patient size, and iterative reconstruction techniques. The specific techniques used on this CT exam have been documented in the patient's electronic medical record.  Digital Imaging and Communications in Medicine (DICOM) format image data are available to nonaffiliated external healthcare facilities or entities on a secure, media free, reciprocally searchable basis with patient authorization for at least a 12-month period after this study _______________ FINDINGS: Image quality degraded by moderate to severe motion artifact. BRAIN AND POSTERIOR FOSSA: The sulci, folia, ventricles and basal cisterns are within normal limits for the patient's age. There is no intracranial hemorrhage, mass effect, or midline shift. There are no areas of abnormal parenchymal attenuation. EXTRA-AXIAL SPACES AND MENINGES: There are no abnormal extra-axial fluid collections. CALVARIUM: Intact. SINUSES: Clear. OTHER: None. _______________     IMPRESSION: Limited study secondary to motion. No definite acute intracranial abnormalities. Ct Head Wo Cont    Result Date: 4/29/2020  EXAM: CT head INDICATION: Hypertension. Pain. COMPARISON: April 27, 2020. TECHNIQUE: Axial CT imaging of the head was performed without intravenous contrast. Dose reduction techniques used: automated exposure control, adjustment of the mAs and/or kVp according to patient size, and iterative reconstruction techniques. Digital imaging and communications in Medicine (DICOM) format image data are available to nonaffiliated external healthcare facilities or entities on a secure, media free, reciprocally searchable basis with patient authorization for at least 12 months after this study. _______________ FINDINGS: BRAIN AND POSTERIOR FOSSA: The sulci, folia, ventricles and basal cisterns are within normal limits for the patient's age. There is no intracranial hemorrhage, mass effect, or midline shift. There are no areas of abnormal parenchymal attenuation. EXTRA-AXIAL SPACES AND MENINGES: There are no abnormal extra-axial fluid collections. CALVARIUM: Intact. SINUSES: Clear. OTHER: None. _______________     IMPRESSION: No acute intracranial abnormalities.     Ct Head Wo Cont    Result Date: 4/27/2020  EXAM: CT head INDICATION: Hypertension emergency COMPARISON: January 28, 2014 TECHNIQUE: Axial CT imaging of the head was performed without intravenous contrast. One or more dose reduction techniques were used on this CT: automated exposure control, adjustment of the mAs and/or kVp according to patient size, and iterative reconstruction techniques. The specific techniques used on this CT exam have been documented in the patient's electronic medical record. Digital Imaging and Communications in Medicine (DICOM) format image data are available to nonaffiliated external healthcare facilities or entities on a secure, media free, reciprocally searchable basis with patient authorization for at least a 12-month period after this study. _______________ FINDINGS: BRAIN AND POSTERIOR FOSSA: The sulci, folia, ventricles and basal cisterns are within normal limits for the patient's age. There is no intracranial hemorrhage, mass effect, or midline shift. There are no areas of abnormal parenchymal attenuation. EXTRA-AXIAL SPACES AND MENINGES: There are no abnormal extra-axial fluid collections. CALVARIUM: Intact. SINUSES: Clear. OTHER: None. _______________     IMPRESSION: No acute intracranial abnormalities. Ct Chest Wo Cont    Result Date: 4/27/2020  EXAM: CT chest INDICATION: Shortness of breath COMPARISON: August 4, 2019 TECHNIQUE: Axial CT imaging from the thoracic inlet through the diaphragm without intravenous contrast. Multiplanar reformats were generated. One or more dose reduction techniques were used on this CT: automated exposure control, adjustment of the mAs and/or kVp according to patient size, and iterative reconstruction techniques. The specific techniques used on this CT exam have been documented in the patient's electronic medical record. Digital Imaging and Communications in Medicine (DICOM) format image data are available to nonaffiliated external healthcare facilities or entities on a secure, media free, reciprocally searchable basis with patient authorization for at least a 12-month period after this study. _______________ FINDINGS: THYROID GLAND: There is an enlarged left thyroid lobe with substernal extension measuring 3.8 x 3.3 cm. LYMPH NODES: No enlarged lymph nodes seen. PLEURA: There are no pleural effusion. HEART: Normal without pericardial effusion. Moderate calcific coronary artery disease present. VASCULATURE/MEDIASTINUM: Mild to moderate calcific atherosclerosis present. There is a small hiatal hernia. LUNGS: There is right lower lobe atelectasis and/or infiltrate. There is a 13 x 12 mm nodular density in the right middle lobe which may represent round atelectasis or pneumonia however only nodule not excluded. AIRWAY: Normal. UPPER ABDOMEN: There is a right renal cyst measuring 2.4 cm long the midpole. Otherwise the visualized solid organs are unremarkable. Post gastric bypass changes present. OTHER: No acute or aggressive osseous abnormalities identified. Elevated right hemidiaphragm. _______________     IMPRESSION: There is right lower lobe atelectasis and/or infiltrate. Elevated right hemidiaphragm. Enlarged left thyroid lobe with possible underlying nodule substernal extension. Advise thyroid ultrasound for further evaluation on a nonemergent basis. There is a 13 x 12 mm nodular density in the right middle lobe which may represent round atelectasis or pneumonia however underlying pulmonary nodule is not excluded. Follow-up as per Fleischner Society protocol. ======== Fleischner Society Pulmonary Nodule Guidelines (revised 2017): Solid nodule >8 mm: Consider CT, PET CT, or tissue sampling at 3 months. Cta Chest W Or W Wo Cont    Result Date: 4/29/2020  EXAM: CTA CHEST W OR W WO CONT CLINICAL INDICATION/HISTORY: Respiratory Distress COMPARISON: Correlation with CT the chest April 27, 2020 TECHNIQUE: Axial CT imaging from the thoracic inlet through the diaphragm with intravenous contrast. Coronal and sagittal MIP reformats were generated. One or more dose reduction techniques were used on this CT: automated exposure control, adjustment of the mAs and/or kVp according to patient size, and iterative reconstruction techniques.   The specific techniques used on this CT exam have been documented in the patient's electronic medical record. Digital Imaging and Communications in Medicine (DICOM) format image data are available to nonaffiliated external healthcare facilities or entities on a secure, media free, reciprocally searchable basis with patient authorization for at least a 12-month period after this study. _______________ FINDINGS: EXAM QUALITY: Adequate for diagnosis. However, moderate artifacts degrade image quality particular the lung bases. Satisfactory enhancement of the pulmonary arterial vasculature. PULMONARY ARTERIES: No evidence of pulmonary embolism. MEDIASTINUM: Normal heart size. No evidence of right heart strain. Aorta is unremarkable. No pericardial effusion. Left thyroid soft tissue mass measures LUNGS: Progressive volume loss and atelectasis noted in the right lung base. Mild hazy perihilar groundglass opacities may related to motion and atelectasis versus mild edema. PLEURA: Normal. AIRWAY: Normal. LYMPH NODES: No enlarged nodes. UPPER ABDOMEN: Small hiatal hernia. OTHER: No acute or aggressive osseous abnormalities identified. Degenerative changes noted in both shoulders. Multilevel thoracic spondylosis. _______________     IMPRESSION: 1. Limited study due to motion however, no convincing evidence of pulmonary embolism. 2. Progressive right basilar volume loss and atelectasis. 3. Suspect mild interstitial edema. Xr Chest Port    Result Date: 5/1/2020  AP portable chest, 5/1/2020 at 0445 hours: INDICATION: Shortness of breath and congestive heart failure. Hypoinflation with elevation right hemidiaphragm. Top normal heart size. Mild thoracic aortic ectasia. No new infiltrate or definitive acute congestive heart failure. IMPRESSION: No definite interval change.     Xr Chest Port    Result Date: 4/29/2020  EXAM: XR CHEST PORT CLINICAL INDICATION/HISTORY: AMS -Additional: None COMPARISON: 4/27/2020 TECHNIQUE: Portable frontal view of the chest _______________ FINDINGS: SUPPORT DEVICES: None. HEART AND MEDIASTINUM: Stable cardiomediastinal silhouette. LUNGS AND PLEURAL SPACES: Elevated right hemidiaphragm. Mildly prominent interstitial markings versus hypoinflation. No dense consolidation, large effusion or pneumothorax. _______________     IMPRESSION: Elevated right hemidiaphragm. Mildly prominent interstitial markings versus hypoinflation. Xr Chest Port    Result Date: 4/27/2020  EXAM:  AP Portable Chest X-ray 1 view INDICATION: Shortness of breath COMPARISON: August 2, 2009 _______________ FINDINGS: There is shallow inspiration. Heart size appears enlarged likely due to AP magnification. There is an elevated right hemidiaphragm. There are increased interstitial lung markings which may be secondary to shallow inspiration or mild pulmonary edema. There is suggestion of right lung base atelectasis. There are no pleural effusions. No acute osseous findings. ________________      IMPRESSION: Increased interstitial lung markings which may be secondary to shallow inspiration or mild pulmonary edema. Right lung base atelectasis. ASSESSMENT/IMPRESSION:   82-year-old female presents with hypertensive emergency, hypertensive encephalopathy, acute on chronic hypercapnic hypoxic respiratory failure, abnormal troponin elevation, who had sudden worsening of confusion and unilateral weakness. 1. Acute ischemic infarct: Bilateral hemisphere, embolic ischemic infarction. Etiology unclear likely cardiogenic. TTE is normal.  2. Encephalopathy: Multifactorial, hypertensive encephalopathy and hypercapnic hypoxic respiratory failure. 3. Hypertensive emergency  PLAN/RECOMMENDATIONS:  1. CTA head and neck. 2. SUMAYA when blood pressure is stable. Patient may need loop recorder. 3. Normotensive. I resume Norvasc and Hyzaar. 4. Continue aspirin 81 mg daily and atorvastatin 20 mg daily. I will follow the patient.  Please do not hesitate to return with any questions. Signed:   Farideh Ramos MD  5/1/2020  5:17 PM

## 2020-05-02 LAB
ALBUMIN SERPL-MCNC: 3 G/DL (ref 3.4–5)
ALBUMIN/GLOB SERPL: 0.8 {RATIO} (ref 0.8–1.7)
ALP SERPL-CCNC: 49 U/L (ref 45–117)
ALT SERPL-CCNC: 21 U/L (ref 13–56)
ANION GAP SERPL CALC-SCNC: 5 MMOL/L (ref 3–18)
ARTERIAL PATENCY WRIST A: YES
AST SERPL-CCNC: 16 U/L (ref 10–38)
BACTERIA SPEC CULT: NORMAL
BASE EXCESS BLD CALC-SCNC: 10 MMOL/L
BDY SITE: ABNORMAL
BILIRUB SERPL-MCNC: 0.5 MG/DL (ref 0.2–1)
BUN SERPL-MCNC: 26 MG/DL (ref 7–18)
BUN/CREAT SERPL: 27 (ref 12–20)
CALCIUM SERPL-MCNC: 9.1 MG/DL (ref 8.5–10.1)
CHLORIDE SERPL-SCNC: 108 MMOL/L (ref 100–111)
CO2 SERPL-SCNC: 32 MMOL/L (ref 21–32)
CREAT SERPL-MCNC: 0.95 MG/DL (ref 0.6–1.3)
ERYTHROCYTE [DISTWIDTH] IN BLOOD BY AUTOMATED COUNT: 16 % (ref 11.6–14.5)
GAS FLOW.O2 O2 DELIVERY SYS: ABNORMAL L/MIN
GAS FLOW.O2 SETTING OXYMISER: 2 L/M
GLOBULIN SER CALC-MCNC: 3.6 G/DL (ref 2–4)
GLUCOSE BLD STRIP.AUTO-MCNC: 102 MG/DL (ref 70–110)
GLUCOSE BLD STRIP.AUTO-MCNC: 106 MG/DL (ref 70–110)
GLUCOSE BLD STRIP.AUTO-MCNC: 111 MG/DL (ref 70–110)
GLUCOSE BLD STRIP.AUTO-MCNC: 98 MG/DL (ref 70–110)
GLUCOSE SERPL-MCNC: 100 MG/DL (ref 74–99)
HCO3 BLD-SCNC: 35.6 MMOL/L (ref 22–26)
HCT VFR BLD AUTO: 41.4 % (ref 35–45)
HGB BLD-MCNC: 12.2 G/DL (ref 12–16)
MCH RBC QN AUTO: 23.4 PG (ref 24–34)
MCHC RBC AUTO-ENTMCNC: 29.5 G/DL (ref 31–37)
MCV RBC AUTO: 79.5 FL (ref 74–97)
O2/TOTAL GAS SETTING VFR VENT: 28 %
PCO2 BLD: 61.2 MMHG (ref 35–45)
PH BLD: 7.37 [PH] (ref 7.35–7.45)
PLATELET # BLD AUTO: 342 K/UL (ref 135–420)
PMV BLD AUTO: 10.6 FL (ref 9.2–11.8)
PO2 BLD: 65 MMHG (ref 80–100)
POTASSIUM SERPL-SCNC: 4 MMOL/L (ref 3.5–5.5)
PROT SERPL-MCNC: 6.6 G/DL (ref 6.4–8.2)
RBC # BLD AUTO: 5.21 M/UL (ref 4.2–5.3)
SAO2 % BLD: 91 % (ref 92–97)
SERVICE CMNT-IMP: ABNORMAL
SERVICE CMNT-IMP: NORMAL
SODIUM SERPL-SCNC: 145 MMOL/L (ref 136–145)
SPECIMEN TYPE: ABNORMAL
TOTAL RESP. RATE, ITRR: 20
WBC # BLD AUTO: 8.2 K/UL (ref 4.6–13.2)

## 2020-05-02 PROCEDURE — 74011250636 HC RX REV CODE- 250/636: Performed by: INTERNAL MEDICINE

## 2020-05-02 PROCEDURE — 77010033678 HC OXYGEN DAILY

## 2020-05-02 PROCEDURE — 36600 WITHDRAWAL OF ARTERIAL BLOOD: CPT

## 2020-05-02 PROCEDURE — 74011250636 HC RX REV CODE- 250/636

## 2020-05-02 PROCEDURE — 74011250637 HC RX REV CODE- 250/637: Performed by: INTERNAL MEDICINE

## 2020-05-02 PROCEDURE — 82962 GLUCOSE BLOOD TEST: CPT

## 2020-05-02 PROCEDURE — 74011250636 HC RX REV CODE- 250/636: Performed by: FAMILY MEDICINE

## 2020-05-02 PROCEDURE — 80053 COMPREHEN METABOLIC PANEL: CPT

## 2020-05-02 PROCEDURE — 65610000006 HC RM INTENSIVE CARE

## 2020-05-02 PROCEDURE — 74011000250 HC RX REV CODE- 250: Performed by: NURSE PRACTITIONER

## 2020-05-02 PROCEDURE — 85027 COMPLETE CBC AUTOMATED: CPT

## 2020-05-02 PROCEDURE — 94660 CPAP INITIATION&MGMT: CPT

## 2020-05-02 PROCEDURE — 82803 BLOOD GASES ANY COMBINATION: CPT

## 2020-05-02 PROCEDURE — 36415 COLL VENOUS BLD VENIPUNCTURE: CPT

## 2020-05-02 RX ORDER — HYDRALAZINE HYDROCHLORIDE 20 MG/ML
10 INJECTION INTRAMUSCULAR; INTRAVENOUS EVERY 6 HOURS
Status: DISCONTINUED | OUTPATIENT
Start: 2020-05-02 | End: 2020-05-07

## 2020-05-02 RX ORDER — HYDRALAZINE HYDROCHLORIDE 20 MG/ML
10 INJECTION INTRAMUSCULAR; INTRAVENOUS
Status: DISCONTINUED | OUTPATIENT
Start: 2020-05-02 | End: 2020-05-02

## 2020-05-02 RX ORDER — HYDRALAZINE HYDROCHLORIDE 20 MG/ML
INJECTION INTRAMUSCULAR; INTRAVENOUS
Status: COMPLETED
Start: 2020-05-02 | End: 2020-05-02

## 2020-05-02 RX ORDER — CLONIDINE 0.1 MG/24H
1 PATCH, EXTENDED RELEASE TRANSDERMAL
Status: DISCONTINUED | OUTPATIENT
Start: 2020-05-02 | End: 2020-05-08 | Stop reason: HOSPADM

## 2020-05-02 RX ORDER — AMLODIPINE BESYLATE 5 MG/1
10 TABLET ORAL DAILY
Status: DISCONTINUED | OUTPATIENT
Start: 2020-05-02 | End: 2020-05-08 | Stop reason: HOSPADM

## 2020-05-02 RX ORDER — METOPROLOL TARTRATE 5 MG/5ML
2.5 INJECTION INTRAVENOUS EVERY 6 HOURS
Status: DISCONTINUED | OUTPATIENT
Start: 2020-05-02 | End: 2020-05-07

## 2020-05-02 RX ADMIN — Medication 10 ML: at 14:06

## 2020-05-02 RX ADMIN — Medication 10 ML: at 23:45

## 2020-05-02 RX ADMIN — METOPROLOL TARTRATE 2.5 MG: 5 INJECTION INTRAVENOUS at 23:43

## 2020-05-02 RX ADMIN — HYDRALAZINE HYDROCHLORIDE 10 MG: 20 INJECTION INTRAMUSCULAR; INTRAVENOUS at 17:38

## 2020-05-02 RX ADMIN — LEVOFLOXACIN 500 MG: 5 INJECTION, SOLUTION INTRAVENOUS at 07:57

## 2020-05-02 RX ADMIN — HYDRALAZINE HYDROCHLORIDE 10 MG: 20 INJECTION INTRAMUSCULAR; INTRAVENOUS at 10:36

## 2020-05-02 RX ADMIN — HEPARIN SODIUM 5000 UNITS: 5000 INJECTION INTRAVENOUS; SUBCUTANEOUS at 23:44

## 2020-05-02 RX ADMIN — HYDRALAZINE HYDROCHLORIDE 10 MG: 20 INJECTION INTRAMUSCULAR; INTRAVENOUS at 00:34

## 2020-05-02 RX ADMIN — HEPARIN SODIUM 5000 UNITS: 5000 INJECTION INTRAVENOUS; SUBCUTANEOUS at 15:00

## 2020-05-02 RX ADMIN — HEPARIN SODIUM 5000 UNITS: 5000 INJECTION INTRAVENOUS; SUBCUTANEOUS at 06:13

## 2020-05-02 RX ADMIN — Medication 10 ML: at 05:55

## 2020-05-02 RX ADMIN — METOPROLOL TARTRATE 2.5 MG: 5 INJECTION INTRAVENOUS at 20:04

## 2020-05-02 RX ADMIN — LABETALOL 20 MG/4 ML (5 MG/ML) INTRAVENOUS SYRINGE 20 MG: at 12:46

## 2020-05-02 NOTE — PROGRESS NOTES
Hospitalist Progress Note    Patient: Madalyn Miller MRN: [id number]  [facility]: [id number]    YOB: 1950  Age: 81 y.o. Sex: female    DOA: 4/27/2020 LOS:  LOS: 5 days              IMPRESSION and Plan:    Shilpa Morales is a 81 y.o. female with   Patient Active Problem List    Diagnosis Date Noted    Metabolic encephalopathy 43/20/0613    Hyperglycemia due to type 2 diabetes mellitus ([address] 04/30/2020    Acute respiratory failure with hypercapnia ([address] 04/30/2020    Hypokalemia 04/28/2020    Lung nodule 04/28/2020    CAP (community acquired pneumonia) 04/28/2020    Elevated troponin 04/27/2020    Hypertensive emergency 04/27/2020     Principal Problem:    Hypertensive emergency (4/27/2020)    Active Problems:    Elevated troponin (4/27/2020)      Hypokalemia (4/28/2020)      Lung nodule (4/28/2020)      CAP (community acquired pneumonia) (5/98/2735)      Metabolic encephalopathy (7/75/9173)      Hyperglycemia due to type 2 diabetes mellitus ([address] (4/30/2020)      Acute respiratory failure with hypercapnia ([address] (4/30/2020)            Patient's condition is         Recommend to continue hospitalization. Discussed with patient. Chief Complaints:   Chief Complaint   Patient presents with    Shortness of Breath     SUBJECTIVE:  Pt is seen and examined. No CP or SOB  No Fever, chills, Nausea, vomitting. Review of systems:    Review of Systems   Constitutional: Positive for malaise/fatigue. HENT: Negative. Eyes: Negative. Respiratory: Positive for shortness of breath. Cardiovascular: Positive for leg swelling. Negative for chest pain, palpitations and orthopnea. Gastrointestinal: Negative. Negative for abdominal pain, diarrhea and heartburn. Genitourinary: Negative for dysuria and hematuria. Skin: Negative. Neurological: Positive for weakness. Psychiatric/Behavioral: Negative for depression, substance abuse and suicidal ideas.  The patient is not nervous/anxious.         PE:  Patient Vitals for the past 24 hrs:   BP Temp Pulse Resp SpO2   05/02/20 1130  98.5 °F (36.9 °C)      05/02/20 1036 (!) 193/108  96     05/02/20 0800 (!) 188/108 99 °F (37.2 °C) 91 17 98 %   05/02/20 0757 (!) 188/108 99 °F (37.2 °C) 91 17 98 %   05/02/20 0730 (!) 191/106  93 21 97 %   05/02/20 0700 (!) 173/91 99 °F (37.2 °C) 88 21    05/02/20 0630 (!) 171/96  92 16 96 %   05/02/20 0600 160/88  90 18 97 %   05/02/20 0530 (!) 173/94  90 21 96 %   05/02/20 0500 (!) 194/105  97 21 93 %   05/02/20 0430 171/89  90 18 99 %   05/02/20 0400 168/86  91 20 98 %   05/02/20 0330 (!) 179/96  93 22 100 %   05/02/20 0320  100.1 °F (37.8 °C)      05/02/20 0300 (!) 193/104  (!) 105 23 99 %   05/02/20 0230 175/90  91 21 98 %   05/02/20 0200 (!) 175/94  91 22 98 %   05/02/20 0130 (!) 168/111  96 21 100 %   05/02/20 0100 (!) 196/92  94 21 92 %   05/02/20 0059     98 %   05/02/20 0034 (!) 187/110  90     05/02/20 0030 (!) 187/110  89 22 91 %   05/02/20 0000 (!) 191/115  96 18 98 %   05/01/20 2330 (!) 192/108  97 23 (!) 89 %   05/01/20 2328  99.4 °F (37.4 °C)      05/01/20 2300 (!) 199/106  97 25 96 %   05/01/20 2230 (!) 195/114  95 23 94 %   05/01/20 2205 (!) 205/117  92     05/01/20 2200   94 22 97 %   05/01/20 2130   94 25 92 %   05/01/20 2100   (!) 102 29 95 %   05/01/20 2030 (!) 200/107  98 26 93 %   05/01/20 2000 (!) 180/101 99.5 °F (37.5 °C) 99 20 (!) 82 %   05/01/20 1958  99.5 °F (37.5 °C)      05/01/20 1930 (!) 188/99  95 23 (!) 87 %   05/01/20 1900 (!) 186/109  99 23 99 %   05/01/20 1840 (!) 193/112  99 25 94 %   05/01/20 1800 (!) 196/112  95 24 100 %   05/01/20 1730 (!) 187/106  94 23 95 %   05/01/20 1700 (!) 176/121  96 26 93 %   05/01/20 1630 (!) 210/107  94 23 94 %   05/01/20 1600 (!) 198/110 98.6 °F (37 °C) 95 25 95 %   05/01/20 1530 (!) 190/106  96 24 96 %   05/01/20 1500 (!) 186/103  96 24 97 %   05/01/20 1430   95 21 96 % 05/01/20 1400 (!) 198/98  93 21 (!) 89 %   05/01/20 1330 (!) 179/142  (!) 101 24 95 %   05/01/20 1300 (!) 194/98  94 20 100 %       Intake/Output Summary (Last 24 hours) at 5/2/2020 1244  Last data filed at 5/2/2020 0800  Gross per 24 hour   Intake 200 ml   Output 900 ml   Net -700 ml     Patient Vitals for the past 120 hrs:   Weight   04/27/20 1624 108.9 kg (240 lb)   04/28/20 0309 125.1 kg (275 lb 14.4 oz)   04/28/20 1037 124.7 kg (275 lb)   04/28/20 1039 124.7 kg (275 lb)   04/29/20 0352 125.3 kg (276 lb 3.2 oz)         Physical Exam  Vitals signs and nursing note reviewed. Constitutional:       General: She is in acute distress. Neck:      Musculoskeletal: Normal range of motion and neck supple. Vascular: No JVD. Cardiovascular:      Rate and Rhythm: Normal rate and regular rhythm. Heart sounds: Normal heart sounds. Pulmonary:      Effort: Respiratory distress present. Breath sounds: Normal breath sounds. Abdominal:      General: Bowel sounds are normal. There is no distension. Palpations: Abdomen is soft. Tenderness: There is no abdominal tenderness. There is no rebound. Musculoskeletal: Normal range of motion. Skin:     General: Skin is warm and dry. Neurological:      Mental Status: She is alert and oriented to person, place, and time.    Psychiatric:         Mood and Affect: Affect normal.             Intake and Output:  Current Shift:  05/02 0701 - 05/02 1900  In: -   Out: 200 [Urine:200]  Last three shifts:  04/30 1901 - 05/02 0700  In: 200 [I.V.:200]  Out: 1050 [Urine:1050]    Lab/Data Reviewed:  Recent Results (from the past 8 hour(s))   CBC W/O DIFF    Collection Time: 05/02/20  5:00 AM   Result Value Ref Range    WBC 8.2 4.6 - 13.2 K/uL    RBC 5.21 4.20 - 5.30 M/uL    HGB 12.2 12.0 - 16.0 g/dL    HCT 41.4 35.0 - 45.0 %    MCV 79.5 74.0 - 97.0 FL    MCH 23.4 (L) 24.0 - 34.0 PG    MCHC 29.5 (L) 31.0 - 37.0 g/dL    RDW 16.0 (H) 11.6 - 14.5 %    PLATELET 252 785 - 420 K/uL    MPV 10.6 9.2 - 22.8 FL   METABOLIC PANEL, COMPREHENSIVE    Collection Time: 05/02/20  5:00 AM   Result Value Ref Range    Sodium 145 136 - 145 mmol/L    Potassium 4.0 3.5 - 5.5 mmol/L    Chloride 108 100 - 111 mmol/L    CO2 32 21 - 32 mmol/L    Anion gap 5 3.0 - 18 mmol/L    Glucose 100 (H) 74 - 99 mg/dL    BUN 26 (H) 7.0 - 18 MG/DL    Creatinine 0.95 0.6 - 1.3 MG/DL    BUN/Creatinine ratio 27 (H) 12 - 20      GFR est AA >60 >60 ml/min/1.73m2    GFR est non-AA 59 (L) >60 ml/min/1.73m2    Calcium 9.1 8.5 - 10.1 MG/DL    Bilirubin, total 0.5 0.2 - 1.0 MG/DL    ALT (SGPT) 21 13 - 56 U/L    AST (SGOT) 16 10 - 38 U/L    Alk. phosphatase 49 45 - 117 U/L    Protein, total 6.6 6.4 - 8.2 g/dL    Albumin 3.0 (L) 3.4 - 5.0 g/dL    Globulin 3.6 2.0 - 4.0 g/dL    A-G Ratio 0.8 0.8 - 1.7     GLUCOSE, POC    Collection Time: 05/02/20  5:23 AM   Result Value Ref Range    Glucose (POC) 98 70 - 110 mg/dL   POC G3    Collection Time: 05/02/20  8:45 AM   Result Value Ref Range    Device: NASAL CANNULA      Flow rate (POC) 2.0 L/M    FIO2 (POC) 28 %    pH (POC) 7.372 7.35 - 7.45      pCO2 (POC) 61.2 (H) 35.0 - 45.0 MMHG    pO2 (POC) 65 (L) 80 - 100 MMHG    HCO3 (POC) 35.6 (H) 22 - 26 MMOL/L    sO2 (POC) 91 (L) 92 - 97 %    Base excess (POC) 10 mmol/L    Allens test (POC) YES      Total resp.  rate 20      Site RIGHT RADIAL      Specimen type (POC) ARTERIAL      Performed by Rosalia Ramos    GLUCOSE, POC    Collection Time: 05/02/20 11:31 AM   Result Value Ref Range    Glucose (POC) 106 70 - 110 mg/dL     Medications:  Current Facility-Administered Medications   Medication Dose Route Frequency    amLODIPine (NORVASC) tablet 10 mg  10 mg Oral DAILY    losartan/hydroCHLOROthiazide (HYZAAR) 100/25 mg   Oral DAILY    hydrALAZINE (APRESOLINE) 20 mg/mL injection 10 mg  10 mg IntraVENous Q6H    cloNIDine (CATAPRES) 0.1 mg/24 hr patch 1 Patch  1 Patch TransDERmal Q7D    labetaloL (NORMODYNE;TRANDATE) 20 mg/4 mL (5 mg/mL) injection 20 mg  20 mg IntraVENous Q6H PRN    atorvastatin (LIPITOR) tablet 20 mg  20 mg Oral DAILY    albuterol-ipratropium (DUO-NEB) 2.5 MG-0.5 MG/3 ML  3 mL Nebulization Q4H PRN    insulin lispro (HUMALOG) injection   SubCUTAneous Q6H    glucose chewable tablet 16 g  4 Tab Oral PRN    glucagon (GLUCAGEN) injection 1 mg  1 mg IntraMUSCular PRN    dextrose 10% infusion 125-250 mL  125-250 mL IntraVENous PRN    ELECTROLYTE REPLACEMENT PROTOCOL - Potassium Renal Dosing  1 Each Other PRN    ELECTROLYTE REPLACEMENT PROTOCOL - Magnesium   1 Each Other PRN    ELECTROLYTE REPLACEMENT PROTOCOL  - Phosphorus Renal Dosing  1 Each Other PRN    aspirin delayed-release tablet 81 mg  81 mg Oral DAILY    heparin (porcine) injection 5,000 Units  5,000 Units SubCUTAneous Q8H    sodium chloride (NS) flush 5-40 mL  5-40 mL IntraVENous Q8H    sodium chloride (NS) flush 5-40 mL  5-40 mL IntraVENous PRN    ondansetron (ZOFRAN) injection 4 mg  4 mg IntraVENous Q4H PRN       Recent Results (from the past 24 hour(s))   GLUCOSE, POC    Collection Time: 05/01/20  5:27 PM   Result Value Ref Range    Glucose (POC) 102 70 - 110 mg/dL   GLUCOSE, POC    Collection Time: 05/01/20 11:23 PM   Result Value Ref Range    Glucose (POC) 96 70 - 110 mg/dL   CBC W/O DIFF    Collection Time: 05/02/20  5:00 AM   Result Value Ref Range    WBC 8.2 4.6 - 13.2 K/uL    RBC 5.21 4.20 - 5.30 M/uL    HGB 12.2 12.0 - 16.0 g/dL    HCT 41.4 35.0 - 45.0 %    MCV 79.5 74.0 - 97.0 FL    MCH 23.4 (L) 24.0 - 34.0 PG    MCHC 29.5 (L) 31.0 - 37.0 g/dL    RDW 16.0 (H) 11.6 - 14.5 %    PLATELET 949 889 - 863 K/uL    MPV 10.6 9.2 - 69.1 FL   METABOLIC PANEL, COMPREHENSIVE    Collection Time: 05/02/20  5:00 AM   Result Value Ref Range    Sodium 145 136 - 145 mmol/L    Potassium 4.0 3.5 - 5.5 mmol/L    Chloride 108 100 - 111 mmol/L    CO2 32 21 - 32 mmol/L    Anion gap 5 3.0 - 18 mmol/L    Glucose 100 (H) 74 - 99 mg/dL    BUN 26 (H) 7.0 - 18 MG/DL Creatinine 0.95 0.6 - 1.3 MG/DL    BUN/Creatinine ratio 27 (H) 12 - 20      GFR est AA >60 >60 ml/min/1.73m2    GFR est non-AA 59 (L) >60 ml/min/1.73m2    Calcium 9.1 8.5 - 10.1 MG/DL    Bilirubin, total 0.5 0.2 - 1.0 MG/DL    ALT (SGPT) 21 13 - 56 U/L    AST (SGOT) 16 10 - 38 U/L    Alk. phosphatase 49 45 - 117 U/L    Protein, total 6.6 6.4 - 8.2 g/dL    Albumin 3.0 (L) 3.4 - 5.0 g/dL    Globulin 3.6 2.0 - 4.0 g/dL    A-G Ratio 0.8 0.8 - 1.7     GLUCOSE, POC    Collection Time: 05/02/20  5:23 AM   Result Value Ref Range    Glucose (POC) 98 70 - 110 mg/dL   POC G3    Collection Time: 05/02/20  8:45 AM   Result Value Ref Range    Device: NASAL CANNULA      Flow rate (POC) 2.0 L/M    FIO2 (POC) 28 %    pH (POC) 7.372 7.35 - 7.45      pCO2 (POC) 61.2 (H) 35.0 - 45.0 MMHG    pO2 (POC) 65 (L) 80 - 100 MMHG    HCO3 (POC) 35.6 (H) 22 - 26 MMOL/L    sO2 (POC) 91 (L) 92 - 97 %    Base excess (POC) 10 mmol/L    Allens test (POC) YES      Total resp.  rate 20      Site RIGHT RADIAL      Specimen type (POC) ARTERIAL      Performed by Samra Fowler    GLUCOSE, POC    Collection Time: 05/02/20 11:31 AM   Result Value Ref Range    Glucose (POC) 106 70 - 110 mg/dL       Procedures/imaging: see electronic medical records for all procedures/Xrays and details which were not copied into this note but were reviewed prior to creation of Helga Varma MD   5/2/2020, 12:44 PM

## 2020-05-02 NOTE — PROGRESS NOTES
Cardiology Progress Note        Patient: Lizzeth Card Minor        Sex: female          DOA: 4/27/2020  YOB: 1950      Age:  81 y.o.        LOS:  LOS: 5 days   Assessment/Plan     Principal Problem:    Hypertensive emergency (4/27/2020)    Active Problems:    Elevated troponin (4/27/2020)      Hypokalemia (4/28/2020)      Lung nodule (4/28/2020)      CAP (community acquired pneumonia) (6/09/3807)      Metabolic encephalopathy (1/17/0439)      Hyperglycemia due to type 2 diabetes mellitus ([address] (4/30/2020)      Acute respiratory failure with hypercapnia (HCC) (4/30/2020)        Plan: sinus rhythm, RBBB. Extubated and denied CP and SOB  Permissive hypertensive   Continue with current meds                      Subjective:    cc:  Elevated trop  Stroke  HTN  encephalopathy        REVIEW OF SYSTEMS:     General: No fevers or chills. Cardiovascular: No chest pain or pressure. No palpitations. No ankle swelling  Pulmonary: No SOB, orthopnea, PND  Gastrointestinal: No nausea, vomiting or diarrhea      Objective:      Visit Vitals  BP (!) 203/107   Pulse 97   Temp 98.4 °F (36.9 °C)   Resp 21   Ht 5' 5\" (1.651 m)   Wt 125.3 kg (276 lb 3.2 oz)   SpO2 93%   BMI 45.96 kg/m²     Body mass index is 45.96 kg/m². Physical Exam:  General Appearance: Comfortable, not using accessory muscles of respiration. NECK: No JVD, no thyroidomeglay. LUNGS: Clear bilaterally. HEART: S1+S2 audible,    ABD: Non-tender, BS Audible    EXT: No edema, and no cysnosis. VASCULAR EXAM: Pulses are intact. PSYCHIATRIC EXAM: Mood is appropriate.     Medication:  Current Facility-Administered Medications   Medication Dose Route Frequency    [Held by provider] amLODIPine (NORVASC) tablet 10 mg  10 mg Oral DAILY    [Held by provider] losartan/hydroCHLOROthiazide (HYZAAR) 100/25 mg   Oral DAILY    hydrALAZINE (APRESOLINE) 20 mg/mL injection 10 mg  10 mg IntraVENous Q6H    cloNIDine (CATAPRES) 0.1 mg/24 hr patch 1 Patch  1 Patch TransDERmal Q7D    labetaloL (NORMODYNE;TRANDATE) 20 mg/4 mL (5 mg/mL) injection 20 mg  20 mg IntraVENous Q6H PRN    atorvastatin (LIPITOR) tablet 20 mg  20 mg Oral DAILY    albuterol-ipratropium (DUO-NEB) 2.5 MG-0.5 MG/3 ML  3 mL Nebulization Q4H PRN    insulin lispro (HUMALOG) injection   SubCUTAneous Q6H    glucose chewable tablet 16 g  4 Tab Oral PRN    glucagon (GLUCAGEN) injection 1 mg  1 mg IntraMUSCular PRN    dextrose 10% infusion 125-250 mL  125-250 mL IntraVENous PRN    ELECTROLYTE REPLACEMENT PROTOCOL - Potassium Renal Dosing  1 Each Other PRN    ELECTROLYTE REPLACEMENT PROTOCOL - Magnesium   1 Each Other PRN    ELECTROLYTE REPLACEMENT PROTOCOL  - Phosphorus Renal Dosing  1 Each Other PRN    aspirin delayed-release tablet 81 mg  81 mg Oral DAILY    heparin (porcine) injection 5,000 Units  5,000 Units SubCUTAneous Q8H    sodium chloride (NS) flush 5-40 mL  5-40 mL IntraVENous Q8H    sodium chloride (NS) flush 5-40 mL  5-40 mL IntraVENous PRN    ondansetron (ZOFRAN) injection 4 mg  4 mg IntraVENous Q4H PRN               Lab/Data Reviewed:  Procedures/imaging: see electronic medical records for all procedures/Xrays   and details which were not copied into this note but were reviewed prior to creation of Plan       All lab results for the last 24 hours reviewed. Recent Labs     05/02/20  0500 05/01/20  0419 04/30/20  0500   WBC 8.2 6.6 6.6   HGB 12.2 11.6* 11.8*   HCT 41.4 39.7 39.9    321 354     Recent Labs     05/02/20  0500 05/01/20  1218 05/01/20  0419  04/30/20  0500     --  144  --  142   K 4.0 3.7 3.6   < > 3.3*     --  107  --  104   CO2 32  --  33*  --  33*   *  --  110*  --  132*   BUN 26*  --  27*  --  27*   CREA 0.95  --  1.02  --  1.10   CA 9.1  --  8.7  --  8.5    < > = values in this interval not displayed.        Signed By: Annetta Naidu MD     May 2, 2020

## 2020-05-02 NOTE — ROUTINE PROCESS
Bedside shift change report given to [address] [place] [address] Street (oncoming nurse) by Abbey Daniels (offgoing nurse). Report included the following information SBAR, Kardex, ED Summary, MAR, Accordion, Recent Results, Med Rec Status, Cardiac Rhythm NSR, BBB and Quality Measures.

## 2020-05-02 NOTE — PROGRESS NOTES
Problem: Hypertension  Goal: *Blood pressure within specified parameters  Outcome: Progressing Towards Goal     Problem: Hypertension  Goal: *Labs within defined limits  Outcome: Progressing Towards Goal     Problem: Hypertension  Goal: *Fluid volume balance  Outcome: Progressing Towards Goal     Problem: General Medical Care Plan  Goal: *Vital signs within specified parameters  Outcome: Progressing Towards Goal     Problem: Patient Education: Go to Patient Education Activity  Goal: Patient/Family Education  Outcome: Progressing Towards Goal     Problem: Pressure Injury - Risk of  Goal: *Prevention of pressure injury  Description: Document Carlos Scale and appropriate interventions in the flowsheet. Outcome: Progressing Towards Goal  Note: Pressure Injury Interventions:  Sensory Interventions: Assess changes in LOC, Assess need for specialty bed, Check visual cues for pain, Discuss PT/OT consult with provider, Keep linens dry and wrinkle-free, Maintain/enhance activity level, Minimize linen layers, Monitor skin under medical devices, Turn and reposition approx.  every two hours (pillows and wedges if needed)         Activity Interventions: Assess need for specialty bed, Pressure redistribution bed/mattress(bed type), PT/OT evaluation    Mobility Interventions: Assess need for specialty bed, Pressure redistribution bed/mattress (bed type), HOB 30 degrees or less    Nutrition Interventions: Document food/fluid/supplement intake, Discuss nutritional consult with provider    Friction and Shear Interventions: Apply protective barrier, creams and emollients, Feet elevated on foot rest, HOB 30 degrees or less, Lift sheet

## 2020-05-02 NOTE — PROGRESS NOTES
[facility]  Pulmonary, Critical Care, and Sleep Medicine    Name: Holly Rodrigues MRN: [id number]   : 1950 Hospital: [facility] [place]   Date: 2020        Pulmonary Critical Care Note    IMPRESSION:   Patient Active Problem List   Diagnosis Code    Acute on chronic hypercapnic hypoxic respiratory failure J96.21, J96.22    CVA I63.9    Encephalopathy, 2' to CVA G93.40    Hypertension I10    Pulmonary edema J81.1    CAP (community acquired pneumonia) J18.9    Possible RACHEL or OHS E66.2    Elevated troponin R79.89    CKD stage 3 N18.3    Hypokalemia, improved E87.6    Lung nodule R91.1    Morbid obesity       RECOMMENDATIONS:   BiPAP at night with sleep- adjust settings per goal SPO2 91-95%  Able to protect airways. CXR for follow up showed no major infiltrates  Patient has baseline chronic compensated hypercapnia  Supplemental O2 and titrate to NC, titrate flow for goal SPO2 91-95%  Aspiration prevention bundle, head of the bed at 30' all times  Bronchodilators PRN, pulmonary hygiene care  Antibiotic choice: finished Levofloxacin 20. Follow UCx negative final  Monitor BP; start Hydralazine IV, Clonidine patch; PRN Labetalol for BP > 160/100 mm Hg  Cardiology following  Glycemic control  MRI, EEG today; appreciate neurological evaluation  CT head limited but nil acute  AM labs. Diet NPO. Appreciate swallow evaluation  Difficult to appreciate status of Right lung nodule between  and  CT chest with atelectasis. May consider short term outpatient follow up as per 20 CT chest report  Patient may benefit from sleep center based sleep studies  Schmitz Bundle Followed  Will defer respective systems problem management to primary and other consultant and follow patient while in ICU with primary and other medical team  Further recommendations will be based on the patient's response to recommended treatment and results of the investigation ordered.   Quality Care: PPI, DVT prophylaxis, HOB elevated, Infection control all reviewed and addressed. PAIN AND SEDATION: avoid any sedatives, hypnotics  · Skin/Wound: chronic venous stasis changes  · Nutrition: NPO  · Prophylaxis: DVT and GI Prophylaxis reviewed. · PT/OT eval and treat: when stable   · Lines/Tubes: lines GRACIA; zaragoza 4/29/20  ADVANCE DIRECTIVE: Full code  DISCUSSION: spoke with RN, RT, patient  Events and notes from last 24 hours reviewed. Care plan discussed with nursing      Subjective/History:   Ms. Ngoc Morales has been seen and evaluated as Dr. Juan David Nuñez requested now for Acute on chronic hypercapnic hypoxic respiratory failure. Patient is a 81 y.o. female with pmhx for hypertension, DDD, obesity presents to the ED with SOB and MO admitted for hypertensive emergency, elevated troponin, lung nodule and abnormal chest xray treated for PNA, pulmonary edema. Her hospital course was complicated today by finding of change in mental status, somnolence; would awake and answer a simple question and then go back to sleep. ABG showed acute on chronic hypercapnia, C02 71, P02 117, ph 7.302. Stat CTA Chest no PE, CT head nil acute. Code Stroke was called; tele neurologist suspected more global changes. As per discussion with other providers, AAO x 3 yesterday. Non smoker and no hx of COPD, known RACHEL. This patient was discussed in detail with Goldie Hassan, Marjorie, addressed any possible COVID19 symptoms such as fever, cough, confirmed or possible contacts and any other pertinent signs, symptoms and no such findings or risks in this patient for Novel Coronavirus. Other information is limited. 05/02/20  Patient used BiPAP overnight; on NC O2 and SPO2 99%  awake, lethargic, O x 1 -place, follows commands, verbal  Hemodynamically stable; HTN requiring intervention  No fever. UO fair. NPO given lethargy.  MRI yesterday showed CVA  I was not contacted by any staff for anything on patient overnight    Review of Systems:  Review of systems not obtained due to patient factors. Past Medical History:  Past Medical History:   Diagnosis Date    Hypertension         Past Surgical History:  Past Surgical History:   Procedure Laterality Date    HX HIP REPLACEMENT      left hip        Medications:  Prior to Admission medications    Medication Sig Start Date End Date Taking? Authorizing Provider   traMADol (ULTRAM) 50 mg tablet Take 1 Tab by mouth every six (6) hours as needed for Pain. Max Daily Amount: 200 mg. 8/1/16   JOSEFINA Rooney   [facility] RW 1/4/14   Allyssa Vasques DO   losartan 50 mg tab 100 mg, hydrochlorothiazide 25 mg tab 25 mg Take  by mouth daily. Kennedy Dang MD   amLODIPine (NORVASC) 10 mg tablet Take  by mouth daily. Kennedy Dang MD   meloxicam (MOBIC) 15 mg tablet Take 15 mg by mouth daily. Kennedy aDng MD       Current Facility-Administered Medications   Medication Dose Route Frequency    amLODIPine (NORVASC) tablet 10 mg  10 mg Oral DAILY    losartan/hydroCHLOROthiazide (HYZAAR) 100/25 mg   Oral DAILY    hydrALAZINE (APRESOLINE) 20 mg/mL injection 10 mg  10 mg IntraVENous Q6H    cloNIDine (CATAPRES) 0.1 mg/24 hr patch 1 Patch  1 Patch TransDERmal Q7D    atorvastatin (LIPITOR) tablet 20 mg  20 mg Oral DAILY    insulin lispro (HUMALOG) injection   SubCUTAneous Q6H    aspirin delayed-release tablet 81 mg  81 mg Oral DAILY    heparin (porcine) injection 5,000 Units  5,000 Units SubCUTAneous Q8H    sodium chloride (NS) flush 5-40 mL  5-40 mL IntraVENous Q8H       Allergy:  Allergies   Allergen Reactions    Rocephin [Ceftriaxone] Other (comments)     Flushing        Social History:  Social History     Tobacco Use    Smoking status: Never Smoker    Smokeless tobacco: Never Used   Substance Use Topics    Alcohol use: No    Drug use: No        Family History:  History reviewed. No pertinent family history.        Objective:   Vital Signs:    Blood pressure (!) 188/108, pulse 91, temperature 99 °F (37.2 °C), resp. rate 17, height 5' 5\" (1.651 m), weight 125.3 kg (276 lb 3.2 oz), SpO2 98 %. Body mass index is 45.96 kg/m². O2 Device: BIPAP   O2 Flow Rate (L/min): 20 l/min   Temp (24hrs), Av.2 °F (37.3 °C), Min:98.6 °F (37 °C), Max:100.1 °F (37.8 °C)         Intake/Output:   Last shift:      701 - 1900  In: -   Out: 200 [Urine:200]  Last 3 shifts: 1901 -  07  In: 200 [I.V.:200]  Out: 1050 [Urine:1050]    Intake/Output Summary (Last 24 hours) at 2020 1030  Last data filed at 2020 0800  Gross per 24 hour   Intake 200 ml   Output 900 ml   Net -700 ml       Physical Exam:  General: lethargic, in no distress, protecting airways, appears older than stated age, morbidly obese, on NC O2  HEENT: PERRL, throat normal without erythema or exudate  Neck: No abnormally enlarged lymph nodes or thyroid, supple  Chest: normal, obese chest wall  Lungs: moderate air entry, few rhonchi scattered bilaterally, normal percussion anterior chest bilaterally, no tenderness/ rash, exam limitations  Heart: Regular rate and rhythm, S1S2 present or without murmur or extra heart sounds  Abdomen: obese, bowel sounds normoactive, tympanic, soft without significant tenderness, masses, organomegaly or guarding, rigidity, rebound  Extremity: no bl LE edema; no cyanosis, clubbing  Capillary refill: normal  Neuro: lethargic, follows simple commands, tracking in room, moves all extremities well, no involuntary movements, exam limitations  Skin: Skin color, texture, turgor fair.  Skin dry, warm, non-diaphoretic    Data:     Recent Results (from the past 24 hour(s))   POTASSIUM    Collection Time: 20 12:18 PM   Result Value Ref Range    Potassium 3.7 3.5 - 5.5 mmol/L   GLUCOSE, POC    Collection Time: 20 12:29 PM   Result Value Ref Range    Glucose (POC) 102 70 - 110 mg/dL   GLUCOSE, POC    Collection Time: 20  5:27 PM   Result Value Ref Range    Glucose (POC) 102 70 - 110 mg/dL GLUCOSE, POC    Collection Time: 05/01/20 11:23 PM   Result Value Ref Range    Glucose (POC) 96 70 - 110 mg/dL   CBC W/O DIFF    Collection Time: 05/02/20  5:00 AM   Result Value Ref Range    WBC 8.2 4.6 - 13.2 K/uL    RBC 5.21 4.20 - 5.30 M/uL    HGB 12.2 12.0 - 16.0 g/dL    HCT 41.4 35.0 - 45.0 %    MCV 79.5 74.0 - 97.0 FL    MCH 23.4 (L) 24.0 - 34.0 PG    MCHC 29.5 (L) 31.0 - 37.0 g/dL    RDW 16.0 (H) 11.6 - 14.5 %    PLATELET 608 953 - 082 K/uL    MPV 10.6 9.2 - 76.0 FL   METABOLIC PANEL, COMPREHENSIVE    Collection Time: 05/02/20  5:00 AM   Result Value Ref Range    Sodium 145 136 - 145 mmol/L    Potassium 4.0 3.5 - 5.5 mmol/L    Chloride 108 100 - 111 mmol/L    CO2 32 21 - 32 mmol/L    Anion gap 5 3.0 - 18 mmol/L    Glucose 100 (H) 74 - 99 mg/dL    BUN 26 (H) 7.0 - 18 MG/DL    Creatinine 0.95 0.6 - 1.3 MG/DL    BUN/Creatinine ratio 27 (H) 12 - 20      GFR est AA >60 >60 ml/min/1.73m2    GFR est non-AA 59 (L) >60 ml/min/1.73m2    Calcium 9.1 8.5 - 10.1 MG/DL    Bilirubin, total 0.5 0.2 - 1.0 MG/DL    ALT (SGPT) 21 13 - 56 U/L    AST (SGOT) 16 10 - 38 U/L    Alk. phosphatase 49 45 - 117 U/L    Protein, total 6.6 6.4 - 8.2 g/dL    Albumin 3.0 (L) 3.4 - 5.0 g/dL    Globulin 3.6 2.0 - 4.0 g/dL    A-G Ratio 0.8 0.8 - 1.7     GLUCOSE, POC    Collection Time: 05/02/20  5:23 AM   Result Value Ref Range    Glucose (POC) 98 70 - 110 mg/dL   POC G3    Collection Time: 05/02/20  8:45 AM   Result Value Ref Range    Device: NASAL CANNULA      Flow rate (POC) 2.0 L/M    FIO2 (POC) 28 %    pH (POC) 7.372 7.35 - 7.45      pCO2 (POC) 61.2 (H) 35.0 - 45.0 MMHG    pO2 (POC) 65 (L) 80 - 100 MMHG    HCO3 (POC) 35.6 (H) 22 - 26 MMOL/L    sO2 (POC) 91 (L) 92 - 97 %    Base excess (POC) 10 mmol/L    Allens test (POC) YES      Total resp.  rate 20      Site RIGHT RADIAL      Specimen type (POC) ARTERIAL      Performed by Farida Barkley            Recent Labs     05/02/20  0845 05/01/20  0336 04/30/20  0517   FIO2I 28 0.26 0.26 HCO3I 35.6* 34.5* 35.3*   PCO2I 61.2* 60.8* 65.7*   PHI 7.372 7.362 7.339*   PO2I 65* 79* 65*       All Micro Results     Procedure Component Value Units Date/Time    CULTURE, URINE [[id number]] Collected:  04/30/20 1440    Order Status:  Completed Specimen:  Urine from Clean catch Updated:  05/02/20 0819     Special Requests: NO SPECIAL REQUESTS        Culture result: No growth (<1,000 CFU/ML)       CULTURE, URINE [[id number]] Collected:  04/29/20 1330    Order Status:  Completed Specimen:  Cath Urine Updated:  04/30/20 1832     Special Requests: NO SPECIAL REQUESTS        Culture result: No growth (<1,000 CFU/ML)             Telemetry: normal sinus rhythm    4/28/20 ECHO  · Left Ventricle: Normal cavity size, wall thickness and systolic function (ejection fraction normal). The estimated ejection fraction is 55 - 60%. There is mild (grade 1) left ventricular diastolic dysfunction E'A ratio= 0.90. · Pulmonary Artery: Pulmonary arteries not well visualized. Pulmonary arterial systolic pressure (PASP) is 31 mmHg. Pulmonary hypertension found to be mild. Imaging:  [x]I have personally reviewed the patients chest radiographs images and report   5/1/20  Results from Hospital Encounter encounter on 04/27/20   XR CHEST PORT    Narrative AP portable chest, 5/1/2020 at 0445 hours:    INDICATION: Shortness of breath and congestive heart failure. Hypoinflation with elevation right hemidiaphragm. Top normal heart size. Mild  thoracic aortic ectasia. No new infiltrate or definitive acute congestive heart  failure. Impression IMPRESSION:  No definite interval change. Results from [place] encounter on 04/27/20   CT HEAD WO CONT    Narrative EXAM: CT HEAD WO CONT    CLINICAL INDICATION/HISTORY: change in mental status    COMPARISON: April 29, 2020    TECHNIQUE: Axial CT imaging of the head was performed without intravenous  contrast. Sagittal and coronal reconstructions are provided.  One or more dose  reduction techniques were used on this CT: automated exposure control,  adjustment of the mAs and/or kVp according to patient size, and iterative  reconstruction techniques. The specific techniques used on this CT exam have  been documented in the patient's electronic medical record. Digital Imaging and  Communications in Medicine (DICOM) format image data are available to  nonaffiliated external healthcare facilities or entities on a secure, media  free, reciprocally searchable basis with patient authorization for at least a  12-month period after this study      _______________    FINDINGS: Image quality degraded by moderate to severe motion artifact. BRAIN AND POSTERIOR FOSSA: The sulci, folia, ventricles and basal cisterns are  within normal limits for the patient's age. There is no intracranial hemorrhage,  mass effect, or midline shift. There are no areas of abnormal parenchymal  attenuation. EXTRA-AXIAL SPACES AND MENINGES: There are no abnormal extra-axial fluid  collections. CALVARIUM: Intact. SINUSES: Clear. OTHER: None.    _______________      Impression IMPRESSION:    Limited study secondary to motion. No definite acute intracranial abnormalities. [x]See my orders for details    My assessment, plan of care, findings, medications, side effects etc were discussed with:  [x]nursing []PT/OT    [x]respiratory therapy []Dr. Tha Pickard   []family [x]Patient     [x]Total critical care time exclusive of procedures 36 minutes with complex decision making performed and > 50% time spent in face to face evaluation.     Franci Bender MD

## 2020-05-02 NOTE — ROUTINE PROCESS
Bedside shift change report received from  Carlo Solis (offgoing nurse). Report included the following information:SBAR, MAR, Cardiac Rhythm NSR. Patient is awake, alert and oriented x2. Flacc Score 0. Assume care of patient at this time. Careplan and chart reviewed. PAtient on Bipap, appears comfortable, call bell in reach.

## 2020-05-02 NOTE — PROGRESS NOTES
Cardiology Progress Note        Patient: Lili Cruz Minor        Sex: female          DOA: 4/27/2020  YOB: 1950      Age:  81 y.o.        LOS:  LOS: 4 days    Patient seen and examined, chart reviewed. Assessment/Plan     Patient Active Problem List   Diagnosis Code    Elevated troponin R79.89    Hypertensive emergency I16.1    Hypokalemia E87.6    Lung nodule R91.1    CAP (community acquired pneumonia) H94.4    Metabolic encephalopathy N73.40    Hyperglycemia due to type 2 diabetes mellitus ([facility] [address] E11.65    Acute respiratory failure with hypercapnia (HCC) J96.02      Acute hypercapnic and hypoxic respiratory failure  Altered mental status  Shortness of breath on exertion   Abnormal troponin: No clear evidence of ACS, due to demand ischemia   Abnormal EKG        Echocardiogram revealed Left Ventricle: Normal cavity size, wall thickness and systolic function (ejection fraction normal). The estimated ejection fraction is 55 - 60%. There is mild (grade 1) left ventricular diastolic dysfunction E'A ratio= 0.90. Pulmonary Artery: Pulmonary arteries not well visualized. Pulmonary arterial systolic pressure (PASP) is 31 mmHg. Pulmonary hypertension found to be mild. MRI reported   1. The study is markedly limited by patient motion. If symptoms persist, repeat is recommended when the patient is more able to tolerate or can be sedated. 2. Multiple bilateral hemispheric areas of diffusion abnormality in large part restricted diffusion. The pattern would be more consistent with multiple infarctions most likely embolic more so than permanent brain injury from hypertensive encephalopathy. No hemorrhage or definitive mass effect.     Plan:    Continue Aspirin 81 mg once a day,   IV Labetalol 20 mg q 6 hr PRN for hypertension. Continue management as per hospital medicine  Plan discussed with Lynn Weiner and patient's nurse.               Subjective:    cc:  Confused REVIEW OF SYSTEMS:     Can not obtain     Objective:      Visit Vitals  BP (!) 205/117   Pulse 92   Temp 99.4 °F (37.4 °C)   Resp 25   Ht 5' 5\" (1.651 m)   Wt 125.3 kg (276 lb 3.2 oz)   SpO2 94%   BMI 45.96 kg/m²     Body mass index is 45.96 kg/m². Physical Exam:  General Appearance: Comfortable, In mild respiratory distress  HEENT: ROSENDO. HEAD: Atraumatic  NECK: No JVD, no thyroidomeglay. CAROTIDS: No bruit  LUNGS: Clear bilaterally. HEART: S1+S2 audible, no murmur, no pericardial rub.     ABD: Non-tender, BS Audible    NEUROLOGICAL: Confused, not following verbal commands  Medication:  Current Facility-Administered Medications   Medication Dose Route Frequency    labetaloL (NORMODYNE;TRANDATE) 20 mg/4 mL (5 mg/mL) injection 20 mg  20 mg IntraVENous Q6H PRN    atorvastatin (LIPITOR) tablet 20 mg  20 mg Oral DAILY    albuterol-ipratropium (DUO-NEB) 2.5 MG-0.5 MG/3 ML  3 mL Nebulization Q4H PRN    insulin lispro (HUMALOG) injection   SubCUTAneous Q6H    glucose chewable tablet 16 g  4 Tab Oral PRN    glucagon (GLUCAGEN) injection 1 mg  1 mg IntraMUSCular PRN    dextrose 10% infusion 125-250 mL  125-250 mL IntraVENous PRN    ELECTROLYTE REPLACEMENT PROTOCOL - Potassium Renal Dosing  1 Each Other PRN    ELECTROLYTE REPLACEMENT PROTOCOL - Magnesium   1 Each Other PRN    ELECTROLYTE REPLACEMENT PROTOCOL  - Phosphorus Renal Dosing  1 Each Other PRN    levoFLOXacin (LEVAQUIN) 500 mg in D5W IVPB  500 mg IntraVENous Q24H    aspirin delayed-release tablet 81 mg  81 mg Oral DAILY    heparin (porcine) injection 5,000 Units  5,000 Units SubCUTAneous Q8H    sodium chloride (NS) flush 5-40 mL  5-40 mL IntraVENous Q8H    sodium chloride (NS) flush 5-40 mL  5-40 mL IntraVENous PRN    ondansetron (ZOFRAN) injection 4 mg  4 mg IntraVENous Q4H PRN               Lab/Data Reviewed:       Recent Labs     05/01/20  0419 04/30/20  0500 04/29/20  0930   WBC 6.6 6.6 7.3   HGB 11.6* 11.8* 11.8*   HCT 39.7 39.9 40.0  354 346     Recent Labs     05/01/20  1218 05/01/20  0419 04/30/20  1902 04/30/20  0500 04/29/20  0930   NA  --  144  --  142 140   K 3.7 3.6 3.5 3.3* 3.6   CL  --  107  --  104 102   CO2  --  33*  --  33* 33*   GLU  --  110*  --  132* 131*   BUN  --  27*  --  27* 26*   CREA  --  1.02  --  1.10 1.23   CA  --  8.7  --  8.5 8.2*     32 minutes of critical care time spent in the direct evaluation and treatment of this high risk patient. The reason for providing this level of medical care for this critically ill patient was due a critical illness that impaired one or more vital organ systems such that there was a high probability of imminent or life threatening deterioration in the patients condition. This care involved high complexity decision making to assess, manipulate, and support vital system functions, to treat this degree vital organ system failure and to prevent further life threatening deterioration of the patients condition.   Signed By: Vivien Hidalgo MD     May 1, 2020

## 2020-05-03 LAB
ALBUMIN SERPL-MCNC: 3 G/DL (ref 3.4–5)
ALBUMIN/GLOB SERPL: 0.8 {RATIO} (ref 0.8–1.7)
ALP SERPL-CCNC: 47 U/L (ref 45–117)
ALT SERPL-CCNC: 18 U/L (ref 13–56)
ANION GAP SERPL CALC-SCNC: 6 MMOL/L (ref 3–18)
AST SERPL-CCNC: 16 U/L (ref 10–38)
BILIRUB SERPL-MCNC: 0.5 MG/DL (ref 0.2–1)
BUN SERPL-MCNC: 31 MG/DL (ref 7–18)
BUN/CREAT SERPL: 31 (ref 12–20)
CALCIUM SERPL-MCNC: 9.2 MG/DL (ref 8.5–10.1)
CHLORIDE SERPL-SCNC: 109 MMOL/L (ref 100–111)
CO2 SERPL-SCNC: 32 MMOL/L (ref 21–32)
CREAT SERPL-MCNC: 0.99 MG/DL (ref 0.6–1.3)
ERYTHROCYTE [DISTWIDTH] IN BLOOD BY AUTOMATED COUNT: 16.3 % (ref 11.6–14.5)
GLOBULIN SER CALC-MCNC: 3.7 G/DL (ref 2–4)
GLUCOSE BLD STRIP.AUTO-MCNC: 106 MG/DL (ref 70–110)
GLUCOSE BLD STRIP.AUTO-MCNC: 113 MG/DL (ref 70–110)
GLUCOSE BLD STRIP.AUTO-MCNC: 127 MG/DL (ref 70–110)
GLUCOSE BLD STRIP.AUTO-MCNC: 98 MG/DL (ref 70–110)
GLUCOSE SERPL-MCNC: 106 MG/DL (ref 74–99)
HCT VFR BLD AUTO: 41.9 % (ref 35–45)
HGB BLD-MCNC: 12.4 G/DL (ref 12–16)
MCH RBC QN AUTO: 23.4 PG (ref 24–34)
MCHC RBC AUTO-ENTMCNC: 29.6 G/DL (ref 31–37)
MCV RBC AUTO: 79.1 FL (ref 74–97)
PLATELET # BLD AUTO: 362 K/UL (ref 135–420)
PMV BLD AUTO: 10.6 FL (ref 9.2–11.8)
POTASSIUM SERPL-SCNC: 4 MMOL/L (ref 3.5–5.5)
PROT SERPL-MCNC: 6.7 G/DL (ref 6.4–8.2)
RBC # BLD AUTO: 5.3 M/UL (ref 4.2–5.3)
SODIUM SERPL-SCNC: 147 MMOL/L (ref 136–145)
WBC # BLD AUTO: 7.9 K/UL (ref 4.6–13.2)

## 2020-05-03 PROCEDURE — 36415 COLL VENOUS BLD VENIPUNCTURE: CPT

## 2020-05-03 PROCEDURE — 94660 CPAP INITIATION&MGMT: CPT

## 2020-05-03 PROCEDURE — 74011000250 HC RX REV CODE- 250: Performed by: NURSE PRACTITIONER

## 2020-05-03 PROCEDURE — 97167 OT EVAL HIGH COMPLEX 60 MIN: CPT

## 2020-05-03 PROCEDURE — 77010033678 HC OXYGEN DAILY

## 2020-05-03 PROCEDURE — 74011250637 HC RX REV CODE- 250/637: Performed by: INTERNAL MEDICINE

## 2020-05-03 PROCEDURE — 74011250636 HC RX REV CODE- 250/636: Performed by: INTERNAL MEDICINE

## 2020-05-03 PROCEDURE — 97163 PT EVAL HIGH COMPLEX 45 MIN: CPT

## 2020-05-03 PROCEDURE — 97535 SELF CARE MNGMENT TRAINING: CPT

## 2020-05-03 PROCEDURE — 82962 GLUCOSE BLOOD TEST: CPT

## 2020-05-03 PROCEDURE — 65270000029 HC RM PRIVATE

## 2020-05-03 PROCEDURE — 80053 COMPREHEN METABOLIC PANEL: CPT

## 2020-05-03 PROCEDURE — 74011250636 HC RX REV CODE- 250/636: Performed by: FAMILY MEDICINE

## 2020-05-03 PROCEDURE — 97162 PT EVAL MOD COMPLEX 30 MIN: CPT

## 2020-05-03 PROCEDURE — 74011250637 HC RX REV CODE- 250/637: Performed by: PSYCHIATRY & NEUROLOGY

## 2020-05-03 PROCEDURE — 97530 THERAPEUTIC ACTIVITIES: CPT

## 2020-05-03 PROCEDURE — 85027 COMPLETE CBC AUTOMATED: CPT

## 2020-05-03 RX ADMIN — METOPROLOL TARTRATE 2.5 MG: 5 INJECTION INTRAVENOUS at 19:01

## 2020-05-03 RX ADMIN — METOPROLOL TARTRATE 2.5 MG: 5 INJECTION INTRAVENOUS at 13:06

## 2020-05-03 RX ADMIN — HEPARIN SODIUM 5000 UNITS: 5000 INJECTION INTRAVENOUS; SUBCUTANEOUS at 16:17

## 2020-05-03 RX ADMIN — HYDRALAZINE HYDROCHLORIDE 10 MG: 20 INJECTION INTRAMUSCULAR; INTRAVENOUS at 18:38

## 2020-05-03 RX ADMIN — Medication 10 ML: at 16:17

## 2020-05-03 RX ADMIN — HYDRALAZINE HYDROCHLORIDE 10 MG: 20 INJECTION INTRAMUSCULAR; INTRAVENOUS at 13:06

## 2020-05-03 RX ADMIN — HYDRALAZINE HYDROCHLORIDE 10 MG: 20 INJECTION INTRAMUSCULAR; INTRAVENOUS at 06:24

## 2020-05-03 RX ADMIN — LABETALOL 20 MG/4 ML (5 MG/ML) INTRAVENOUS SYRINGE 20 MG: at 16:17

## 2020-05-03 RX ADMIN — ASPIRIN 81 MG: 81 TABLET, COATED ORAL at 09:29

## 2020-05-03 RX ADMIN — LOSARTAN POTASSIUM: 50 TABLET, FILM COATED ORAL at 09:38

## 2020-05-03 RX ADMIN — HEPARIN SODIUM 5000 UNITS: 5000 INJECTION INTRAVENOUS; SUBCUTANEOUS at 06:25

## 2020-05-03 RX ADMIN — Medication 10 ML: at 06:31

## 2020-05-03 RX ADMIN — AMLODIPINE BESYLATE 10 MG: 5 TABLET ORAL at 09:38

## 2020-05-03 RX ADMIN — METOPROLOL TARTRATE 2.5 MG: 5 INJECTION INTRAVENOUS at 06:24

## 2020-05-03 RX ADMIN — ATORVASTATIN CALCIUM 20 MG: 20 TABLET, FILM COATED ORAL at 09:28

## 2020-05-03 NOTE — PROGRESS NOTES
[facility]  Pulmonary, Critical Care, and Sleep Medicine    Name: Carine Larson MRN: [id number]   : 1950 Hospital: [facility] [place]   Date: 5/3/2020        Pulmonary Critical Care Note    IMPRESSION:   Patient Active Problem List   Diagnosis Code    Acute on chronic hypercapnic hypoxic respiratory failure J96.21, J96.22    CVA I63.9    Encephalopathy, 2' to CVA G93.40    Hypertension I10    Pulmonary edema J81.1    CAP (community acquired pneumonia) J18.9    Possible RACHEL or OHS E66.2    Elevated troponin R79.89    CKD stage 3 N18.3    Hypokalemia, improved E87.6    Lung nodule R91.1    Morbid obesity       RECOMMENDATIONS:   BiPAP at night with sleep- adjust settings per goal SPO2 91-95%  CXR for follow up showed no major infiltrates  Patient has baseline chronic compensated hypercapnia  Supplemental O2 and titrate NC flow, titrate flow for goal SPO2 91-95%  Aspiration prevention bundle, head of the bed at 30' all times  Bronchodilators PRN, pulmonary hygiene care  Antibiotic choice: finished Levofloxacin 20. UCx negative final  Monitor BP; restart PO meds  Hydralazine IV, Clonidine patch; PRN Labetalol for BP > 160/100 mm Hg  Cardiology following  Glycemic control  Appreciate neurological evaluation  CT head limited but nil acute. MRI with CVA  AM labs. Diet NPO but okay to have PO meds. Appreciate swallow evaluation - reaccess in AM for diet recommendations  Difficult to appreciate status of Right lung nodule between  and  CT chest with atelectasis.  May consider short term outpatient follow up as per 20 CT chest report  Patient may benefit from sleep center based sleep studies  Nadir Bundle Followed  Will defer respective systems problem management to primary and other consultant and follow patient while in ICU with primary and other medical team  Further recommendations will be based on the patient's response to recommended treatment and results of the investigation ordered. Quality Care: PPI, DVT prophylaxis, HOB elevated, Infection control all reviewed and addressed. PAIN AND SEDATION: avoid any sedatives, hypnotics  · Skin/Wound: chronic venous stasis changes  · Nutrition: NPO  · Prophylaxis: DVT and GI Prophylaxis reviewed. · PT/OT eval and treat: when stable   · Lines/Tubes: lines PIV; zaragoza 4/29/20  ADVANCE DIRECTIVE: Full code  DISCUSSION: spoke with RN, RT, patient  Events and notes from last 24 hours reviewed. Care plan discussed with nursing      Subjective/History:   Ms. Don Morales has been seen and evaluated as Dr. Deborah Sicard requested now for Acute on chronic hypercapnic hypoxic respiratory failure. Patient is a 81 y.o. female with pmhx for hypertension, DDD, obesity presents to the ED with SOB and MO admitted for hypertensive emergency, elevated troponin, lung nodule and abnormal chest xray treated for PNA, pulmonary edema. Her hospital course was complicated today by finding of change in mental status, somnolence; would awake and answer a simple question and then go back to sleep. ABG showed acute on chronic hypercapnia, C02 71, P02 117, ph 7.302. Stat CTA Chest no PE, CT head nil acute. Code Stroke was called; tele neurologist suspected more global changes. As per discussion with other providers, AAO x 3 yesterday. Non smoker and no hx of COPD, known RACHEL. This patient was discussed in detail with Goldie Hassan, Marjorie, addressed any possible COVID19 symptoms such as fever, cough, confirmed or possible contacts and any other pertinent signs, symptoms and no such findings or risks in this patient for Novel Coronavirus. Other information is limited. 05/03/20  Patient used BiPAP overnight; on NC O2 and SPO2 99%  Awake, O x 3, follows commands, verbal  Hemodynamically stable; HTN requiring intervention  No fever. UO fair.  NPO  I was not contacted by any staff for anything on patient overnight    Review of Systems:  General ROS: negative for  - fever, chills, weight loss, fatigue and malaise  Cardiovascular ROS: negative for - chest pain, edema, murmur, orthopnea, palpitations or pnd  Gastrointestinal ROS: no nausea, vomiting, abdominal pain  Dermatological ROS: negative for - pruritus, rash or skin lesion changes        Past Medical History:  Past Medical History:   Diagnosis Date    Hypertension         Past Surgical History:  Past Surgical History:   Procedure Laterality Date    HX HIP REPLACEMENT      left hip        Medications:  Prior to Admission medications    Medication Sig Start Date End Date Taking? Authorizing Provider   traMADol (ULTRAM) 50 mg tablet Take 1 Tab by mouth every six (6) hours as needed for Pain. Max Daily Amount: 200 mg. 8/1/16   JOSEFINA Delcid   [facility]) [facility] RW 1/4/14   Lizandro Russo DO   losartan 50 mg tab 100 mg, hydrochlorothiazide 25 mg tab 25 mg Take  by mouth daily. Kennedy Dang MD   amLODIPine (NORVASC) 10 mg tablet Take  by mouth daily. Kennedy Dang MD   meloxicam (MOBIC) 15 mg tablet Take 15 mg by mouth daily.     Kennedy Dang MD       Current Facility-Administered Medications   Medication Dose Route Frequency    amLODIPine (NORVASC) tablet 10 mg  10 mg Oral DAILY    losartan/hydroCHLOROthiazide (HYZAAR) 100/25 mg   Oral DAILY    hydrALAZINE (APRESOLINE) 20 mg/mL injection 10 mg  10 mg IntraVENous Q6H    cloNIDine (CATAPRES) 0.1 mg/24 hr patch 1 Patch  1 Patch TransDERmal Q7D    metoprolol (LOPRESSOR) injection 2.5 mg  2.5 mg IntraVENous Q6H    atorvastatin (LIPITOR) tablet 20 mg  20 mg Oral DAILY    insulin lispro (HUMALOG) injection   SubCUTAneous Q6H    aspirin delayed-release tablet 81 mg  81 mg Oral DAILY    heparin (porcine) injection 5,000 Units  5,000 Units SubCUTAneous Q8H    sodium chloride (NS) flush 5-40 mL  5-40 mL IntraVENous Q8H       Allergy:  Allergies   Allergen Reactions    Rocephin [Ceftriaxone] Other (comments)     Flushing        Social History:  Social History     Tobacco Use    Smoking status: Never Smoker    Smokeless tobacco: Never Used   Substance Use Topics    Alcohol use: No    Drug use: No        Family History:  History reviewed. No pertinent family history. Objective:   Vital Signs:    Blood pressure 158/79, pulse 89, temperature 98.7 °F (37.1 °C), resp. rate 17, height 5' 5\" (1.651 m), weight 125.3 kg (276 lb 3.2 oz), SpO2 97 %. Body mass index is 45.96 kg/m². O2 Device: Nasal cannula   O2 Flow Rate (L/min): 2 l/min   Temp (24hrs), Av.9 °F (37.2 °C), Min:98.4 °F (36.9 °C), Max:99.4 °F (37.4 °C)         Intake/Output:   Last shift:      No intake/output data recorded. Last 3 shifts:  1901 -  0700  In: -   Out: 2975 [Urine:2975]    Intake/Output Summary (Last 24 hours) at 5/3/2020 3372  Last data filed at 5/3/2020 0426  Gross per 24 hour   Intake    Output 2075 ml   Net -2075 ml       Physical Exam:  General: AAO x 3, in no distress, appears older than stated age, morbidly obese, on NC O2  HEENT: PERRLA, throat normal without erythema or exudate  Neck: No abnormally enlarged lymph nodes or thyroid, supple  Chest: normal, obese chest wall  Lungs: moderate air entry, few rhonchi scattered bilaterally, normal percussion anterior chest bilaterally, no tenderness/ rash, exam limitations  Heart: Regular rate and rhythm, S1S2 present or without murmur or extra heart sounds  Abdomen: obese, bowel sounds normoactive, tympanic, soft without significant tenderness, masses, organomegaly or guarding, rigidity, rebound  Extremity: no bl LE edema; no cyanosis, clubbing  Capillary refill: normal  Neuro: AAO x 3, follows simple commands, tracking in room, motor strength 5/5 on RT side and 4/5 on LT side, no involuntary movements, exam limitations  Skin: Skin color, texture, turgor fair.  Skin dry, warm, non-diaphoretic    Data:     Recent Results (from the past 24 hour(s))   GLUCOSE, POC    Collection Time: 20 11:31 AM Result Value Ref Range    Glucose (POC) 106 70 - 110 mg/dL   GLUCOSE, POC    Collection Time: 05/02/20  5:31 PM   Result Value Ref Range    Glucose (POC) 102 70 - 110 mg/dL   GLUCOSE, POC    Collection Time: 05/02/20 11:38 PM   Result Value Ref Range    Glucose (POC) 111 (H) 70 - 110 mg/dL   CBC W/O DIFF    Collection Time: 05/03/20  5:25 AM   Result Value Ref Range    WBC 7.9 4.6 - 13.2 K/uL    RBC 5.30 4.20 - 5.30 M/uL    HGB 12.4 12.0 - 16.0 g/dL    HCT 41.9 35.0 - 45.0 %    MCV 79.1 74.0 - 97.0 FL    MCH 23.4 (L) 24.0 - 34.0 PG    MCHC 29.6 (L) 31.0 - 37.0 g/dL    RDW 16.3 (H) 11.6 - 14.5 %    PLATELET 756 498 - 510 K/uL    MPV 10.6 9.2 - 85.3 FL   METABOLIC PANEL, COMPREHENSIVE    Collection Time: 05/03/20  5:25 AM   Result Value Ref Range    Sodium 147 (H) 136 - 145 mmol/L    Potassium 4.0 3.5 - 5.5 mmol/L    Chloride 109 100 - 111 mmol/L    CO2 32 21 - 32 mmol/L    Anion gap 6 3.0 - 18 mmol/L    Glucose 106 (H) 74 - 99 mg/dL    BUN 31 (H) 7.0 - 18 MG/DL    Creatinine 0.99 0.6 - 1.3 MG/DL    BUN/Creatinine ratio 31 (H) 12 - 20      GFR est AA >60 >60 ml/min/1.73m2    GFR est non-AA 56 (L) >60 ml/min/1.73m2    Calcium 9.2 8.5 - 10.1 MG/DL    Bilirubin, total 0.5 0.2 - 1.0 MG/DL    ALT (SGPT) 18 13 - 56 U/L    AST (SGOT) 16 10 - 38 U/L    Alk.  phosphatase 47 45 - 117 U/L    Protein, total 6.7 6.4 - 8.2 g/dL    Albumin 3.0 (L) 3.4 - 5.0 g/dL    Globulin 3.7 2.0 - 4.0 g/dL    A-G Ratio 0.8 0.8 - 1.7     GLUCOSE, POC    Collection Time: 05/03/20  5:36 AM   Result Value Ref Range    Glucose (POC) 98 70 - 110 mg/dL           Recent Labs     05/02/20  0845 05/01/20  0336   FIO2I 28 0.26   HCO3I 35.6* 34.5*   PCO2I 61.2* 60.8*   PHI 7.372 7.362   PO2I 65* 79*       All Micro Results     Procedure Component Value Units Date/Time    CULTURE, URINE [[id number]] Collected:  04/30/20 1440    Order Status:  Completed Specimen:  Urine from Clean catch Updated:  05/02/20 0819     Special Requests: NO SPECIAL REQUESTS Culture result: No growth (<1,000 CFU/ML)       CULTURE, URINE [[id number]] Collected:  04/29/20 1330    Order Status:  Completed Specimen:  Cath Urine Updated:  04/30/20 1832     Special Requests: NO SPECIAL REQUESTS        Culture result: No growth (<1,000 CFU/ML)             Telemetry: normal sinus rhythm    4/28/20 ECHO  · Left Ventricle: Normal cavity size, wall thickness and systolic function (ejection fraction normal). The estimated ejection fraction is 55 - 60%. There is mild (grade 1) left ventricular diastolic dysfunction E'A ratio= 0.90. · Pulmonary Artery: Pulmonary arteries not well visualized. Pulmonary arterial systolic pressure (PASP) is 31 mmHg. Pulmonary hypertension found to be mild. Imaging:  [x]I have personally reviewed the patients chest radiographs images and report   5/1/20  Results from Hospital Encounter encounter on 04/27/20   XR CHEST PORT    Narrative AP portable chest, 5/1/2020 at 0445 hours:    INDICATION: Shortness of breath and congestive heart failure. Hypoinflation with elevation right hemidiaphragm. Top normal heart size. Mild  thoracic aortic ectasia. No new infiltrate or definitive acute congestive heart  failure. Impression IMPRESSION:  No definite interval change. Results from [place] encounter on 04/27/20   CT HEAD WO CONT    Narrative EXAM: CT HEAD WO CONT    CLINICAL INDICATION/HISTORY: change in mental status    COMPARISON: April 29, 2020    TECHNIQUE: Axial CT imaging of the head was performed without intravenous  contrast. Sagittal and coronal reconstructions are provided. One or more dose  reduction techniques were used on this CT: automated exposure control,  adjustment of the mAs and/or kVp according to patient size, and iterative  reconstruction techniques. The specific techniques used on this CT exam have  been documented in the patient's electronic medical record.  Digital Imaging and  Communications in Medicine (DICOM) format image data are available to  nonaffiliated external healthcare facilities or entities on a secure, media  free, reciprocally searchable basis with patient authorization for at least a  12-month period after this study      _______________    FINDINGS: Image quality degraded by moderate to severe motion artifact. BRAIN AND POSTERIOR FOSSA: The sulci, folia, ventricles and basal cisterns are  within normal limits for the patient's age. There is no intracranial hemorrhage,  mass effect, or midline shift. There are no areas of abnormal parenchymal  attenuation. EXTRA-AXIAL SPACES AND MENINGES: There are no abnormal extra-axial fluid  collections. CALVARIUM: Intact. SINUSES: Clear. OTHER: None.    _______________      Impression IMPRESSION:    Limited study secondary to motion. No definite acute intracranial abnormalities. [x]See my orders for details    My assessment, plan of care, findings, medications, side effects etc were discussed with:  [x]nursing []PT/OT    [x]respiratory therapy [x]Dr. Shahid Guerra [x]Patient     [x]Total critical care time exclusive of procedures 35 minutes with complex decision making performed and > 50% time spent in face to face evaluation.     Cuong López MD

## 2020-05-03 NOTE — PROGRESS NOTES
Problem: Mobility Impaired (Adult and Pediatric)  Goal: *Acute Goals and Plan of Care (Insert Text)  Description: Physical Therapy Goals   Initiated 5/3/2020 and to be accomplished within 5-7 day(s)  1. Patient will move from supine <> sit with Min A in prep for out of bed activity and change of position. 2.  Patient will perform sit<> stand with CGA-S with LRAD in prep for transfers/ambulation. 3.  Patient will transfer from bed <> chair with Min A with LRAD for time up in chair for completion of ADL activity. 4.  Patient will ambulate >50 feet with LRAD/Min A for improved functional mobility/safe discharge. Outcome: Progressing Towards Goal   PHYSICAL THERAPY EVALUATION    Patient: Sue Morales (80 y.o. female)  Date: 5/3/2020  Primary Diagnosis: Hypertensive emergency [I16.1]  Elevated troponin [R79.89]        Precautions: Fall    ASSESSMENT :  Based on the objective data described below, the patient presents with decrease independence w/ bed mobility, transfers, gait, and step negotiation. Pt seen in supine prior to session w/ supplemental O2, IV, telemonitor, and zaragoza catheter. Pt seen w/ OT for an additional set of skilled hands. Pt is alert to self and place only. Pt is confused during session and very slow to respond to questions. Pt is easily distracted and requires frequent redirecting and frequent VCs to follow simple step commands. Pt able to sit at the EOB w/ assistx 2 needed. Pt initially required MCs for stability while sitting upright but with time pt able to maintain upright sitting w/o assistance. Pt able to stand w/ RW attempt x 2 needed, OT present to assist w/ cleaning the pt as pt soiled herself. Pt able to take a few side steps towards the [facility] w/ RW, max VCs needed for proper gait sequencing as pt had difficulty. Pt transferred back to supine in bed after session, call bell and tray in reach, nurse notified after session.       Patient will benefit from skilled intervention to address the above impairments. Patients rehabilitation potential is considered to be Fair  Factors which may influence rehabilitation potential include:   []         None noted  []         Mental ability/status  []         Medical condition  [x]         Home/family situation and support systems  [x]         Safety awareness  []         Pain tolerance/management  []         Other:      PLAN :  Recommendations and Planned Interventions:  [x]           Bed Mobility Training             [x]    Neuromuscular Re-Education  [x]           Transfer Training                   []    Orthotic/Prosthetic Training  [x]           Gait Training                          []    Modalities  [x]           Therapeutic Exercises          []    Edema Management/Control  [x]           Therapeutic Activities            [x]    Patient and Family Training/Education  []           Other (comment):    Frequency/Duration: Patient will be followed by physical therapy 1-2 times per day to address goals. Discharge Recommendations: Rehab  Further Equipment Recommendations for Discharge:TBD by next level of care     SUBJECTIVE:   Patient stated I'm okay.     OBJECTIVE DATA SUMMARY:     Past Medical History:   Diagnosis Date    Hypertension      Past Surgical History:   Procedure Laterality Date    HX HIP REPLACEMENT      left hip     Barriers to Learning/Limitations: yes;  physical  Compensate with: Verbal Cues and Tactile Cues  Prior Level of Function/Home Situation:   Home Situation  Home Environment: Private residence  # Steps to Enter: 3  Rails to Enter: Yes  Hand Rails : Left  One/Two Story Residence: One story  # of Interior Steps: 13  Living Alone: No  Support Systems: Family member(s)  Patient Expects to be Discharged to[de-identified] Private residence  Current DME Used/Available at Home: Cane, quad  Tub or Shower Type: Tub/Shower combination  Critical Behavior:  Neurologic State: Alert;Confused  Orientation Level: Oriented to person;Oriented to place; Disoriented to situation;Disoriented to time  Cognition: Decreased command following;Decreased attention/concentration  Safety/Judgement: Fall prevention;Decreased insight into deficits  Psychosocial  Patient Behaviors: Calm;Confused; Cooperative  Purposeful Interaction: Yes  Pt Identified Daily Priority: Clinical issues (comment)  Caritas Process: Attend basic human needs;Create healing environment;Open life/death unknowns  Caring Interventions: Reassure; Therapeutic modalities  Reassure: Acceptance; Informing  Therapeutic Modalities: Massage  Skin Condition/Temp: Warm  Skin Integrity: Excoriation(abdominal folds)  Skin Integumentary  Skin Color: Appropriate for ethnicity  Skin Condition/Temp: Warm  Skin Integrity: Excoriation(abdominal folds)  Turgor: Non-tenting  Hair Growth: Present  Varicosities: Absent  Strength:    Strength: Generally decreased, functional  Tone & Sensation:   Sensation: Intact  Range Of Motion:  AROM: Generally decreased, functional  Functional Mobility:  Bed Mobility:  Rolling: Total assistance  Supine to Sit: Maximum assistance; Total assistance;Assist x2  Sit to Supine: Total assistance;Assist x2  Scooting: Total assistance;Assist x2  Transfers:  Sit to Stand: Minimum assistance;Assist x2  Stand to Sit: Minimum assistance;Assist x2  Balance:   Sitting: Impaired  Sitting - Static: Fair (occasional)  Sitting - Dynamic: Fair (occasional)(-)  Standing: Impaired;Pull to stand; With support  Standing - Static: Fair(-)  Standing - Dynamic : Poor  Ambulation/Gait Training:  Distance (ft): 1 Feet (ft)  Assistive Device: Gait belt;Walker, rolling  Ambulation - Level of Assistance: Maximum assistance;Assist x2  Gait Description (WDL): Exceptions to WDL  Gait Abnormalities: Decreased step clearance;Shuffling gait; Step to gait  Base of Support: Narrowed  Stance: Time  Speed/Marybeth: Shuffled; Slow  Step Length: Left shortened;Right shortened  Swing Pattern: Left asymmetrical;Right asymmetrical  Pain:  Pain Scale 1: Numeric (0 - 10)  Pain Intensity 1: 0  Activity Tolerance:   Fair-  Please refer to the flowsheet for vital signs taken during this treatment. After treatment:   []         Patient left in no apparent distress sitting up in chair  [x]         Patient left in no apparent distress in bed  [x]         Call bell left within reach  [x]         Nursing notified  []         Caregiver present  []         Bed alarm activated    COMMUNICATION/EDUCATION:   [x]         Fall prevention education was provided and the patient/caregiver indicated understanding. [x]         Patient/family have participated as able in goal setting and plan of care. [x]         Patient/family agree to work toward stated goals and plan of care. []         Patient understands intent and goals of therapy, but is neutral about his/her participation. []         Patient is unable to participate in goal setting and plan of care.     Thank you for this referral.  Marbella Agarwal, PT   Time Calculation: 26 mins   Eval Complexity: History: MEDIUM  Complexity : 1-2 comorbidities / personal factors will impact the outcome/ POC Exam:LOW Complexity : 1-2 Standardized tests and measures addressing body structure, function, activity limitation and / or participation in recreation  Presentation: LOW Complexity : Stable, uncomplicated  Clinical Decision Making:Medium Complexity ambulate <30ft  Overall Complexity:MEDIUM

## 2020-05-03 NOTE — PROGRESS NOTES
Hospitalist Progress Note    Patient: Marya Antunez MRN: [id number]  [facility]: [id number]    YOB: 1950  Age: 81 y.o. Sex: female    DOA: 4/27/2020 LOS:  LOS: 6 days              IMPRESSION and Plan:    Meghann Morales is a 81 y.o. female with   Patient Active Problem List    Diagnosis Date Noted    Metabolic encephalopathy 55/65/8986    Hyperglycemia due to type 2 diabetes mellitus ([address] 04/30/2020    Acute respiratory failure with hypercapnia ([address] 04/30/2020    Hypokalemia 04/28/2020    Lung nodule 04/28/2020    CAP (community acquired pneumonia) 04/28/2020    Elevated troponin 04/27/2020    Hypertensive emergency 04/27/2020     Principal Problem:    Hypertensive emergency (4/27/2020)    Active Problems:    Elevated troponin (4/27/2020)      Hypokalemia (4/28/2020)      Lung nodule (4/28/2020)      CAP (community acquired pneumonia) (2/80/9309)      Metabolic encephalopathy (7/64/0079)      Hyperglycemia due to type 2 diabetes mellitus ([address] (4/30/2020)      Acute respiratory failure with hypercapnia ([address] (4/30/2020)      D/w Dr. Brenda Oleary to transfer to [address] [place] [address]        Patient's condition is fair        Recommend to continue hospitalization. Discussed with patient. Chief Complaints:   Chief Complaint   Patient presents with    Shortness of Breath     SUBJECTIVE:  Pt is seen and examined. much more awake and alert. No pain. Review of systems:    Review of Systems   Constitutional: Positive for malaise/fatigue. HENT: Negative. Eyes: Negative. Respiratory: Positive for shortness of breath. Cardiovascular: Positive for leg swelling. Negative for chest pain, palpitations and orthopnea. Gastrointestinal: Negative. Negative for abdominal pain, diarrhea and heartburn. Genitourinary: Negative for dysuria and hematuria. Skin: Negative. Neurological: Positive for weakness.    Psychiatric/Behavioral: Negative for depression, substance abuse and suicidal ideas. The patient is not nervous/anxious.         PE:  Patient Vitals for the past 24 hrs:   BP Temp Pulse Resp SpO2   05/03/20 0930 (!) 181/94  93  99 %   05/03/20 0900 174/82  93  100 %   05/03/20 0830 157/78  88  98 %   05/03/20 0800 159/78  88  97 %   05/03/20 0730 (!) 157/92  90  98 %   05/03/20 0719  98.7 °F (37.1 °C)      05/03/20 0700 158/79  89  97 %   05/03/20 0630 148/84  94  98 %   05/03/20 0624   96     05/03/20 0600 169/79  92  99 %   05/03/20 0530 151/85  98  98 %   05/03/20 0500 146/65  100 17 99 %   05/03/20 0430 149/70  98 19 98 %   05/03/20 0425  98.8 °F (37.1 °C)      05/03/20 0400 145/69  96 13 98 %   05/03/20 0330 134/57  96 15 99 %   05/03/20 0300 140/68  (!) 101 16 98 %   05/03/20 0230 144/63  98 (!) 0 98 %   05/03/20 0200 172/78  (!) 102 (!) 32 100 %   05/03/20 0130 167/84  (!) 102 (!) 33 99 %   05/03/20 0100 186/65  (!) 106 22 100 %   05/03/20 0030 176/79  (!) 107 18 98 %   05/03/20 0000 162/65       05/02/20 2351  99.2 °F (37.3 °C)      05/02/20 2343 (!) 194/99  (!) 105     05/02/20 2330   (!) 110 26 94 %   05/02/20 2300   (!) 106 23 96 %   05/02/20 2230   (!) 112 24 94 %   05/02/20 2200 194/84  (!) 111 29 95 %   05/02/20 2130 173/84  (!) 109 26 99 %   05/02/20 2100 (!) 200/93  (!) 107 24 99 %   05/02/20 2030 (!) 184/112  (!) 107 19 96 %   05/02/20 2004 (!) 184/95  (!) 115     05/02/20 2000 (!) 184/95 99.4 °F (37.4 °C) (!) 113 21    05/02/20 1945  99.4 °F (37.4 °C)      05/02/20 1930 (!) 184/92  (!) 117 18 96 %   05/02/20 1900   (!) 118 20    05/02/20 1830 (!) 177/94  (!) 114 23    05/02/20 1828   (!) 115 26    05/02/20 1800 (!) 191/100  (!) 101 21 99 %   05/02/20 1738 (!) 203/107  97     05/02/20 1700 (!) 203/91  99 19 (!) 89 %   05/02/20 1600 (!) 189/95 98.4 °F (36.9 °C) 97 24 95 %   05/02/20 1530 (!) 190/112  99 21 93 %   05/02/20 1519  98.4 °F (36.9 °C)      05/02/20 1500 (!) 174/116  98 22 94 % 05/02/20 1430 176/89  99 20 94 %   05/02/20 1405 181/90  99     05/02/20 1400 181/90  98 23    05/02/20 1330 (!) 197/105  97 21 99 %   05/02/20 1300 (!) 185/103  93 21 100 %   05/02/20 1246 (!) 200/103  (!) 103     05/02/20 1230 (!) 186/105  (!) 101 21    05/02/20 1200 (!) 186/111  (!) 105 19 100 %   05/02/20 1130 (!) 180/95 98.5 °F (36.9 °C) (!) 103 21 98 %   05/02/20 1100 (!) 178/93  97 22 98 %   05/02/20 1036 (!) 193/108  96         Intake/Output Summary (Last 24 hours) at 5/3/2020 1031  Last data filed at 5/3/2020 0929  Gross per 24 hour   Intake    Output 2225 ml   Net -2225 ml     Patient Vitals for the past 120 hrs:   Weight   04/28/20 1037 124.7 kg (275 lb)   04/28/20 1039 124.7 kg (275 lb)   04/29/20 0352 125.3 kg (276 lb 3.2 oz)         Physical Exam  Vitals signs and nursing note reviewed. Constitutional:       General: She is in acute distress. Neck:      Musculoskeletal: Normal range of motion and neck supple. Vascular: No JVD. Cardiovascular:      Rate and Rhythm: Normal rate and regular rhythm. Heart sounds: Normal heart sounds. Pulmonary:      Effort: Respiratory distress present. Breath sounds: Normal breath sounds. Abdominal:      General: Bowel sounds are normal. There is no distension. Palpations: Abdomen is soft. Tenderness: There is no abdominal tenderness. There is no rebound. Musculoskeletal: Normal range of motion. Skin:     General: Skin is warm and dry. Neurological:      Mental Status: She is alert and oriented to person, place, and time.    Psychiatric:         Mood and Affect: Affect normal.             Intake and Output:  Current Shift:  05/03 0701 - 05/03 1900  In: -   Out: 150 [Urine:150]  Last three shifts:  05/01 1901 - 05/03 0700  In: -   Out: 2975 [Urine:2975]    Lab/Data Reviewed:  Recent Results (from the past 8 hour(s))   CBC W/O DIFF    Collection Time: 05/03/20  5:25 AM   Result Value Ref Range    WBC 7.9 4.6 - 13.2 K/uL    RBC 5.30 4.20 - 5.30 M/uL    HGB 12.4 12.0 - 16.0 g/dL    HCT 41.9 35.0 - 45.0 %    MCV 79.1 74.0 - 97.0 FL    MCH 23.4 (L) 24.0 - 34.0 PG    MCHC 29.6 (L) 31.0 - 37.0 g/dL    RDW 16.3 (H) 11.6 - 14.5 %    PLATELET 429 073 - 687 K/uL    MPV 10.6 9.2 - 33.2 FL   METABOLIC PANEL, COMPREHENSIVE    Collection Time: 05/03/20  5:25 AM   Result Value Ref Range    Sodium 147 (H) 136 - 145 mmol/L    Potassium 4.0 3.5 - 5.5 mmol/L    Chloride 109 100 - 111 mmol/L    CO2 32 21 - 32 mmol/L    Anion gap 6 3.0 - 18 mmol/L    Glucose 106 (H) 74 - 99 mg/dL    BUN 31 (H) 7.0 - 18 MG/DL    Creatinine 0.99 0.6 - 1.3 MG/DL    BUN/Creatinine ratio 31 (H) 12 - 20      GFR est AA >60 >60 ml/min/1.73m2    GFR est non-AA 56 (L) >60 ml/min/1.73m2    Calcium 9.2 8.5 - 10.1 MG/DL    Bilirubin, total 0.5 0.2 - 1.0 MG/DL    ALT (SGPT) 18 13 - 56 U/L    AST (SGOT) 16 10 - 38 U/L    Alk.  phosphatase 47 45 - 117 U/L    Protein, total 6.7 6.4 - 8.2 g/dL    Albumin 3.0 (L) 3.4 - 5.0 g/dL    Globulin 3.7 2.0 - 4.0 g/dL    A-G Ratio 0.8 0.8 - 1.7     GLUCOSE, POC    Collection Time: 05/03/20  5:36 AM   Result Value Ref Range    Glucose (POC) 98 70 - 110 mg/dL     Medications:  Current Facility-Administered Medications   Medication Dose Route Frequency    amLODIPine (NORVASC) tablet 10 mg  10 mg Oral DAILY    losartan/hydroCHLOROthiazide (HYZAAR) 100/25 mg   Oral DAILY    hydrALAZINE (APRESOLINE) 20 mg/mL injection 10 mg  10 mg IntraVENous Q6H    cloNIDine (CATAPRES) 0.1 mg/24 hr patch 1 Patch  1 Patch TransDERmal Q7D    metoprolol (LOPRESSOR) injection 2.5 mg  2.5 mg IntraVENous Q6H    labetaloL (NORMODYNE;TRANDATE) 20 mg/4 mL (5 mg/mL) injection 20 mg  20 mg IntraVENous Q6H PRN    atorvastatin (LIPITOR) tablet 20 mg  20 mg Oral DAILY    albuterol-ipratropium (DUO-NEB) 2.5 MG-0.5 MG/3 ML  3 mL Nebulization Q4H PRN    insulin lispro (HUMALOG) injection   SubCUTAneous Q6H    glucose chewable tablet 16 g  4 Tab Oral PRN    glucagon (GLUCAGEN) injection 1 mg  1 mg IntraMUSCular PRN    dextrose 10% infusion 125-250 mL  125-250 mL IntraVENous PRN    ELECTROLYTE REPLACEMENT PROTOCOL - Potassium Renal Dosing  1 Each Other PRN    ELECTROLYTE REPLACEMENT PROTOCOL - Magnesium   1 Each Other PRN    ELECTROLYTE REPLACEMENT PROTOCOL  - Phosphorus Renal Dosing  1 Each Other PRN    aspirin delayed-release tablet 81 mg  81 mg Oral DAILY    heparin (porcine) injection 5,000 Units  5,000 Units SubCUTAneous Q8H    sodium chloride (NS) flush 5-40 mL  5-40 mL IntraVENous Q8H    sodium chloride (NS) flush 5-40 mL  5-40 mL IntraVENous PRN    ondansetron (ZOFRAN) injection 4 mg  4 mg IntraVENous Q4H PRN       Recent Results (from the past 24 hour(s))   GLUCOSE, POC    Collection Time: 05/02/20 11:31 AM   Result Value Ref Range    Glucose (POC) 106 70 - 110 mg/dL   GLUCOSE, POC    Collection Time: 05/02/20  5:31 PM   Result Value Ref Range    Glucose (POC) 102 70 - 110 mg/dL   GLUCOSE, POC    Collection Time: 05/02/20 11:38 PM   Result Value Ref Range    Glucose (POC) 111 (H) 70 - 110 mg/dL   CBC W/O DIFF    Collection Time: 05/03/20  5:25 AM   Result Value Ref Range    WBC 7.9 4.6 - 13.2 K/uL    RBC 5.30 4.20 - 5.30 M/uL    HGB 12.4 12.0 - 16.0 g/dL    HCT 41.9 35.0 - 45.0 %    MCV 79.1 74.0 - 97.0 FL    MCH 23.4 (L) 24.0 - 34.0 PG    MCHC 29.6 (L) 31.0 - 37.0 g/dL    RDW 16.3 (H) 11.6 - 14.5 %    PLATELET 519 245 - 936 K/uL    MPV 10.6 9.2 - 31.4 FL   METABOLIC PANEL, COMPREHENSIVE    Collection Time: 05/03/20  5:25 AM   Result Value Ref Range    Sodium 147 (H) 136 - 145 mmol/L    Potassium 4.0 3.5 - 5.5 mmol/L    Chloride 109 100 - 111 mmol/L    CO2 32 21 - 32 mmol/L    Anion gap 6 3.0 - 18 mmol/L    Glucose 106 (H) 74 - 99 mg/dL    BUN 31 (H) 7.0 - 18 MG/DL    Creatinine 0.99 0.6 - 1.3 MG/DL    BUN/Creatinine ratio 31 (H) 12 - 20      GFR est AA >60 >60 ml/min/1.73m2    GFR est non-AA 56 (L) >60 ml/min/1.73m2    Calcium 9.2 8.5 - 10.1 MG/DL Bilirubin, total 0.5 0.2 - 1.0 MG/DL    ALT (SGPT) 18 13 - 56 U/L    AST (SGOT) 16 10 - 38 U/L    Alk.  phosphatase 47 45 - 117 U/L    Protein, total 6.7 6.4 - 8.2 g/dL    Albumin 3.0 (L) 3.4 - 5.0 g/dL    Globulin 3.7 2.0 - 4.0 g/dL    A-G Ratio 0.8 0.8 - 1.7     GLUCOSE, POC    Collection Time: 05/03/20  5:36 AM   Result Value Ref Range    Glucose (POC) 98 70 - 110 mg/dL       Procedures/imaging: see electronic medical records for all procedures/Xrays and details which were not copied into this note but were reviewed prior to creation of Chloe Goodwin MD   5/3/2020, 12:44 PM

## 2020-05-03 NOTE — PROGRESS NOTES
Problem: Self Care Deficits Care Plan (Adult)  Goal: *Acute Goals and Plan of Care (Insert Text)  Description: Occupational Therapy Goals  Initiated 5/3/2020 within 7 day(s). 1.  Patient will perform grooming with minimal assistance/contact guard assist standing by sink for 5 minutes. 2.  Patient will perform upper body dressing with supervision/set-up. 3.  Patient will perform lower body dressing with minimal assistance/contact guard assist and use of AEs PRN. 4.  Patient will perform toilet transfers with minimal assistance/contact guard assist.  5.  Patient will perform all aspects of toileting with minimal assistance/contact guard assist.  6.  Patient will participate in upper extremity therapeutic exercise/activities with minimal assistance/contact guard assist for 10 minutes. 7.  Patient will utilize energy conservation techniques during functional activities with verbal, visual and tactile cues. Outcome: Progressing Towards Goal    OCCUPATIONAL THERAPY EVALUATION    Patient: Aneta Morales (80 y.o. female)  Date: 5/3/2020  Primary Diagnosis: Hypertensive emergency [I16.1]  Elevated troponin [R79.89]        Precautions:   Fall  PLOF: unable to obtain information; pt confused    ASSESSMENT :  Based on the objective data described below, the patient presents with decreased strength, endurance and balance for carryover of ADLs and transfers following above mentioned medical diagnosis. Pt confused, oriented to self and place only and requires max-total assistance for ADLs and transfers during this session. Pt requires total assist x2 for bed mobility, max-mod A for STS transfers, total assistance for LB dressing and maribel care and set-up with grooming at bed level during this session. Pt was left supine in bed at the end of session in NAD, pain 0/10. Pt co-treat with PT to have another set of skilled hands for safety with transfers.     Patient will benefit from skilled intervention to address the above impairments. Patient's rehabilitation potential is considered to be Good  Factors which may influence rehabilitation potential include:   []             None noted  [x]             Mental ability/status  [x]             Medical condition  []             Home/family situation and support systems  [x]             Safety awareness  []             Pain tolerance/management  []             Other:      PLAN :  Recommendations and Planned Interventions:   [x]               Self Care Training                  [x]      Therapeutic Activities  [x]               Functional Mobility Training   []      Cognitive Retraining  [x]               Therapeutic Exercises           [x]      Endurance Activities  [x]               Balance Training                    []      Neuromuscular Re-Education  []               Visual/Perceptual Training     [x]      Home Safety Training  [x]               Patient Education                   [x]      Family Training/Education  []               Other (comment):    Frequency/Duration: Patient will be followed by occupational therapy 1-2 times per day/4-7 days per week to address goals. Discharge Recommendations: Rehab  Further Equipment Recommendations for Discharge: TBD     SUBJECTIVE:   Patient stated  I am ok.     OBJECTIVE DATA SUMMARY:     Past Medical History:   Diagnosis Date    Hypertension      Past Surgical History:   Procedure Laterality Date    HX HIP REPLACEMENT      left hip     Barriers to Learning/Limitations: yes;  altered mental status (i.e.Sedation, Confusion)  Compensate with: visual, verbal, tactile, kinesthetic cues/model    Home Situation:   Home Situation  Home Environment: Private residence  # Steps to Enter: 3  Rails to Enter: Yes  Hand Rails : Left  One/Two Story Residence: One story  # of Interior Steps: 13  Living Alone: No  Support Systems: Family member(s)  Patient Expects to be Discharged to[de-identified] Private residence  Current DME Used/Available at Home: [address] [facility] [place], quad  Tub or Shower Type: Tub/Shower combination  []  Right hand dominant   []  Left hand dominant    Cognitive/Behavioral Status:  Neurologic State: Alert;Confused  Orientation Level: Oriented to person;Oriented to place; Disoriented to situation;Disoriented to time  Cognition: Decreased command following;Decreased attention/concentration  Safety/Judgement: Fall prevention;Decreased insight into deficits    Skin: intact  Edema: none    Vision/Perceptual:    Tracking: Able to track stimulus in all quadrants w/o difficulty    Coordination: BUE  Coordination: Within functional limits  Fine Motor Skills-Upper: Left Intact; Right Intact    Gross Motor Skills-Upper: Left Intact; Right Intact  Balance:  Sitting: Impaired  Sitting - Static: Fair (occasional)  Sitting - Dynamic: Fair (occasional)(-)  Standing: Impaired;Pull to stand; With support  Standing - Static: Fair(-)  Standing - Dynamic : Poor  Strength: BUE  Strength: Generally decreased, functional  Tone & Sensation: BUE  Sensation: Intact  Range of Motion: BUE  AROM: Generally decreased, functional  Functional Mobility and Transfers for ADLs:  Bed Mobility:  Supine to Sit: Maximum assistance;Assist x2  Sit to Supine: Total assistance;Assist x2  Scooting: Total assistance;Assist x2  Transfers:  Sit to Stand: Minimum assistance;Assist x2  Stand to Sit: Minimum assistance;Assist x2  ADL Assessment:   Feeding: Setup    Oral Facial Hygiene/Grooming: Setup    Upper Body Dressing: Minimum assistance    Lower Body Dressing: Total assistance    Toileting: Total assistance  ADL Intervention:  Grooming  Grooming Assistance: Set-up  Position Performed: Long sitting on bed  Washing Face: Set-up    Lower Body Dressing Assistance  Dressing Assistance: Total assistance(dependent)  Socks:  Total assistance (dependent)  Leg Crossed Method Used: No  Position Performed: Seated edge of bed  Cues: Don;Physical assistance    Cognitive Retraining  Safety/Judgement: Fall prevention;Decreased insight into deficits    Therapeutic Exercise:      Pain:  Pain level pre-treatment: 0/10   Pain level post-treatment: 0/10   Pain Intervention(s): Medication (see MAR); Rest, Ice, Repositioning   Response to intervention: Nurse notified, See doc flow    Activity Tolerance:   Fair   Please refer to the flowsheet for vital signs taken during this treatment. After treatment:   [] Patient left in no apparent distress sitting up in chair  [x] Patient left in no apparent distress in bed  [x] Call bell left within reach  [x] Nursing notified  [] Caregiver present  [] Bed alarm activated    COMMUNICATION/EDUCATION:   [x] Role of Occupational Therapy in the acute care setting  [] Home safety education was provided and the patient/caregiver indicated understanding. [] Patient/family have participated as able in goal setting and plan of care. [] Patient/family agree to work toward stated goals and plan of care. [] Patient understands intent and goals of therapy, but is neutral about his/her participation. [x] Patient is unable to participate in goal setting and plan of care. Thank you for this referral.  Abelardo Pineda, OTR/L  Time Calculation: 20 mins    Eval Complexity: History: HIGH Complexity : Extensive review of history including physical, cognitive and psychosocial history ; Examination: HIGH Complexity : 5 or more performance deficits relating to physical, cognitive , or psychosocial skils that result in activity limitations and / or participation restrictions; Decision Making:HIGH Complexity : Patient presents with comorbidities that affect occupational performance.  Signifigant modification of tasks or assistance (eg, physical or verbal) with assessment (s) is necessary to enable patient to complete evaluation

## 2020-05-03 NOTE — PROGRESS NOTES
Cardiology Progress Note        Patient: Debra Kearns Minor        Sex: female          DOA: 4/27/2020  YOB: 1950      Age:  81 y.o.        LOS:  LOS: 6 days   Assessment/Plan     Principal Problem:    Hypertensive emergency (4/27/2020)    Active Problems:    Elevated troponin (4/27/2020)      Hypokalemia (4/28/2020)      Lung nodule (4/28/2020)      CAP (community acquired pneumonia) (5/81/5779)      Metabolic encephalopathy (7/05/4498)      Hyperglycemia due to type 2 diabetes mellitus ([facility] (4/30/2020)      Acute respiratory failure with hypercapnia ([facility] (4/30/2020)        Plan:  Maintaining NSR, RBBB  Labs and medication reviewed  Antihypertensive medication restarted and BP is better  Pt denied any active cardiac symptoms. Continue with current meds  Discussed with Dr. Raphael Staff  Dr. Jesus Manuel Scales will be seeing from tomorrow. THX                    Subjective:    cc:  Elevated trop  Stroke  HTN  encephalopathy        REVIEW OF SYSTEMS:     General: No fevers or chills. Cardiovascular: No chest pain or pressure. No palpitations. No ankle swelling  Pulmonary: No SOB, orthopnea, PND  Gastrointestinal: No nausea, vomiting or diarrhea      Objective:      Visit Vitals  BP (!) 181/94   Pulse 93   Temp 98.4 °F (36.9 °C)   Resp 17   Ht 5' 5\" (1.651 m)   Wt 125.3 kg (276 lb 3.2 oz)   SpO2 99%   BMI 45.96 kg/m²     Body mass index is 45.96 kg/m². Physical Exam:  General Appearance: Comfortable, not using accessory muscles of respiration. NECK: No JVD, no thyroidomeglay. LUNGS: Clear bilaterally. HEART: S1+S2 audible,    ABD: Non-tender, BS Audible    EXT: No edema, and no cysnosis. VASCULAR EXAM: Pulses are intact. PSYCHIATRIC EXAM: Mood is appropriate.     Medication:  Current Facility-Administered Medications   Medication Dose Route Frequency    amLODIPine (NORVASC) tablet 10 mg  10 mg Oral DAILY    losartan/hydroCHLOROthiazide (HYZAAR) 100/25 mg   Oral DAILY    hydrALAZINE (APRESOLINE) 20 mg/mL injection 10 mg  10 mg IntraVENous Q6H    cloNIDine (CATAPRES) 0.1 mg/24 hr patch 1 Patch  1 Patch TransDERmal Q7D    metoprolol (LOPRESSOR) injection 2.5 mg  2.5 mg IntraVENous Q6H    labetaloL (NORMODYNE;TRANDATE) 20 mg/4 mL (5 mg/mL) injection 20 mg  20 mg IntraVENous Q6H PRN    atorvastatin (LIPITOR) tablet 20 mg  20 mg Oral DAILY    albuterol-ipratropium (DUO-NEB) 2.5 MG-0.5 MG/3 ML  3 mL Nebulization Q4H PRN    insulin lispro (HUMALOG) injection   SubCUTAneous Q6H    glucose chewable tablet 16 g  4 Tab Oral PRN    glucagon (GLUCAGEN) injection 1 mg  1 mg IntraMUSCular PRN    dextrose 10% infusion 125-250 mL  125-250 mL IntraVENous PRN    ELECTROLYTE REPLACEMENT PROTOCOL - Potassium Renal Dosing  1 Each Other PRN    ELECTROLYTE REPLACEMENT PROTOCOL - Magnesium   1 Each Other PRN    ELECTROLYTE REPLACEMENT PROTOCOL  - Phosphorus Renal Dosing  1 Each Other PRN    aspirin delayed-release tablet 81 mg  81 mg Oral DAILY    heparin (porcine) injection 5,000 Units  5,000 Units SubCUTAneous Q8H    sodium chloride (NS) flush 5-40 mL  5-40 mL IntraVENous Q8H    sodium chloride (NS) flush 5-40 mL  5-40 mL IntraVENous PRN    ondansetron (ZOFRAN) injection 4 mg  4 mg IntraVENous Q4H PRN               Lab/Data Reviewed:  Procedures/imaging: see electronic medical records for all procedures/Xrays   and details which were not copied into this note but were reviewed prior to creation of Plan       All lab results for the last 24 hours reviewed.      Recent Labs     05/03/20 0525 05/02/20  0500 05/01/20 0419   WBC 7.9 8.2 6.6   HGB 12.4 12.2 11.6*   HCT 41.9 41.4 39.7    342 321     Recent Labs     05/03/20  0525 05/02/20  0500 05/01/20  1218 05/01/20  0419   * 145  --  144   K 4.0 4.0 3.7 3.6    108  --  107   CO2 32 32  --  33*   * 100*  --  110*   BUN 31* 26*  --  27*   CREA 0.99 0.95  --  1.02   CA 9.2 9.1  --  8.7       Signed By: Gonzales Carias MD     May 3, 2020

## 2020-05-03 NOTE — PROGRESS NOTES
NEUROLOGY PROGRESS NOTE        Patient: Juan Ramon Cortez Minor        Sex: female          DOA: 4/27/2020  YOB: 1950      Age:  81 y.o.         LOS: 6 days     Identification:  82 YO female presents with hypertensive emergency, hypertensive encephalopathy and acute confusion with left-sided weakness. SUBJECTIVE:   Hypertensive emergency has been greatly improved. Patient is more alert today. Stroke Work-up:  Brain MRI: Result Date: 5/1/2020  Impression: 1. The study is markedly limited by patient motion. If symptoms persist, repeat is recommended when the patient is more able to tolerate or can be sedated. 2. Multiple bilateral hemispheric areas of diffusion abnormality in large part restricted diffusion. The pattern would be more consistent with multiple infarctions most likely embolic more so than permanent brain injury from hypertensive encephalopathy. No hemorrhage or definitive mass effect. Ct Head Wo Cont Result Date: 4/29/2020  IMPRESSION: Limited study secondary to motion. No definite acute intracranial abnormalities. CTA of head and neck: Pending  Echocardiogram:   · Left Ventricle: Normal cavity size, wall thickness and systolic function (ejection fraction normal). The estimated ejection fraction is 55 - 60%. There is mild (grade 1) left ventricular diastolic dysfunction E'A ratio= 0.90. · Pulmonary Artery: Pulmonary arteries not well visualized. Pulmonary arterial systolic pressure (PASP) is 31 mmHg. Pulmonary hypertension found to be mild.     Lipid panel:   Lab Results   Component Value Date/Time    Cholesterol, total 174 04/28/2020 04:49 AM    HDL Cholesterol 41 04/28/2020 04:49 AM    LDL, calculated 106.6 (H) 04/28/2020 04:49 AM    VLDL, calculated 26.4 04/28/2020 04:49 AM    Triglyceride 132 04/28/2020 04:49 AM    CHOL/HDL Ratio 4.2 04/28/2020 04:49 AM     HbA1c:   Lab Results   Component Value Date/Time    Hemoglobin A1c 7.1 (H) 04/28/2020 02:28 PM         REVIEW OF SYSTEMS: Denies chest pain, abdominal pain, nausea or vomiting. No fever or chills. OBJECTIVE:      Visit Vitals  /82   Pulse (!) 102   Temp 98.5 °F (36.9 °C)   Resp 20   Ht 5' 5\" (1.651 m)   Wt 125.3 kg (276 lb 3.2 oz)   SpO2 100%   BMI 45.96 kg/m²     Physical Exam:  GEN: Alert, NAD  EYES: conjunctiva normal, lids with out lesions  HEENT: MMM. HEART: RRR +S1 +S2  LUNGS: CTA B/L no rales or rhonchi. ABDOMEN: Soft, non-tender. EXTREMITIES: No edema cyanosis  SKIN: no rashes or skin breakdown, no nodules  NEURO: Awake and alert to her name and hospital, 2020, February, [place]  Speech is fluent. No dysarthria. Cranials: Face is symmetric. Smiles symmetrically. EOMI, VFF. Tongue is midline. Motor: 5/5 all 4 extremities. Sensory: Grossly normal in light touch. DTR 1+ all 4 extremities. Coordination: Intact with no dysmetria during FNF.      Current Facility-Administered Medications   Medication Dose Route Frequency    amLODIPine (NORVASC) tablet 10 mg  10 mg Oral DAILY    losartan/hydroCHLOROthiazide (HYZAAR) 100/25 mg   Oral DAILY    hydrALAZINE (APRESOLINE) 20 mg/mL injection 10 mg  10 mg IntraVENous Q6H    cloNIDine (CATAPRES) 0.1 mg/24 hr patch 1 Patch  1 Patch TransDERmal Q7D    metoprolol (LOPRESSOR) injection 2.5 mg  2.5 mg IntraVENous Q6H    labetaloL (NORMODYNE;TRANDATE) 20 mg/4 mL (5 mg/mL) injection 20 mg  20 mg IntraVENous Q6H PRN    atorvastatin (LIPITOR) tablet 20 mg  20 mg Oral DAILY    albuterol-ipratropium (DUO-NEB) 2.5 MG-0.5 MG/3 ML  3 mL Nebulization Q4H PRN    insulin lispro (HUMALOG) injection   SubCUTAneous Q6H    glucose chewable tablet 16 g  4 Tab Oral PRN    glucagon (GLUCAGEN) injection 1 mg  1 mg IntraMUSCular PRN    dextrose 10% infusion 125-250 mL  125-250 mL IntraVENous PRN    ELECTROLYTE REPLACEMENT PROTOCOL - Potassium Renal Dosing  1 Each Other PRN    ELECTROLYTE REPLACEMENT PROTOCOL - Magnesium   1 Each Other PRN    ELECTROLYTE REPLACEMENT PROTOCOL  - Phosphorus Renal Dosing  1 Each Other PRN    aspirin delayed-release tablet 81 mg  81 mg Oral DAILY    heparin (porcine) injection 5,000 Units  5,000 Units SubCUTAneous Q8H    sodium chloride (NS) flush 5-40 mL  5-40 mL IntraVENous Q8H    sodium chloride (NS) flush 5-40 mL  5-40 mL IntraVENous PRN    ondansetron (ZOFRAN) injection 4 mg  4 mg IntraVENous Q4H PRN       Laboratory  Recent Results (from the past 24 hour(s))   GLUCOSE, POC    Collection Time: 05/02/20  5:31 PM   Result Value Ref Range    Glucose (POC) 102 70 - 110 mg/dL   GLUCOSE, POC    Collection Time: 05/02/20 11:38 PM   Result Value Ref Range    Glucose (POC) 111 (H) 70 - 110 mg/dL   CBC W/O DIFF    Collection Time: 05/03/20  5:25 AM   Result Value Ref Range    WBC 7.9 4.6 - 13.2 K/uL    RBC 5.30 4.20 - 5.30 M/uL    HGB 12.4 12.0 - 16.0 g/dL    HCT 41.9 35.0 - 45.0 %    MCV 79.1 74.0 - 97.0 FL    MCH 23.4 (L) 24.0 - 34.0 PG    MCHC 29.6 (L) 31.0 - 37.0 g/dL    RDW 16.3 (H) 11.6 - 14.5 %    PLATELET 148 237 - 782 K/uL    MPV 10.6 9.2 - 70.8 FL   METABOLIC PANEL, COMPREHENSIVE    Collection Time: 05/03/20  5:25 AM   Result Value Ref Range    Sodium 147 (H) 136 - 145 mmol/L    Potassium 4.0 3.5 - 5.5 mmol/L    Chloride 109 100 - 111 mmol/L    CO2 32 21 - 32 mmol/L    Anion gap 6 3.0 - 18 mmol/L    Glucose 106 (H) 74 - 99 mg/dL    BUN 31 (H) 7.0 - 18 MG/DL    Creatinine 0.99 0.6 - 1.3 MG/DL    BUN/Creatinine ratio 31 (H) 12 - 20      GFR est AA >60 >60 ml/min/1.73m2    GFR est non-AA 56 (L) >60 ml/min/1.73m2    Calcium 9.2 8.5 - 10.1 MG/DL    Bilirubin, total 0.5 0.2 - 1.0 MG/DL    ALT (SGPT) 18 13 - 56 U/L    AST (SGOT) 16 10 - 38 U/L    Alk.  phosphatase 47 45 - 117 U/L    Protein, total 6.7 6.4 - 8.2 g/dL    Albumin 3.0 (L) 3.4 - 5.0 g/dL    Globulin 3.7 2.0 - 4.0 g/dL    A-G Ratio 0.8 0.8 - 1.7     GLUCOSE, POC    Collection Time: 05/03/20  5:36 AM   Result Value Ref Range    Glucose (POC) 98 70 - 110 mg/dL   GLUCOSE, POC    Collection Time: 05/03/20 11:47 AM   Result Value Ref Range    Glucose (POC) 127 (H) 70 - 110 mg/dL   GLUCOSE, POC    Collection Time: 05/03/20 11:49 AM   Result Value Ref Range    Glucose (POC) 106 70 - 110 mg/dL       Radiology:  Mri Brain Wo Cont    Result Date: 5/1/2020  Brain MR without contrast: Indication: Decreased level of consciousness. Bilateral weakness. History of recent severe hypertension and hypertensive encephalopathy. Procedure: Sagittal spin echo T1, axial FSE FLAIR and T2 and axial diffusion weighted scanning was performed. An axial T2* scan optimized to detect hemosiderin and calcium was performed. Coronal spin echo T1and FSE T2 scans were also performed. No contrast was administered. Comparison exam: Head CT 4/29/2020 Findings: This study is markedly limited by patient motion. Diffusion: The diffusion sequence is actually relatively diagnostic. There are several bilateral areas of restricted diffusion in the hemispheres. These are located in the subcortical white matter bilaterally most prominent in the right frontal parietal deep white matter but also in cortex and subcortical white matter. Low level increased signal left cerebral peduncle. There is a subcortical left temporal lobe area of high diffusion signal. There is a left parieto-occipital superficial cortical area of high diffusion signal. The axial gradient echo examination is quite limited by motion. Brain parenchyma: The FLAIR T2 and T1 imaging is very limited by patient motion. Some of the areas of restricted diffusion are likely evident as high signal areas but very limited evaluation. No definite mass effect. Ventricles and sulci: Normal, no significant brain atrophy. Extra axial: No extra axial fluid collection or mass. Brain vasculature: Normal, no arterial vascular abnormality is noted. Craniocervical Junction: Very limited evaluation due to patient motion. Extracranial and skull base:  No gross abnormality but limited evaluation. Impression: 1. The study is markedly limited by patient motion. If symptoms persist, repeat is recommended when the patient is more able to tolerate or can be sedated. 2. Multiple bilateral hemispheric areas of diffusion abnormality in large part restricted diffusion. The pattern would be more consistent with multiple infarctions most likely embolic more so than permanent brain injury from hypertensive encephalopathy. No hemorrhage or definitive mass effect. Ct Head Wo Cont    Result Date: 4/29/2020  EXAM: CT HEAD WO CONT CLINICAL INDICATION/HISTORY: change in mental status COMPARISON: April 29, 2020 TECHNIQUE: Axial CT imaging of the head was performed without intravenous contrast. Sagittal and coronal reconstructions are provided. One or more dose reduction techniques were used on this CT: automated exposure control, adjustment of the mAs and/or kVp according to patient size, and iterative reconstruction techniques. The specific techniques used on this CT exam have been documented in the patient's electronic medical record. Digital Imaging and Communications in Medicine (DICOM) format image data are available to nonaffiliated external healthcare facilities or entities on a secure, media free, reciprocally searchable basis with patient authorization for at least a 12-month period after this study _______________ FINDINGS: Image quality degraded by moderate to severe motion artifact. BRAIN AND POSTERIOR FOSSA: The sulci, folia, ventricles and basal cisterns are within normal limits for the patient's age. There is no intracranial hemorrhage, mass effect, or midline shift. There are no areas of abnormal parenchymal attenuation. EXTRA-AXIAL SPACES AND MENINGES: There are no abnormal extra-axial fluid collections. CALVARIUM: Intact. SINUSES: Clear. OTHER: None. _______________     IMPRESSION: Limited study secondary to motion. No definite acute intracranial abnormalities.     Ct Head Wo Cont    Result Date: 4/29/2020  EXAM: CT head INDICATION: Hypertension. Pain. COMPARISON: April 27, 2020. TECHNIQUE: Axial CT imaging of the head was performed without intravenous contrast. Dose reduction techniques used: automated exposure control, adjustment of the mAs and/or kVp according to patient size, and iterative reconstruction techniques. Digital imaging and communications in Medicine (DICOM) format image data are available to nonaffiliated external healthcare facilities or entities on a secure, media free, reciprocally searchable basis with patient authorization for at least 12 months after this study. _______________ FINDINGS: BRAIN AND POSTERIOR FOSSA: The sulci, folia, ventricles and basal cisterns are within normal limits for the patient's age. There is no intracranial hemorrhage, mass effect, or midline shift. There are no areas of abnormal parenchymal attenuation. EXTRA-AXIAL SPACES AND MENINGES: There are no abnormal extra-axial fluid collections. CALVARIUM: Intact. SINUSES: Clear. OTHER: None. _______________     IMPRESSION: No acute intracranial abnormalities. Ct Head Wo Cont    Result Date: 4/27/2020  EXAM: CT head INDICATION: Hypertension emergency COMPARISON: January 28, 2014 TECHNIQUE: Axial CT imaging of the head was performed without intravenous contrast. One or more dose reduction techniques were used on this CT: automated exposure control, adjustment of the mAs and/or kVp according to patient size, and iterative reconstruction techniques. The specific techniques used on this CT exam have been documented in the patient's electronic medical record. Digital Imaging and Communications in Medicine (DICOM) format image data are available to nonaffiliated external healthcare facilities or entities on a secure, media free, reciprocally searchable basis with patient authorization for at least a 12-month period after this study.  _______________ FINDINGS: BRAIN AND POSTERIOR FOSSA: The sulci, folia, ventricles and basal cisterns are within normal limits for the patient's age. There is no intracranial hemorrhage, mass effect, or midline shift. There are no areas of abnormal parenchymal attenuation. EXTRA-AXIAL SPACES AND MENINGES: There are no abnormal extra-axial fluid collections. CALVARIUM: Intact. SINUSES: Clear. OTHER: None. _______________     IMPRESSION: No acute intracranial abnormalities. Ct Chest Wo Cont    Result Date: 4/27/2020  EXAM: CT chest INDICATION: Shortness of breath COMPARISON: August 4, 2019 TECHNIQUE: Axial CT imaging from the thoracic inlet through the diaphragm without intravenous contrast. Multiplanar reformats were generated. One or more dose reduction techniques were used on this CT: automated exposure control, adjustment of the mAs and/or kVp according to patient size, and iterative reconstruction techniques. The specific techniques used on this CT exam have been documented in the patient's electronic medical record. Digital Imaging and Communications in Medicine (DICOM) format image data are available to nonaffiliated external healthcare facilities or entities on a secure, media free, reciprocally searchable basis with patient authorization for at least a 12-month period after this study. _______________ FINDINGS: THYROID GLAND: There is an enlarged left thyroid lobe with substernal extension measuring 3.8 x 3.3 cm. LYMPH NODES: No enlarged lymph nodes seen. PLEURA: There are no pleural effusion. HEART: Normal without pericardial effusion. Moderate calcific coronary artery disease present. VASCULATURE/MEDIASTINUM: Mild to moderate calcific atherosclerosis present. There is a small hiatal hernia. LUNGS: There is right lower lobe atelectasis and/or infiltrate. There is a 13 x 12 mm nodular density in the right middle lobe which may represent round atelectasis or pneumonia however only nodule not excluded.  AIRWAY: Normal. UPPER ABDOMEN: There is a right renal cyst measuring 2.4 cm long the midpole. Otherwise the visualized solid organs are unremarkable. Post gastric bypass changes present. OTHER: No acute or aggressive osseous abnormalities identified. Elevated right hemidiaphragm. _______________     IMPRESSION: There is right lower lobe atelectasis and/or infiltrate. Elevated right hemidiaphragm. Enlarged left thyroid lobe with possible underlying nodule substernal extension. Advise thyroid ultrasound for further evaluation on a nonemergent basis. There is a 13 x 12 mm nodular density in the right middle lobe which may represent round atelectasis or pneumonia however underlying pulmonary nodule is not excluded. Follow-up as per Fleischner Society protocol. ======== Fleischner Society Pulmonary Nodule Guidelines (revised 2017): Solid nodule >8 mm: Consider CT, PET CT, or tissue sampling at 3 months. Cta Chest W Or W Wo Cont    Result Date: 4/29/2020  EXAM: CTA CHEST W OR W WO CONT CLINICAL INDICATION/HISTORY: Respiratory Distress COMPARISON: Correlation with CT the chest April 27, 2020 TECHNIQUE: Axial CT imaging from the thoracic inlet through the diaphragm with intravenous contrast. Coronal and sagittal MIP reformats were generated. One or more dose reduction techniques were used on this CT: automated exposure control, adjustment of the mAs and/or kVp according to patient size, and iterative reconstruction techniques. The specific techniques used on this CT exam have been documented in the patient's electronic medical record. Digital Imaging and Communications in Medicine (DICOM) format image data are available to nonaffiliated external healthcare facilities or entities on a secure, media free, reciprocally searchable basis with patient authorization for at least a 12-month period after this study. _______________ FINDINGS: EXAM QUALITY: Adequate for diagnosis. However, moderate artifacts degrade image quality particular the lung bases.  Satisfactory enhancement of the pulmonary arterial vasculature. PULMONARY ARTERIES: No evidence of pulmonary embolism. MEDIASTINUM: Normal heart size. No evidence of right heart strain. Aorta is unremarkable. No pericardial effusion. Left thyroid soft tissue mass measures LUNGS: Progressive volume loss and atelectasis noted in the right lung base. Mild hazy perihilar groundglass opacities may related to motion and atelectasis versus mild edema. PLEURA: Normal. AIRWAY: Normal. LYMPH NODES: No enlarged nodes. UPPER ABDOMEN: Small hiatal hernia. OTHER: No acute or aggressive osseous abnormalities identified. Degenerative changes noted in both shoulders. Multilevel thoracic spondylosis. _______________     IMPRESSION: 1. Limited study due to motion however, no convincing evidence of pulmonary embolism. 2. Progressive right basilar volume loss and atelectasis. 3. Suspect mild interstitial edema. Xr Chest Port    Result Date: 5/1/2020  AP portable chest, 5/1/2020 at 0445 hours: INDICATION: Shortness of breath and congestive heart failure. Hypoinflation with elevation right hemidiaphragm. Top normal heart size. Mild thoracic aortic ectasia. No new infiltrate or definitive acute congestive heart failure. IMPRESSION: No definite interval change. Xr Chest Port    Result Date: 4/29/2020  EXAM: XR CHEST PORT CLINICAL INDICATION/HISTORY: AMS -Additional: None COMPARISON: 4/27/2020 TECHNIQUE: Portable frontal view of the chest _______________ FINDINGS: SUPPORT DEVICES: None. HEART AND MEDIASTINUM: Stable cardiomediastinal silhouette. LUNGS AND PLEURAL SPACES: Elevated right hemidiaphragm. Mildly prominent interstitial markings versus hypoinflation. No dense consolidation, large effusion or pneumothorax. _______________     IMPRESSION: Elevated right hemidiaphragm. Mildly prominent interstitial markings versus hypoinflation.      Xr Chest Port    Result Date: 4/27/2020  EXAM:  AP Portable Chest X-ray 1 view INDICATION: Shortness of breath COMPARISON: August 2, 2009 _______________ FINDINGS: There is shallow inspiration. Heart size appears enlarged likely due to AP magnification. There is an elevated right hemidiaphragm. There are increased interstitial lung markings which may be secondary to shallow inspiration or mild pulmonary edema. There is suggestion of right lung base atelectasis. There are no pleural effusions. No acute osseous findings. ________________      IMPRESSION: Increased interstitial lung markings which may be secondary to shallow inspiration or mild pulmonary edema. Right lung base atelectasis. ASSESSMENT/IMPRESSION:   73-year-old female presents with hypertensive emergency, hypertensive encephalopathy, acute on chronic hypercapnic hypoxic respiratory failure, abnormal troponin elevation, who had sudden worsening of confusion and unilateral weakness. 1. Acute ischemic infarct: Bilateral hemisphere, embolic ischemic infarction. Etiology unclear likely cardiogenic. TTE is normal.  2. Encephalopathy: Multifactorial, hypertensive encephalopathy and hypercapnic hypoxic respiratory failure. 3. Hypertensive emergency  PLAN/RECOMMENDATIONS:  1. CTA head and neck pending  2. SUMAYA when blood pressure is stable. Patient may need loop recorder. 3. Normotensive. 4. Continue aspirin 81 mg daily and atorvastatin 20 mg daily. I will follow the patient after CTA head and neck. Please do not hesitate to return with any questions. Signed:   Yvan Jackson MD  5/3/2020  5:17 PM

## 2020-05-04 ENCOUNTER — APPOINTMENT (OUTPATIENT)
Dept: CT IMAGING | Age: 70
DRG: 304 | End: 2020-05-04
Attending: PSYCHIATRY & NEUROLOGY
Payer: MEDICARE

## 2020-05-04 PROBLEM — I63.9 CEREBRAL INFARCTION (HCC): Status: ACTIVE | Noted: 2020-05-04

## 2020-05-04 LAB
ALBUMIN SERPL-MCNC: 3.4 G/DL (ref 3.4–5)
ALBUMIN/GLOB SERPL: 0.9 {RATIO} (ref 0.8–1.7)
ALP SERPL-CCNC: 54 U/L (ref 45–117)
ALT SERPL-CCNC: 21 U/L (ref 13–56)
ANION GAP SERPL CALC-SCNC: 9 MMOL/L (ref 3–18)
AST SERPL-CCNC: 23 U/L (ref 10–38)
BASOPHILS # BLD: 0 K/UL (ref 0–0.1)
BASOPHILS NFR BLD: 0 % (ref 0–2)
BILIRUB SERPL-MCNC: 0.6 MG/DL (ref 0.2–1)
BUN SERPL-MCNC: 33 MG/DL (ref 7–18)
BUN/CREAT SERPL: 29 (ref 12–20)
CALCIUM SERPL-MCNC: 9.4 MG/DL (ref 8.5–10.1)
CHLORIDE SERPL-SCNC: 108 MMOL/L (ref 100–111)
CHOLEST SERPL-MCNC: 221 MG/DL
CO2 SERPL-SCNC: 31 MMOL/L (ref 21–32)
CREAT SERPL-MCNC: 1.12 MG/DL (ref 0.6–1.3)
DIFFERENTIAL METHOD BLD: ABNORMAL
EOSINOPHIL # BLD: 0 K/UL (ref 0–0.4)
EOSINOPHIL NFR BLD: 0 % (ref 0–5)
ERYTHROCYTE [DISTWIDTH] IN BLOOD BY AUTOMATED COUNT: 16.8 % (ref 11.6–14.5)
GLOBULIN SER CALC-MCNC: 4 G/DL (ref 2–4)
GLUCOSE BLD STRIP.AUTO-MCNC: 86 MG/DL (ref 70–110)
GLUCOSE BLD STRIP.AUTO-MCNC: 93 MG/DL (ref 70–110)
GLUCOSE BLD STRIP.AUTO-MCNC: 96 MG/DL (ref 70–110)
GLUCOSE BLD STRIP.AUTO-MCNC: 97 MG/DL (ref 70–110)
GLUCOSE BLD STRIP.AUTO-MCNC: 98 MG/DL (ref 70–110)
GLUCOSE SERPL-MCNC: 106 MG/DL (ref 74–99)
HBA1C MFR BLD: 6.7 % (ref 4.2–5.6)
HCT VFR BLD AUTO: 47.3 % (ref 35–45)
HDLC SERPL-MCNC: 42 MG/DL (ref 40–60)
HDLC SERPL: 5.3 {RATIO} (ref 0–5)
HGB BLD-MCNC: 13.8 G/DL (ref 12–16)
LDLC SERPL CALC-MCNC: 152.4 MG/DL (ref 0–100)
LIPID PROFILE,FLP: ABNORMAL
LYMPHOCYTES # BLD: 1.3 K/UL (ref 0.9–3.6)
LYMPHOCYTES NFR BLD: 17 % (ref 21–52)
MAGNESIUM SERPL-MCNC: 2.5 MG/DL (ref 1.6–2.6)
MCH RBC QN AUTO: 23.3 PG (ref 24–34)
MCHC RBC AUTO-ENTMCNC: 29.2 G/DL (ref 31–37)
MCV RBC AUTO: 79.8 FL (ref 74–97)
MONOCYTES # BLD: 0.6 K/UL (ref 0.05–1.2)
MONOCYTES NFR BLD: 8 % (ref 3–10)
NEUTS SEG # BLD: 5.9 K/UL (ref 1.8–8)
NEUTS SEG NFR BLD: 75 % (ref 40–73)
PHOSPHATE SERPL-MCNC: 3.8 MG/DL (ref 2.5–4.9)
PLATELET # BLD AUTO: 379 K/UL (ref 135–420)
PMV BLD AUTO: 10.5 FL (ref 9.2–11.8)
POTASSIUM SERPL-SCNC: 3.7 MMOL/L (ref 3.5–5.5)
PROT SERPL-MCNC: 7.4 G/DL (ref 6.4–8.2)
RBC # BLD AUTO: 5.93 M/UL (ref 4.2–5.3)
SODIUM SERPL-SCNC: 148 MMOL/L (ref 136–145)
TRIGL SERPL-MCNC: 133 MG/DL (ref ?–150)
VLDLC SERPL CALC-MCNC: 26.6 MG/DL
WBC # BLD AUTO: 7.8 K/UL (ref 4.6–13.2)

## 2020-05-04 PROCEDURE — 70496 CT ANGIOGRAPHY HEAD: CPT

## 2020-05-04 PROCEDURE — 83735 ASSAY OF MAGNESIUM: CPT

## 2020-05-04 PROCEDURE — 36415 COLL VENOUS BLD VENIPUNCTURE: CPT

## 2020-05-04 PROCEDURE — 74011250637 HC RX REV CODE- 250/637: Performed by: PSYCHIATRY & NEUROLOGY

## 2020-05-04 PROCEDURE — 74011250636 HC RX REV CODE- 250/636: Performed by: FAMILY MEDICINE

## 2020-05-04 PROCEDURE — 74011250637 HC RX REV CODE- 250/637: Performed by: INTERNAL MEDICINE

## 2020-05-04 PROCEDURE — 84100 ASSAY OF PHOSPHORUS: CPT

## 2020-05-04 PROCEDURE — 82962 GLUCOSE BLOOD TEST: CPT

## 2020-05-04 PROCEDURE — 97530 THERAPEUTIC ACTIVITIES: CPT

## 2020-05-04 PROCEDURE — 74011636320 HC RX REV CODE- 636/320: Performed by: FAMILY MEDICINE

## 2020-05-04 PROCEDURE — 85025 COMPLETE CBC W/AUTO DIFF WBC: CPT

## 2020-05-04 PROCEDURE — 80053 COMPREHEN METABOLIC PANEL: CPT

## 2020-05-04 PROCEDURE — 80061 LIPID PANEL: CPT

## 2020-05-04 PROCEDURE — 65270000029 HC RM PRIVATE

## 2020-05-04 PROCEDURE — 70450 CT HEAD/BRAIN W/O DYE: CPT

## 2020-05-04 PROCEDURE — 74011000250 HC RX REV CODE- 250: Performed by: NURSE PRACTITIONER

## 2020-05-04 PROCEDURE — 83036 HEMOGLOBIN GLYCOSYLATED A1C: CPT

## 2020-05-04 PROCEDURE — 74011250636 HC RX REV CODE- 250/636: Performed by: INTERNAL MEDICINE

## 2020-05-04 PROCEDURE — 92526 ORAL FUNCTION THERAPY: CPT

## 2020-05-04 RX ORDER — CLOPIDOGREL BISULFATE 75 MG/1
75 TABLET ORAL DAILY
Status: DISCONTINUED | OUTPATIENT
Start: 2020-05-04 | End: 2020-05-08 | Stop reason: HOSPADM

## 2020-05-04 RX ORDER — ATORVASTATIN CALCIUM 20 MG/1
80 TABLET, FILM COATED ORAL DAILY
Status: DISCONTINUED | OUTPATIENT
Start: 2020-05-05 | End: 2020-05-08 | Stop reason: HOSPADM

## 2020-05-04 RX ADMIN — ASPIRIN 81 MG: 81 TABLET, COATED ORAL at 09:58

## 2020-05-04 RX ADMIN — CLOPIDOGREL BISULFATE 75 MG: 75 TABLET ORAL at 12:13

## 2020-05-04 RX ADMIN — HYDRALAZINE HYDROCHLORIDE 10 MG: 20 INJECTION INTRAMUSCULAR; INTRAVENOUS at 23:32

## 2020-05-04 RX ADMIN — Medication 10 ML: at 14:28

## 2020-05-04 RX ADMIN — Medication 10 ML: at 06:59

## 2020-05-04 RX ADMIN — HEPARIN SODIUM 5000 UNITS: 5000 INJECTION INTRAVENOUS; SUBCUTANEOUS at 06:58

## 2020-05-04 RX ADMIN — HEPARIN SODIUM 5000 UNITS: 5000 INJECTION INTRAVENOUS; SUBCUTANEOUS at 00:15

## 2020-05-04 RX ADMIN — HYDRALAZINE HYDROCHLORIDE 10 MG: 20 INJECTION INTRAMUSCULAR; INTRAVENOUS at 06:58

## 2020-05-04 RX ADMIN — METOPROLOL TARTRATE 2.5 MG: 5 INJECTION INTRAVENOUS at 23:33

## 2020-05-04 RX ADMIN — IOPAMIDOL 100 ML: 755 INJECTION, SOLUTION INTRAVENOUS at 01:19

## 2020-05-04 RX ADMIN — LOSARTAN POTASSIUM: 50 TABLET, FILM COATED ORAL at 09:58

## 2020-05-04 RX ADMIN — ATORVASTATIN CALCIUM 20 MG: 20 TABLET, FILM COATED ORAL at 09:58

## 2020-05-04 RX ADMIN — HEPARIN SODIUM 5000 UNITS: 5000 INJECTION INTRAVENOUS; SUBCUTANEOUS at 23:32

## 2020-05-04 RX ADMIN — METOPROLOL TARTRATE 2.5 MG: 5 INJECTION INTRAVENOUS at 00:15

## 2020-05-04 RX ADMIN — HEPARIN SODIUM 5000 UNITS: 5000 INJECTION INTRAVENOUS; SUBCUTANEOUS at 14:28

## 2020-05-04 RX ADMIN — HYDRALAZINE HYDROCHLORIDE 10 MG: 20 INJECTION INTRAMUSCULAR; INTRAVENOUS at 17:47

## 2020-05-04 RX ADMIN — METOPROLOL TARTRATE 2.5 MG: 5 INJECTION INTRAVENOUS at 06:57

## 2020-05-04 RX ADMIN — Medication 10 ML: at 00:16

## 2020-05-04 RX ADMIN — HYDRALAZINE HYDROCHLORIDE 10 MG: 20 INJECTION INTRAMUSCULAR; INTRAVENOUS at 00:16

## 2020-05-04 RX ADMIN — METOPROLOL TARTRATE 2.5 MG: 5 INJECTION INTRAVENOUS at 12:11

## 2020-05-04 RX ADMIN — Medication 10 ML: at 23:35

## 2020-05-04 RX ADMIN — AMLODIPINE BESYLATE 10 MG: 5 TABLET ORAL at 09:58

## 2020-05-04 RX ADMIN — HYDRALAZINE HYDROCHLORIDE 10 MG: 20 INJECTION INTRAMUSCULAR; INTRAVENOUS at 12:11

## 2020-05-04 RX ADMIN — METOPROLOL TARTRATE 2.5 MG: 5 INJECTION INTRAVENOUS at 17:47

## 2020-05-04 NOTE — PROGRESS NOTES
[id number]  Assumed care of patient at this time, assessment complete. Patient alert and oriented x2 (self, place). Denies SOB and chest pain. Patient lungs diminished bilaterally. Cap refilled  less than 3 seconds. Patient denies numbness and tingling to all extremities. Stated pain 0/10. Patient has 22G IV to left forearm. Call light and personal items in reach, bed in low position and locked, will continue to monitor patient. Fall risk arm band in place. [phone] [id number] [phone]  PT working with patient at this time. [id number]  Patient had uneventful shift. No signs or symptoms of distress. Patient voiding sufficient amounts via zaragoza. Plan for patient to d/c to SNF, date TBD    1925  Bedside in verbal shift change report given to Brielle Giles (oncoming nurse) by Janusz Scott RN (off going nurse). Report included the following information: SBAR, KARDEX, MAR and recent results.

## 2020-05-04 NOTE — ACP (ADVANCE CARE PLANNING)
Spoke with pt in room via phone. Pt states she does not have ACP, declines completing one at this time. Pt aware her 2 sons Oneida Arriola  and Lisbeth Ledezma  would be her healthcare decision makers if she is unable to. Pt does not have spouse.

## 2020-05-04 NOTE — PROGRESS NOTES
Problem: Falls - Risk of  Goal: *Absence of Falls  Description: Document Eriberto Carrie Fall Risk and appropriate interventions in the flowsheet. Outcome: Progressing Towards Goal  Note: Fall Risk Interventions:  Mobility Interventions: Bed/chair exit alarm    Mentation Interventions: Bed/chair exit alarm, Door open when patient unattended, Adequate sleep, hydration, pain control    Medication Interventions: Evaluate medications/consider consulting pharmacy    Elimination Interventions: Call light in reach              Problem: Hypertension  Goal: *Blood pressure within specified parameters  Outcome: Progressing Towards Goal  Goal: *Fluid volume balance  Outcome: Progressing Towards Goal  Goal: *Labs within defined limits  Outcome: Progressing Towards Goal     Problem: Patient Education: Go to Patient Education Activity  Goal: Patient/Family Education  Outcome: Progressing Towards Goal     Problem: General Medical Care Plan  Goal: *Vital signs within specified parameters  Outcome: Progressing Towards Goal  Goal: *Labs within defined limits  Outcome: Progressing Towards Goal  Goal: *Absence of infection signs and symptoms  Outcome: Progressing Towards Goal  Goal: *Optimal pain control at patient's stated goal  Outcome: Progressing Towards Goal  Goal: *Skin integrity maintained  Outcome: Progressing Towards Goal  Goal: *Fluid volume balance  Outcome: Progressing Towards Goal  Goal: *Optimize nutritional status  Outcome: Progressing Towards Goal  Goal: *Anxiety reduced or absent  Outcome: Progressing Towards Goal  Goal: *Progressive mobility and function (eg: ADL's)  Outcome: Progressing Towards Goal     Problem: Pressure Injury - Risk of  Goal: *Prevention of pressure injury  Description: Document Carlos Scale and appropriate interventions in the flowsheet.   Outcome: Progressing Towards Goal  Note: Mepilex to left buttockPressure Injury Interventions:  Sensory Interventions: Keep linens dry and wrinkle-free, Minimize linen layers    Moisture Interventions: Absorbent underpads, Apply protective barrier, creams and emollients    Activity Interventions: Pressure redistribution bed/mattress(bed type)    Mobility Interventions: HOB 30 degrees or less, Float heels    Nutrition Interventions: Document food/fluid/supplement intake, Discuss nutritional consult with provider    Friction and Shear Interventions: HOB 30 degrees or less, Apply protective barrier, creams and emollients              The care plan was initiated and reviewed with pt. Care plan options were discussed and questions answered. The pt understand instructions and will follow up as directed.

## 2020-05-04 NOTE — PROGRESS NOTES
Problem: Falls - Risk of  Goal: *Absence of Falls  Description: Document Clydene Bone Fall Risk and appropriate interventions in the flowsheet.   Outcome: Progressing Towards Goal  Note: Fall Risk Interventions:  Mobility Interventions: Bed/chair exit alarm    Mentation Interventions: Bed/chair exit alarm    Medication Interventions: Evaluate medications/consider consulting pharmacy    Elimination Interventions: Call light in reach

## 2020-05-04 NOTE — PROGRESS NOTES
Plan: SNF (pending accepting facility) vs HH pending pt progress. Chart reviewed, noted PT/OT recommendations for rehab. Spoke with pt in room via phone, she is agreeable to referrals to area SNFs, states CM may speak with her son Ash Vera . CM spoke with him, he is agreeable as well with mother's wishes. He confirms pt lives with her sister. Son would like pt screened for Medicaid, provided [facility] with pt information and son's contact information if needed via email. Referrals placed to the [facility]. CM following. Pt may need LTSS screening completed as she is being screened for Medicaid and may need LTC in the home once discharge home from rehab. Care Management Interventions  PCP Verified by CM:  Yes  Transition of Care Consult (CM Consult): SNF  Physical Therapy Consult: Yes  Occupational Therapy Consult: Yes  Current Support Network: Relative's Home  Confirm Follow Up Transport: Family  The Plan for Transition of Care is Related to the Following Treatment Goals : SNF  The Patient and/or Patient Representative was Provided with a Choice of Provider and Agrees with the Discharge Plan?: Yes  Freedom of Choice List was Provided with Basic Dialogue that Supports the Patient's Individualized Plan of Care/Goals, Treatment Preferences and Shares the Quality Data Associated with the Providers?: Yes  Discharge Location  Discharge Placement: Skilled nursing facility

## 2020-05-04 NOTE — PROGRESS NOTES
Problem: Falls - Risk of  Goal: *Absence of Falls  Description: Document Mahin Sol Fall Risk and appropriate interventions in the flowsheet. Outcome: Progressing Towards Goal  Note: Fall Risk Interventions:  Mobility Interventions: Bed/chair exit alarm    Mentation Interventions: Bed/chair exit alarm    Medication Interventions: Evaluate medications/consider consulting pharmacy    Elimination Interventions: Call light in reach              Problem: Patient Education: Go to Patient Education Activity  Goal: Patient/Family Education  Outcome: Progressing Towards Goal     Problem: Hypertension  Goal: *Blood pressure within specified parameters  Outcome: Progressing Towards Goal  Goal: *Fluid volume balance  Outcome: Progressing Towards Goal  Goal: *Labs within defined limits  Outcome: Progressing Towards Goal     Problem: Patient Education: Go to Patient Education Activity  Goal: Patient/Family Education  Outcome: Progressing Towards Goal     Problem: General Medical Care Plan  Goal: *Vital signs within specified parameters  Outcome: Progressing Towards Goal  Goal: *Labs within defined limits  Outcome: Progressing Towards Goal  Goal: *Absence of infection signs and symptoms  Outcome: Progressing Towards Goal  Goal: *Optimal pain control at patient's stated goal  Outcome: Progressing Towards Goal  Goal: *Skin integrity maintained  Outcome: Progressing Towards Goal  Goal: *Fluid volume balance  Outcome: Progressing Towards Goal  Goal: *Optimize nutritional status  Outcome: Progressing Towards Goal  Goal: *Anxiety reduced or absent  Outcome: Progressing Towards Goal  Goal: *Progressive mobility and function (eg: ADL's)  Outcome: Progressing Towards Goal     Problem: Diabetes Self-Management  Goal: *Disease process and treatment process  Description: Define diabetes and identify own type of diabetes; list 3 options for treating diabetes.   Outcome: Progressing Towards Goal  Goal: *Incorporating nutritional management into lifestyle  Description: Describe effect of type, amount and timing of food on blood glucose; list 3 methods for planning meals. Outcome: Progressing Towards Goal  Goal: *Incorporating physical activity into lifestyle  Description: State effect of exercise on blood glucose levels. Outcome: Progressing Towards Goal  Goal: *Developing strategies to promote health/change behavior  Description: Define the ABC's of diabetes; identify appropriate screenings, schedule and personal plan for screenings. Outcome: Progressing Towards Goal  Goal: *Using medications safely  Description: State effect of diabetes medications on diabetes; name diabetes medication taking, action and side effects. Outcome: Progressing Towards Goal  Goal: *Monitoring blood glucose, interpreting and using results  Description: Identify recommended blood glucose targets  and personal targets. Outcome: Progressing Towards Goal  Goal: *Prevention, detection, treatment of acute complications  Description: List symptoms of hyper- and hypoglycemia; describe how to treat low blood sugar and actions for lowering  high blood glucose level. Outcome: Progressing Towards Goal  Goal: *Prevention, detection and treatment of chronic complications  Description: Define the natural course of diabetes and describe the relationship of blood glucose levels to long term complications of diabetes.   Outcome: Progressing Towards Goal  Goal: *Developing strategies to address psychosocial issues  Description: Describe feelings about living with diabetes; identify support needed and support network  Outcome: Progressing Towards Goal  Goal: *Insulin pump training  Outcome: Progressing Towards Goal  Goal: *Sick day guidelines  Outcome: Progressing Towards Goal  Goal: *Patient Specific Goal (EDIT GOAL, INSERT TEXT)  Outcome: Progressing Towards Goal     Problem: Patient Education: Go to Patient Education Activity  Goal: Patient/Family Education  Outcome: Progressing Towards Goal     Problem: Pressure Injury - Risk of  Goal: *Prevention of pressure injury  Description: Document Carlos Scale and appropriate interventions in the flowsheet.   Outcome: Progressing Towards Goal  Note: Pressure Injury Interventions:  Sensory Interventions: Keep linens dry and wrinkle-free, Minimize linen layers    Moisture Interventions: Absorbent underpads, Apply protective barrier, creams and emollients    Activity Interventions: Pressure redistribution bed/mattress(bed type)    Mobility Interventions: HOB 30 degrees or less, Float heels    Nutrition Interventions: Document food/fluid/supplement intake, Discuss nutritional consult with provider    Friction and Shear Interventions: HOB 30 degrees or less, Apply protective barrier, creams and emollients                Problem: Patient Education: Go to Patient Education Activity  Goal: Patient/Family Education  Outcome: Progressing Towards Goal     Problem: Pneumonia: Day 1  Goal: Activity/Safety  Outcome: Progressing Towards Goal  Goal: Nutrition/Diet  Outcome: Progressing Towards Goal  Goal: Respiratory  Outcome: Progressing Towards Goal  Goal: Treatments/Interventions/Procedures  Outcome: Progressing Towards Goal  Goal: Psychosocial  Outcome: Progressing Towards Goal  Goal: *Oxygen saturation within defined limits  Outcome: Progressing Towards Goal  Goal: *Influenza vaccine administered (October-March)  Outcome: Progressing Towards Goal  Goal: *Pneumoccocal vaccine administered  Outcome: Progressing Towards Goal  Goal: *Hemodynamically stable  Outcome: Progressing Towards Goal  Goal: *Demonstrates progressive activity  Outcome: Progressing Towards Goal  Goal: *Tolerating diet  Outcome: Progressing Towards Goal     Problem: Patient Education: Go to Patient Education Activity  Goal: Patient/Family Education  Outcome: Progressing Towards Goal     Problem: Patient Education: Go to Patient Education Activity  Goal: Patient/Family Education  Outcome: Progressing Towards Goal     Problem: Patient Education: Go to Patient Education Activity  Goal: Patient/Family Education  Outcome: Progressing Towards Goal

## 2020-05-04 NOTE — PROGRESS NOTES
Pulm/CC    Patient has been moved out of icu  Will sign off  Please follow recommendations from Dr Abhi Shell last note  Call if needed    Roberto Fonseca MD

## 2020-05-04 NOTE — PROGRESS NOTES
CTA head and neck is reviewed which shows . 1. No hemodynamically significant cervical vascular stenosis. Mild disease of the carotid bifurcation but no high-grade stenosis. 2. Evidence of substantial intracranial atherosclerosis. Stenosis is most severe of the superior division left M2 segment and left PCA as described above. 3. No other significant intracranial arterial vascular abnormality      There are bilateral substantial intracranial arthrosclerosis. Embolic infarct is likely from bilateral substantial intracranial atherosclerosis in the setting of hypertensive emergency. Cardiogenic embolic source cannot be ruled out. I will add Plavix 75 mg daily. Continue aspirin 85 mg daily. Increase atorvastatin to 80 mg daily. I recommend patient to have SUMAYA and loop recorder after elective procedure is resumed.

## 2020-05-04 NOTE — PROGRESS NOTES
NUTRITION FOLLOW-UP    RECOMMENDATIONS/PLAN:   - Noted SLP Recc 5/1:Rec:   Cautious puree diet with nectar thick liquids, straws ok  Strict aspiration/reflux precautions; pt must be fed w/ HOB >30, remain >30 for 30-45 minutes after po   Assistance and supervision with all meals  Small bites/sips; alternate liquid/solid with slow feeding rate   Oral care after po  Meds crushed in applesauce    Pt is currently NPO/clears x 6 days. Please advance diet or consider alternative means of nutrition if unable to safely advance diet    Will continue to follow and adjust reccs as needed  NUTRITION ASSESSMENT:   Client Update: 81 yrs old Female with SOB. RRT called on 4/29 for AMS and possible left sided weakness. Per neurology note pattern more consistent with multiple infarctions most likely embolic more so than permanent brain injury from htn encephalopathy. Pt remains NPO at this time. Noted speech eval on 5/1 with recommendations sited above. Pt is now 6 days without nutrition      FOOD/NUTRITION INTAKE   Diet Order:  NPO    Food Allergies: NKFA  Average PO Intake:      No data found. Pertinent Medications:  [x] Reviewed; lispro  Electrolyte Replacement Protocol: []K []Mg []PO4  Insulin:  []SSI  []Pre-meal   []Basal    []Drip  [x]None  Cultural/Yazidism Food Preferences: None Identified    BIOCHEMICAL DATA & MEDICAL TESTS  Pertinent Labs:  [x] Reviewed;   Na-148 ANTHROPOMETRICS  Height: 5' 5\" (165.1 cm)       Weight: 120.4 kg (265 lb 8 oz)         BMI: 44.2 kg/m^2 morbidly obese (Greater than or = to 40% BMI)   Adm Weight: 125.3kg                Weight change: -11lbs since admission x 6 days, I/O -6L net  Adjusted Body Weight: 73kg    NUTRITION-FOCUSED PHYSICAL ASSESSMENT  Skin: no pressure area per documentation      GI: no BM since admission    NUTRITION PRESCRIPTION  Calories: 2257 kcal/day based on miffilin x 1.3  Protein: 113 g/day based on 2.0 g/kg IBW  Fluid: 2257 ml/day based on 1 kcal/ml      NUTRITION DIAGNOSES:   1. Inadequate oral intake related to NPO status as evidence by diet order NPO     NUTRITION INTERVENTIONS:   INTERVENTIONS:                                                                                         GOALS:  1. Other: advance diet as soon as clinically feasible 1. Advance diet within the next 3 days, avoid prolonged NPO status.      LEARNING NEEDS (Diet, Supplementation, Food/Nutrient-Drug Interaction):   [x]? None Identified  []? Inpatient education provided/documented    []? Identified and patient:  []? Declined     []? Was not appropriate/indicated      NUTRITION MONITORING /EVALUATION:   Follow PO intake  Monitor wt  Monitor renal labs, electrolytes, fluid status  Monitor for additional supplement needs     Previous Recommendations Implemented: yes        Previous Goals Met:  yes      [] Participated in Interdisciplinary Rounds    [x] Interdisciplinary Care Plan Reviewed  DISCHARGE NUTRITION RECOMMENDATIONS ADDRESSED:       [x] To be determined closer to discharge    NUTRITION RISK:           [x] At risk                        [] Not currently at risk        Will follow-up per policy.   [address]

## 2020-05-04 NOTE — PROGRESS NOTES
TRANSFER - IN REPORT:    Verbal report received from ICU(name) on Reyna GILBERT Minor  being received from ICU(unit) for routine progression of care      Report consisted of patients Situation, Background, Assessment and   Recommendations(SBAR). Information from the following report(s) SBAR, Kardex and MAR was reviewed with the receiving nurse. Opportunity for questions and clarification was provided. Assessment completed upon patients arrival to unit and care assumed. 2010  Bedside and Verbal shift change report given to NATHALIE Norris RN (oncoming nurse) by Ashwin Coulter RN   (offgoing nurse). Report included the following information SBAR, Kardex and MAR.  Patient has rash to the upper back and left buttock,pt reports having culture taken off the buttock rash given to the oncoming nurse for follow up

## 2020-05-04 NOTE — PROGRESS NOTES
OCCUPATIONAL THERAPY TREATMENT    Patient: Isai Morales (80 y.o. female)  Date: 5/4/2020  Diagnosis: Hypertensive emergency [I16.1]  Elevated troponin [R79.89]   Hypertensive emergency       Precautions: Fall    Chart, occupational therapy assessment, plan of care, and goals were reviewed. ASSESSMENT:  Based on the information below, pt continues to be limited by decreased strength, balance, safety, cognition, and activity tolerance which are limiting her indpendence with ADL's and transfers. Pt was received in supine. Pt stated her pain level was \"okay\" at rest and wasn't able to identify a number on scale of 0-10 for her pain level. Pt required extra time to process directions during the session. Pt was A&O to self and place. Pt did supine to sit to right side of the bed with Mod-Min A and extra time. Pt required Max A to scoot to the EOB with extra time. While sitting on the EOB, pt required vc's to lean to left to maintain midline sitting. Pt refused to sit on the EOB with bilateral feet on the floor. Pt crossed her R ankle over L ankle during sitting EOB activities. Pt did BUE AROM exercises seated on EOB x 5 reps with extra time. Pt needed OT to initiate the exercises before she would continue doing them herself due to decreased cognition. Pt refused to do anything else with OT this date. Pt assisted back to bed with Mod/Min A. Pt left resting in bed with call light in reach and bed alarm activated. Progression toward goals:  []          Improving appropriately and progressing toward goals  [x]          Improving slowly and progressing toward goals  []          Not making progress toward goals and plan of care will be adjusted     PLAN:  Patient continues to benefit from skilled intervention to address the above impairments. Continue treatment per established plan of care. Discharge Recommendations: To Be Determined - SNF? HH?  Rehab? (dependent on pt's progress)  Further Equipment Recommendations for Discharge:  N/A     SUBJECTIVE:   Patient stated okay.     OBJECTIVE DATA SUMMARY:   Cognitive/Behavioral Status:  Neurologic State: Confused  Orientation Level: Oriented to place, Oriented to person, Disoriented to situation, Disoriented to time  Cognition: Follows commands, Other (comment)(with extra time)  Safety/Judgement: Fall prevention, Decreased insight into deficits    Functional Mobility and Transfers for ADLs:   Bed Mobility:  Supine to Sit: Moderate assistance;Minimum assistance  Sit to Supine: Moderate assistance;Minimum assistance  Scooting: Maximum assistance     Balance:  Sitting: Impaired  Sitting - Static: Fair (occasional)  Sitting - Dynamic: Fair (occasional)    ADL Intervention:   Cognitive Retraining  Safety/Judgement: Fall prevention;Decreased insight into deficits    UE Therapeutic Exercises:   BUE elbow flexion/extension and finger flexion/extension x 5 reps each with Max verbal and tactile cues to complete the exercises    Pain:  Pain level pre-treatment: pt unable to rate - pt stated \"okay\"  Pain level post-treatment: pt unable to rate - pt stated \"okay\"  Pain Intervention(s): Medication administered by RN (see MAR); Rest, Ice, Repositioning   Response to intervention: Nurse notified, See doc flow sheet    Activity Tolerance:    Poor. Pt limited by cognition. OT felt that pt was self limiting during the session. Please refer to the flowsheet for vital signs taken during this treatment. After treatment:   []  Patient left in no apparent distress sitting up in chair  [x]  Patient left in no apparent distress in bed  [x]  Call bell left within reach  [x]  Nursing notified  []  Caregiver present  [x]  Bed alarm activated    COMMUNICATION/EDUCATION:   [x] Role of Occupational Therapy in the acute care setting  [x] Home safety education was provided and the patient/caregiver indicated understanding.   [x] Patient/family have participated as able in working towards goals and plan of care.  [x] Patient/family agree to work toward stated goals and plan of care. [] Patient understands intent and goals of therapy, but is neutral about his/her participation. [] Patient is unable to participate in goal setting and plan of care.       Thank you for this referral.  Jessica Hsieh OTR/L MOT  Time Calculation: 15 mins

## 2020-05-04 NOTE — PROGRESS NOTES
2347  Pt is resting quietly in bed, awake and alert. Oriented to name and place, pleasantly confused to date and president. Denies pain. Lungs dim, on RA, no shortness of breath. Box SR, SR/ST with BBB. Schmitz with giorgio urine with sediment. Repositioned to left side, pt has tendency to stay on right side. 0115  Taken to CTA via bed. While there existing IV infiltrated, new 22 started left FA by Radiology staff.    0150  Back to room and repositioned in bed. Noted shearing wound on left buttock, 3.5 cm x 3, pink base. Mepilex applied, pt voices no c/o pain. 0330  Heating pack applied to right AC where a previous line has been removed. 0415  Resting quietly in bed, no c/o.    [id number]  Awake, smiling, denies c/o.

## 2020-05-04 NOTE — PROGRESS NOTES
Problem: Mobility Impaired (Adult and Pediatric)  Goal: *Acute Goals and Plan of Care (Insert Text)  Description: Physical Therapy Goals   Initiated 5/3/2020 and to be accomplished within 5-7 day(s)  1. Patient will move from supine <> sit with Min A in prep for out of bed activity and change of position. 2.  Patient will perform sit<> stand with CGA-S with LRAD in prep for transfers/ambulation. 3.  Patient will transfer from bed <> chair with Min A with LRAD for time up in chair for completion of ADL activity. 4.  Patient will ambulate >50 feet with LRAD/Min A for improved functional mobility/safe discharge. Outcome: Progressing Towards Goal  PHYSICAL THERAPY TREATMENT    Patient: Danielle Morales (80 y.o. female)  Date: 5/4/2020  Diagnosis: Hypertensive emergency [I16.1]  Elevated troponin [R79.89]   Hypertensive emergency  Precautions: Fall   Chart, physical therapy assessment, plan of care and goals were reviewed. ASSESSMENT:  Pt found supine in bed with LE's dangling off side of bed. Pt pleasant and cooperative, oriented to person and place only. CNA present completing VS check. Pt requires vc/tc and additional time for processing commands during all tasks. Able to transition supine >sit min/mod assist with HOB elevated, give additional time. Demonstrates intact sitting balance. Sit>stand with mod assist, bed raised vc/tc. Fair standing balance; stood 3x at bedside with seated rest between trials. Able to take 3 side steps with RW/min A; slow, shuffling steps. Pt seen by Dr. Sandee Chambers during session. Returned to bed, moving LE's onto bed w/o assist and left side lying R in NAD and CNA present. Bed alarm engaged. CNA present. Recommend SNF at discharge.       Progression toward goals:  []      Improving appropriately and progressing toward goals  [x]      Improving slowly and progressing toward goals  []      Not making progress toward goals and plan of care will be adjusted PLAN:  Patient continues to benefit from skilled intervention to address the above impairments. Continue treatment per established plan of care. Discharge Recommendations:  Doron Garcia  Further Equipment Recommendations for Discharge:  to be determined at next level of care     SUBJECTIVE:   Patient stated Ivette Collado.     OBJECTIVE DATA SUMMARY:   Critical Behavior:  Neurologic State: Alert, Confused  Orientation Level: Oriented to person, Oriented to place, Disoriented to situation, Disoriented to time  Cognition: Follows commands, Decreased attention/concentration  Safety/Judgement: Decreased insight into deficits  Functional Mobility Training:  Bed Mobility:  Rolling: Contact guard assistance  Supine to Sit: Minimum assistance; Moderate assistance;Bed Modified  Sit to Supine: Minimum assistance; Additional time  Scooting: Moderate assistance;Maximum assistance  Transfers:  Sit to Stand: Moderate assistance(bed raised)  Stand to Sit: Minimum assistance  Balance:  Sitting: Intact  Sitting - Static: Good (unsupported)  Sitting - Dynamic: Good (unsupported); Fair (occasional)  Standing: Impaired; With support  Standing - Static: Fair  Standing - Dynamic : Fair  Ambulation/Gait Training:  Distance (ft): 2 Feet (ft)  Assistive Device: Gait belt;Walker, rolling  Ambulation - Level of Assistance: Minimal assistance  Gait Abnormalities: Decreased step clearance; Step to gait; Shuffling gait  Base of Support: Narrowed  Stance: Time  Speed/Marybeth: Slow;Shuffled  Step Length: Right shortened;Left shortened  Swing Pattern: Right asymmetrical;Left asymmetrical  Pain:  Pain Scale 1: Numeric (0 - 10)  Pain Intensity 1: 0  Activity Tolerance:   Fair   Please refer to the flowsheet for vital signs taken during this treatment.   After treatment:   [] Patient left in no apparent distress sitting up in chair  [x] Patient left in no apparent distress in bed  [x] Call bell left within reach  [] Nursing notified  [] Caregiver present  [] Bed alarm activated      Luisa Walton, PT   Time Calculation: 35 mins

## 2020-05-04 NOTE — CDMP QUERY
Pt admitted with Hypertension Emergency  Pt noted to have  Change in mental status If clinically significant, please document in progress notes and discharge summary if you are evaluating/treating any of the following: 
 
? Stroke ? Stroke ruled out 
? Other, please specify ? Clinically unable to determine The medical record reflects the following: 
 
  Risk Factors: hypertension emergency Clinical Indicators: RRT for AMS, left sided weakness, Code stroke called. Neuro documents \" There are bilateral substantial intracranial arthrosclerosis. Embolic infarct is likely from bilateral substantial intracranial atherosclerosis in the setting of hypertensive emergency. Cardiogenic embolic source cannot be ruled out. Brain MRI:The pattern would be more consistent with multiple infarctions most likely embolic more so than permanent brain injury from hypertensive encephalopathy. Treatment: Asa, plavix, MRI, Brain, CT head Thank- You Beni Henderson RN [phone]

## 2020-05-04 NOTE — PROGRESS NOTES
Hospitalist Progress Note    Patient: Bhanu Velez MRN: [id number]  CSN: [id number]    YOB: 1950  Age: 81 y.o. Sex: female    DOA: 4/27/2020 LOS:  LOS: 7 days          Chief Complaint: Ac resp failure    Assessment/Plan        80 y/o female with h/o hypertension,  obesity presented to the ED with SOB and MO admitted for hypertensive emergency, elevated troponin, lung nodule and abnormal chest xray suggestive of PNA.     DC home 1-2 days, determine if SUMAYA and Loop recorder need to be done while inpatient      RRT and code stroke called 4/29 for AMS  Tele neuro saw patient  Neurology inpatient saw patient 4/30  MRI brain shows pattern c/w multiple infarctions, likely embolic   High flow 02 in place     Acute metabolic encephalopathy due to multi-infarct and hypercapnia   CT head neg  MRI brain -shows pattern c/w multiple infarctions, likely embolic   CT scan brain shows same pattern as MRI  CTA brain/neck no hemodynamically significant cervical vascular stenosis.   EEG  Appreciate neurology consult  permissive HTN  S/p swallow consult -pureed diet tolerated well      Acute hypercapnic and hypoxic respiratory failure  High flow 02  Ammonia ok  No clear CT chest findings of severity     Hypertensive emergency   PRN treatment as per neurology  Improved over the weekend      Elevated troponin   Likely cardiac strain due to severely elevated blood pressure  Appreciate cardiology consult , echo with nl EF     probable pneumonia  Levaquin IV  Rocephin caused arm redness     Hypokalemia -repletion per protocol     Lung nodule   13 x 12 mm nodular density in the right middle lobe   Will recheck after treating for CAP       Diabetes, type 2-continue SSI and accuchecks Q6H     Disposition :  Patient Active Problem List   Diagnosis Code    Elevated troponin R79.89    Hypertensive emergency I16.1    Hypokalemia E87.6    Lung nodule R91.1    CAP (community acquired pneumonia) H54.6    Metabolic encephalopathy G93.41    Hyperglycemia due to type 2 diabetes mellitus ([facility] [address] E11.65    Acute respiratory failure with hypercapnia (HCC) J96.02       Subjective:    Speaks in 1-3 word sentences. No issues this morning. Review of systems:    Denies pain anywhere  Respiratory: denies SOB  Cardiovascular: denies chest pain  Gastrointestinal: denies nausea, vomiting, diarrhea      Vital signs/Intake and Output:  Visit Vitals  BP (!) 147/91 (BP 1 Location: Right arm, BP Patient Position: At rest)   Pulse (!) 105   Temp 98.1 °F (36.7 °C)   Resp 17   Ht 5' 5\" (1.651 m)   Wt 120.4 kg (265 lb 8 oz)   SpO2 98%   BMI 44.18 kg/m²     Current Shift:  No intake/output data recorded. Last three shifts:  05/02 1901 - 05/04 0700  In: -   Out: 2375 [Urine:2375]    Exam:    General: elderly obese weak sleepy AAF, NAD  CVS:Regular rate and rhythm, no M/R/G, S1/S2 heard, no thrill  Lungs:Clear to auscultation bilaterally, no wheezes, rhonchi, or rales  Abdomen: Soft, Nontender, No distention, Normal Bowel sounds, No hepatomegaly  Extremities: No C/C/E, pulses palpable 2+  Skin:normal texture and turgor, no rashes, no lesions  Neuro:grossly normal , follows basic commands  Psych:appropriate                Labs: Results:       Chemistry Recent Labs     05/04/20 0225 05/03/20 0525 05/02/20  0500   * 106* 100*   * 147* 145   K 3.7 4.0 4.0    109 108   CO2 31 32 32   BUN 33* 31* 26*   CREA 1.12 0.99 0.95   CA 9.4 9.2 9.1   AGAP 9 6 5   BUCR 29* 31* 27*   AP 54 47 49   TP 7.4 6.7 6.6   ALB 3.4 3.0* 3.0*   GLOB 4.0 3.7 3.6   AGRAT 0.9 0.8 0.8      CBC w/Diff Recent Labs     05/04/20 0225 05/03/20 0525 05/02/20  0500   WBC 7.8 7.9 8.2   RBC 5.93* 5.30 5.21   HGB 13.8 12.4 12.2   HCT 47.3* 41.9 41.4    362 342   GRANS 75*  --   --    LYMPH 17*  --   --    EOS 0  --   --       Cardiac Enzymes No results for input(s): CPK, CKND1, ZARIA in the last 72 hours.     No lab exists for component: Osmar Parham Coagulation No results for input(s): PTP, INR, APTT, INREXT, INREXT in the last 72 hours. Lipid Panel Lab Results   Component Value Date/Time    Cholesterol, total 174 04/28/2020 04:49 AM    HDL Cholesterol 41 04/28/2020 04:49 AM    LDL, calculated 106.6 (H) 04/28/2020 04:49 AM    VLDL, calculated 26.4 04/28/2020 04:49 AM    Triglyceride 132 04/28/2020 04:49 AM    CHOL/HDL Ratio 4.2 04/28/2020 04:49 AM      BNP No results for input(s): BNPP in the last 72 hours.    Liver Enzymes Recent Labs     05/04/20  0225   TP 7.4   ALB 3.4   AP 54   SGOT 23      Thyroid Studies Lab Results   Component Value Date/Time    TSH 1.98 04/28/2020 04:49 AM        Procedures/imaging: see electronic medical records for all procedures/Xrays and details which were not copied into this note but were reviewed prior to creation of Aye Lees MD

## 2020-05-04 NOTE — PROGRESS NOTES
Problem: Dysphagia (Adult)  Goal: *Acute Goals and Plan of Care (Insert Text)  Description: Recommendations:  Diet: Soft solids, thin liquid   Meds: Per patient preference  Aspiration Precautions  Oral Care TID    Goals:  Patient will:  1. Tolerate PO trials with 0 s/s overt distress in 4/5 trials  2. Utilize compensatory swallow strategies/maneuvers (decrease bite/sip, size/rate, alt. liq/sol) with min cues in 4/5 trials  3. Perform oral-motor/laryngeal exercises to increase oropharyngeal swallow function with min cues  4. Complete an objective swallow study (i.e., MBSS) to assess swallow integrity, r/o aspiration, and determine of safest LRD, min A       Outcome: Progressing Towards Goal    SPEECH LANGUAGE PATHOLOGY DYSPHAGIA TREATMENT    Patient: Chloe Morales (80 y.o. female)  Date: 5/4/2020  Diagnosis: Hypertensive emergency [I16.1]  Elevated troponin [R79.89]   Hypertensive emergency       Precautions: Aspiration Fall  PLOF: Per H&P     ASSESSMENT:  Followed up with dysphagia intervention. A&Ox2, delayed but appropriate responses to questions. Observed self-feeding thin liquid + straw with serial swallows, pudding and solid trials. Demo prolonged mastication of solids, 100% oral clearance appreciated with thin liquid wash and increased time. 0 overt s/sx of aspiration observed across all consistency trials. Recommend soft solid, thin liquid diet with aspiration precautions, small bites/sips, alternate solid/liquid. D/w Dr. Leah Saavedra and RN, Donte Joe. SLP will continue to follow as indicated. Progression toward goals:  [x]         Improving appropriately and progressing toward goals  []         Improving slowly and progressing toward goals  []         Not making progress toward goals and plan of care will be adjusted     PLAN:  Recommendations and Planned Interventions:  Soft solids, thin liquid   Patient continues to benefit from skilled intervention to address the above impairments.  Continue treatment per established plan of care. Discharge Recommendations: To Be Determined     SUBJECTIVE:   Patient stated I'm hungry. OBJECTIVE:   Cognitive and Communication Status:  Neurologic State: Alert, Confused  Orientation Level: Oriented to person, Oriented to place, Disoriented to situation, Disoriented to time  Cognition: Follows commands, Decreased attention/concentration  Perception: Appears intact  Perseveration: No perseveration noted  Safety/Judgement: Decreased insight into deficits  Dysphagia Treatment:  Oral Assessment:  Oral Assessment  Labial: No impairment  Dentition: Natural  Oral Hygiene: Fair  Lingual: No impairment  Velum: No impairment  Mandible: No impairment  Gag Reflex: Other (comment)(Not tested)  P.O. Trials:   Patient Position: [facility]   Vocal quality prior to P.O.: No impairment   Consistency Presented: Thin liquid, Puree, Solid   How Presented: Self-fed/presented, Straw, Successive swallows       Bolus Acceptance: No impairment   Bolus Formation/Control: Impaired   Type of Impairment: Delayed, Mastication   Propulsion: No impairment   Oral Residue: None   Initiation of Swallow: No impairment   Laryngeal Elevation: Functional   Aspiration Signs/Symptoms: None   Pharyngeal Phase Characteristics: No impairment, issues, or problems    Effective Modifications:  Alternate liquids/solids, Small sips and bites   Cues for Modifications: Minimal         Oral Phase Severity: Mild   Pharyngeal Phase Severity : No impairment     PAIN:  Pain level pre-treatment: 0/10   Pain level post-treatment: 0/10     After treatment:   []              Patient left in no apparent distress sitting up in chair  [x]              Patient left in no apparent distress in bed  [x]              Call bell left within reach  [x]              Nursing notified  []              Family present  []              Caregiver present  []              Bed alarm activated      COMMUNICATION/EDUCATION:   [x] Aspiration precautions; swallow safety; compensatory techniques  []        Patient unable to participate in education; education ongoing with staff  []  Posted safety precautions in patient's room.   [] Oral-motor/laryngeal strengthening exercises      EMIR Sanchez  Time Calculation: 12 mins

## 2020-05-05 LAB
ERYTHROCYTE [DISTWIDTH] IN BLOOD BY AUTOMATED COUNT: 16.4 % (ref 11.6–14.5)
GLUCOSE BLD STRIP.AUTO-MCNC: 88 MG/DL (ref 70–110)
GLUCOSE BLD STRIP.AUTO-MCNC: 91 MG/DL (ref 70–110)
GLUCOSE BLD STRIP.AUTO-MCNC: 94 MG/DL (ref 70–110)
HCT VFR BLD AUTO: 40.9 % (ref 35–45)
HGB BLD-MCNC: 12.4 G/DL (ref 12–16)
MCH RBC QN AUTO: 23.3 PG (ref 24–34)
MCHC RBC AUTO-ENTMCNC: 30.3 G/DL (ref 31–37)
MCV RBC AUTO: 76.9 FL (ref 74–97)
PLATELET # BLD AUTO: 356 K/UL (ref 135–420)
PMV BLD AUTO: 10.7 FL (ref 9.2–11.8)
RBC # BLD AUTO: 5.32 M/UL (ref 4.2–5.3)
WBC # BLD AUTO: 6.7 K/UL (ref 4.6–13.2)

## 2020-05-05 PROCEDURE — 82962 GLUCOSE BLOOD TEST: CPT

## 2020-05-05 PROCEDURE — 97535 SELF CARE MNGMENT TRAINING: CPT

## 2020-05-05 PROCEDURE — 77010033678 HC OXYGEN DAILY

## 2020-05-05 PROCEDURE — 74011250637 HC RX REV CODE- 250/637: Performed by: PSYCHIATRY & NEUROLOGY

## 2020-05-05 PROCEDURE — 74011000250 HC RX REV CODE- 250: Performed by: NURSE PRACTITIONER

## 2020-05-05 PROCEDURE — 74011250636 HC RX REV CODE- 250/636: Performed by: FAMILY MEDICINE

## 2020-05-05 PROCEDURE — 36415 COLL VENOUS BLD VENIPUNCTURE: CPT

## 2020-05-05 PROCEDURE — 74011250637 HC RX REV CODE- 250/637: Performed by: INTERNAL MEDICINE

## 2020-05-05 PROCEDURE — 65270000029 HC RM PRIVATE

## 2020-05-05 PROCEDURE — 74011250636 HC RX REV CODE- 250/636: Performed by: INTERNAL MEDICINE

## 2020-05-05 PROCEDURE — 92526 ORAL FUNCTION THERAPY: CPT

## 2020-05-05 PROCEDURE — 97116 GAIT TRAINING THERAPY: CPT

## 2020-05-05 PROCEDURE — 85027 COMPLETE CBC AUTOMATED: CPT

## 2020-05-05 PROCEDURE — 97530 THERAPEUTIC ACTIVITIES: CPT

## 2020-05-05 RX ADMIN — METOPROLOL TARTRATE 2.5 MG: 5 INJECTION INTRAVENOUS at 17:52

## 2020-05-05 RX ADMIN — METOPROLOL TARTRATE 2.5 MG: 5 INJECTION INTRAVENOUS at 12:36

## 2020-05-05 RX ADMIN — Medication 10 ML: at 14:39

## 2020-05-05 RX ADMIN — HEPARIN SODIUM 5000 UNITS: 5000 INJECTION INTRAVENOUS; SUBCUTANEOUS at 14:39

## 2020-05-05 RX ADMIN — HYDRALAZINE HYDROCHLORIDE 10 MG: 20 INJECTION INTRAMUSCULAR; INTRAVENOUS at 12:25

## 2020-05-05 RX ADMIN — ASPIRIN 81 MG: 81 TABLET, COATED ORAL at 10:35

## 2020-05-05 RX ADMIN — AMLODIPINE BESYLATE 10 MG: 5 TABLET ORAL at 10:35

## 2020-05-05 RX ADMIN — HYDRALAZINE HYDROCHLORIDE 10 MG: 20 INJECTION INTRAMUSCULAR; INTRAVENOUS at 05:59

## 2020-05-05 RX ADMIN — Medication 10 ML: at 06:02

## 2020-05-05 RX ADMIN — CLOPIDOGREL BISULFATE 75 MG: 75 TABLET ORAL at 10:35

## 2020-05-05 RX ADMIN — LOSARTAN POTASSIUM: 50 TABLET, FILM COATED ORAL at 10:35

## 2020-05-05 RX ADMIN — ATORVASTATIN CALCIUM 80 MG: 20 TABLET, FILM COATED ORAL at 10:35

## 2020-05-05 RX ADMIN — HEPARIN SODIUM 5000 UNITS: 5000 INJECTION INTRAVENOUS; SUBCUTANEOUS at 22:32

## 2020-05-05 RX ADMIN — Medication 10 ML: at 22:33

## 2020-05-05 RX ADMIN — METOPROLOL TARTRATE 2.5 MG: 5 INJECTION INTRAVENOUS at 05:59

## 2020-05-05 RX ADMIN — HEPARIN SODIUM 5000 UNITS: 5000 INJECTION INTRAVENOUS; SUBCUTANEOUS at 06:02

## 2020-05-05 NOTE — PROGRESS NOTES
0737  Pt is lying in bed. Pt is quiet, oriented to person and place. Pt has a burn rub to left left buttock with square mepilex intact. 1030   Pt was seen by OT. Pt was assisted  to roll back and HOB elevated and breakfast was set up for pt. Pt took meds crushed and mixed with pudding. 1226  Pt was seen by CNA and OT. Pt requested for bedpan but only had fecal smearing. Pt was kept clean . Pt was assisted out of bed by OT to sit at edge of bed with bedpan. Pt was able to move her bowels, brownish soft formed stools of moderate amt. Pt was kept cleaned. Pt ambulated few  side steps then sat at edge of bed. Pt talking more today. Oriented to person, birthdate and place. Pt was asisited back in bed after a few minutes. 1244   Pt was served lunch. 1300  Ate only about 20% of her food. 1  Pt's son was updated on pt's condition  then and spoke to pt over the phone.

## 2020-05-05 NOTE — PROGRESS NOTES
Problem: Mobility Impaired (Adult and Pediatric)  Goal: *Acute Goals and Plan of Care (Insert Text)  Description: Physical Therapy Goals   Initiated 5/3/2020 and to be accomplished within 5-7 day(s)  1. Patient will move from supine <> sit with Min A in prep for out of bed activity and change of position. 2.  Patient will perform sit<> stand with CGA-S with LRAD in prep for transfers/ambulation. 3.  Patient will transfer from bed <> chair with Min A with LRAD for time up in chair for completion of ADL activity. 4.  Patient will ambulate >50 feet with LRAD/Min A for improved functional mobility/safe discharge. Outcome: Progressing Towards Goal   PHYSICAL THERAPY TREATMENT    Patient: Juan Ramon Morales (80 y.o. female)  Date: 5/5/2020  Diagnosis: Hypertensive emergency [I16.1]  Elevated troponin [R79.89]   Hypertensive emergency       Precautions: Fall  Chart, physical therapy assessment, plan of care and goals were reviewed. ASSESSMENT:  Pt seen supine prior to session. Pt reported no pain. Pt able to ambulate a short distance w/ RW, 2 seated rest breaks needed secondary to decrease activity tolerance. Pt requires constant VCs during gait training for proper gait sequencing. Pt transferred back to supine after session, call bell and tray in reach, nurse notified after session. Progression toward goals:  []      Improving appropriately and progressing toward goals  [x]      Improving slowly and progressing toward goals  []      Not making progress toward goals and plan of care will be adjusted     PLAN:  Patient continues to benefit from skilled intervention to address the above impairments. Continue treatment per established plan of care. Discharge Recommendations:  Rehab  Further Equipment Recommendations for Discharge:  N/A     SUBJECTIVE:   Patient stated I don't know if I can walk.     OBJECTIVE DATA SUMMARY:   Critical Behavior:  Neurologic State: Alert  Orientation Level: Oriented to person, Oriented to place, Disoriented to situation, Disoriented to time  Cognition: Follows commands(with extra time)  Safety/Judgement: Decreased insight into deficits  Functional Mobility Training:  Bed Mobility:  Supine to Sit: Minimum assistance  Sit to Supine: Minimum assistance  Scooting: Contact guard assistance  Transfers:  Sit to Stand: Minimum assistance  Stand to Sit: Minimum assistance  Balance:  Sitting: Intact  Sitting - Static: Good (unsupported)  Sitting - Dynamic: Good (unsupported)  Standing: Impaired; With support  Standing - Static: Fair  Standing - Dynamic : Fair  Ambulation/Gait Training:  Distance (ft): 25 Feet (ft)  Assistive Device: Gait belt;Walker, rolling  Ambulation - Level of Assistance: Minimal assistance  Gait Abnormalities: Decreased step clearance;Shuffling gait; Step to gait  Base of Support: Narrowed  Stance: Time  Speed/Marybeth: Shuffled; Slow  Step Length: Left shortened;Right shortened  Swing Pattern: Left asymmetrical;Right asymmetrical  Pain:  Pain Scale 1: Numeric (0 - 10)  Pain Intensity 1: 0  Activity Tolerance:   Fair  Please refer to the flowsheet for vital signs taken during this treatment.   After treatment:   [] Patient left in no apparent distress sitting up in chair  [x] Patient left in no apparent distress in bed  [x] Call bell left within reach  [x] Nursing notified  [] Caregiver present  [] Bed alarm activated      Rajat Mcdonald PT   Time Calculation: 26 mins

## 2020-05-05 NOTE — PROGRESS NOTES
Problem: Falls - Risk of  Goal: *Absence of Falls  Description: Document Wanda Chávez Fall Risk and appropriate interventions in the flowsheet.   Outcome: Progressing Towards Goal  Note: Fall Risk Interventions:  Mobility Interventions: Bed/chair exit alarm    Mentation Interventions: Door open when patient unattended    Medication Interventions: Evaluate medications/consider consulting pharmacy    Elimination Interventions: Call light in reach

## 2020-05-05 NOTE — ROUTINE PROCESS
Bedside and Verbal shift change report given to Aniya RN (oncoming nurse) by Abhinav Abreu RN (offgoing nurse). Report included the following information SBAR, Kardex, Intake/Output and MAR.

## 2020-05-05 NOTE — ROUTINE PROCESS
Bedside and Verbal shift change report given to SEAN Lund RN (oncoming nurse) by IRMA Turk RN (offgoing nurse). Report included the following information SBAR, Kardex, Intake/Output and MAR.

## 2020-05-05 NOTE — PROGRESS NOTES
Problem: Self Care Deficits Care Plan (Adult)  Goal: *Acute Goals and Plan of Care (Insert Text)  Description: Occupational Therapy Goals  Initiated 5/3/2020 within 7 day(s). 1.  Patient will perform grooming with minimal assistance/contact guard assist standing by sink for 5 minutes. 2.  Patient will perform upper body dressing with supervision/set-up. 3.  Patient will perform lower body dressing with minimal assistance/contact guard assist and use of AEs PRN. 4.  Patient will perform toilet transfers with minimal assistance/contact guard assist.  5.  Patient will perform all aspects of toileting with minimal assistance/contact guard assist.  6.  Patient will participate in upper extremity therapeutic exercise/activities with minimal assistance/contact guard assist for 10 minutes. 7.  Patient will utilize energy conservation techniques during functional activities with verbal, visual and tactile cues. Outcome: Progressing Towards Goal   OCCUPATIONAL THERAPY TREATMENT    Patient: Don Morales (80 y.o. female)  Date: 5/5/2020  Diagnosis: Hypertensive emergency [I16.1]  Elevated troponin [R79.89]   Hypertensive emergency       Precautions: Fall    Chart, occupational therapy assessment, plan of care, and goals were reviewed. ASSESSMENT:  Based on the information below, pt presents with decreased strength, balance, safety, and activity tolerance which are limiting her independence with ADL's and transfers. Pt was received in supine on the bed pan with soiled draw sheets. OT and CNA cleaned up pt and completed maribel care with total A. Pt did supine to sit to right side of the bed with Min A and extra time. Pt was able to scoot to the EOB with CGA. Pt doffed and then donned a clean hospital gown with Min A. Pt did sit to stand from EOB with Min A x 2 attempts with vc's for hand placement/safety. Pt said that she needed to \"poop\" in standing. Pt placed on bedpan in sitting.  Pt had bowel movement and required total A for back maribel hygiene in standing. Pt did sit to stand from EOB and took 3 side steps with RW and Min A for balance and RW placement. Pt assisted back to bed with Min A at end of the session. Call light in reach and all needs met. RN present in pt's room. Pt was more verbal this date and didn't require as much time to process/answer questions. Pt stated \"I hate being like this\" at the end of the session. Progression toward goals:  []          Improving appropriately and progressing toward goals  [x]          Improving slowly and progressing toward goals  []          Not making progress toward goals and plan of care will be adjusted     PLAN:  Patient continues to benefit from skilled intervention to address the above impairments. Continue treatment per established plan of care. Discharge Recommendations: To Be Determined - Rehab vs HH (dependent on pt's progress)  Further Equipment Recommendations for Discharge:  N/A     SUBJECTIVE:   Patient stated I hate being like this.     OBJECTIVE DATA SUMMARY:   Cognitive/Behavioral Status:  Neurologic State: Alert  Orientation Level: Oriented to person, Oriented to place, Disoriented to situation, Disoriented to time  Cognition: Follows commands(with extra time)  Safety/Judgement: Decreased insight into deficits    Functional Mobility and Transfers for ADLs:   Bed Mobility:  Supine to Sit: Minimum assistance  Sit to Supine: Minimum assistance  Scooting: Contact guard assistance   Transfers:  Sit to Stand: Minimum assistance  Stand to Sit: Minimum assistance     Balance:  Sitting: Intact  Sitting - Static: Good (unsupported)  Sitting - Dynamic: Good (unsupported)  Standing: Impaired; With support  Standing - Static: Fair  Standing - Dynamic : Fair    ADL Intervention:   Upper Body Dressing Assistance  Dressing Assistance: [address] [place]: Minimum  assistance    Toileting  Toileting Assistance:  Total assistance(dependent)  Bowel Hygiene: Total assistance (dependent)    Cognitive Retraining  Safety/Judgement: Decreased insight into deficits    Pain:  Pain level pre-treatment: 0/10   Pain level post-treatment: 0/10  Pain Intervention(s): Medication administered by RN (see MAR); Rest, Ice, Repositioning  Response to intervention: Nurse notified, See doc flow sheet    Activity Tolerance:    Fair with seated activities on the EOB. Pt required intermittent rest breaks during ADL tasks. Please refer to the flowsheet for vital signs taken during this treatment. After treatment:   []  Patient left in no apparent distress sitting up in chair  [x]  Patient left in no apparent distress in bed  [x]  Call bell left within reach  [x]  Nursing notified  []  Caregiver present  []  Bed alarm activated    COMMUNICATION/EDUCATION:   [x] Role of Occupational Therapy in the acute care setting  [x] Home safety education was provided and the patient/caregiver indicated understanding. [x] Patient/family have participated as able in working towards goals and plan of care. [x] Patient/family agree to work toward stated goals and plan of care. [] Patient understands intent and goals of therapy, but is neutral about his/her participation. [] Patient is unable to participate in goal setting and plan of care.       Thank you for this referral.  Renae Hsieh OTR/L MOT  Time Calculation: 24 mins

## 2020-05-05 NOTE — PROGRESS NOTES
NEUROLOGY PROGRESS NOTE        Patient: Jeff Monroe Minor        Sex: female          DOA: 4/27/2020  YOB: 1950      Age:  81 y.o.         LOS: 8 days     Identification:  80 YO female presents with hypertensive emergency, hypertensive encephalopathy and acute confusion with left-sided weakness. SUBJECTIVE:   Patient feels much better today. Plavix was added yesterday. Stroke Work-up:  Brain MRI: Result Date: 5/1/2020  Impression: 1. The study is markedly limited by patient motion. If symptoms persist, repeat is recommended when the patient is more able to tolerate or can be sedated. 2. Multiple bilateral hemispheric areas of diffusion abnormality in large part restricted diffusion. The pattern would be more consistent with multiple infarctions most likely embolic more so than permanent brain injury from hypertensive encephalopathy. No hemorrhage or definitive mass effect. Ct Head Wo Cont Result Date: 4/29/2020  IMPRESSION: Limited study secondary to motion. No definite acute intracranial abnormalities. CTA of head and neck:   1. No hemodynamically significant cervical vascular stenosis. Mild disease of the carotid bifurcation but no high-grade stenosis. 2. Evidence of substantial intracranial atherosclerosis. Stenosis is most severe of the superior division left M2 segment and left PCA as described above. 3. No other significant intracranial arterial vascular abnormality  Echocardiogram:   · Left Ventricle: Normal cavity size, wall thickness and systolic function (ejection fraction normal). The estimated ejection fraction is 55 - 60%. There is mild (grade 1) left ventricular diastolic dysfunction E'A ratio= 0.90. · Pulmonary Artery: Pulmonary arteries not well visualized. Pulmonary arterial systolic pressure (PASP) is 31 mmHg. Pulmonary hypertension found to be mild.     Lipid panel:   Lab Results   Component Value Date/Time    Cholesterol, total 221 (H) 05/04/2020 12:44 AM HDL Cholesterol 42 05/04/2020 12:44 AM    LDL, calculated 152.4 (H) 05/04/2020 12:44 AM    VLDL, calculated 26.6 05/04/2020 12:44 AM    Triglyceride 133 05/04/2020 12:44 AM    CHOL/HDL Ratio 5.3 (H) 05/04/2020 12:44 AM     HbA1c:   Lab Results   Component Value Date/Time    Hemoglobin A1c 6.7 (H) 05/04/2020 12:44 AM         REVIEW OF SYSTEMS: Denies chest pain, abdominal pain, nausea or vomiting. No fever or chills. OBJECTIVE:      Visit Vitals  /75   Pulse (!) 102   Temp 98.5 °F (36.9 °C)   Resp 18   Ht 5' 5\" (1.651 m)   Wt 120.4 kg (265 lb 8 oz)   SpO2 98%   BMI 44.18 kg/m²     Physical Exam:  GEN: Alert, NAD  EYES: conjunctiva normal, lids with out lesions  HEENT: MMM. HEART: RRR +S1 +S2  LUNGS: CTA B/L no rales or rhonchi. ABDOMEN: Soft, non-tender. EXTREMITIES: No edema cyanosis  SKIN: no rashes or skin breakdown, no nodules  NEURO: Awake and alert to her name and hospital, 2020, February, [place]  Speech is fluent. No dysarthria. Cranials: Face is symmetric. Smiles symmetrically. EOMI, VFF. Tongue is midline. Motor: 5/5 all 4 extremities. Sensory: Grossly normal in light touch. DTR 1+ all 4 extremities. Coordination: Intact with no dysmetria during FNF.      Current Facility-Administered Medications   Medication Dose Route Frequency    clopidogreL (PLAVIX) tablet 75 mg  75 mg Oral DAILY    atorvastatin (LIPITOR) tablet 80 mg  80 mg Oral DAILY    amLODIPine (NORVASC) tablet 10 mg  10 mg Oral DAILY    losartan/hydroCHLOROthiazide (HYZAAR) 100/25 mg   Oral DAILY    hydrALAZINE (APRESOLINE) 20 mg/mL injection 10 mg  10 mg IntraVENous Q6H    cloNIDine (CATAPRES) 0.1 mg/24 hr patch 1 Patch  1 Patch TransDERmal Q7D    metoprolol (LOPRESSOR) injection 2.5 mg  2.5 mg IntraVENous Q6H    labetaloL (NORMODYNE;TRANDATE) 20 mg/4 mL (5 mg/mL) injection 20 mg  20 mg IntraVENous Q6H PRN    albuterol-ipratropium (DUO-NEB) 2.5 MG-0.5 MG/3 ML  3 mL Nebulization Q4H PRN    insulin lispro (HUMALOG) injection SubCUTAneous Q6H    glucose chewable tablet 16 g  4 Tab Oral PRN    glucagon (GLUCAGEN) injection 1 mg  1 mg IntraMUSCular PRN    dextrose 10% infusion 125-250 mL  125-250 mL IntraVENous PRN    aspirin delayed-release tablet 81 mg  81 mg Oral DAILY    heparin (porcine) injection 5,000 Units  5,000 Units SubCUTAneous Q8H    sodium chloride (NS) flush 5-40 mL  5-40 mL IntraVENous Q8H    sodium chloride (NS) flush 5-40 mL  5-40 mL IntraVENous PRN    ondansetron (ZOFRAN) injection 4 mg  4 mg IntraVENous Q4H PRN       Laboratory  Recent Results (from the past 24 hour(s))   GLUCOSE, POC    Collection Time: 05/04/20 12:10 PM   Result Value Ref Range    Glucose (POC) 98 70 - 110 mg/dL   GLUCOSE, POC    Collection Time: 05/04/20  5:47 PM   Result Value Ref Range    Glucose (POC) 96 70 - 110 mg/dL   GLUCOSE, POC    Collection Time: 05/04/20 11:27 PM   Result Value Ref Range    Glucose (POC) 86 70 - 110 mg/dL   CBC W/O DIFF    Collection Time: 05/05/20  3:50 AM   Result Value Ref Range    WBC 6.7 4.6 - 13.2 K/uL    RBC 5.32 (H) 4.20 - 5.30 M/uL    HGB 12.4 12.0 - 16.0 g/dL    HCT 40.9 35.0 - 45.0 %    MCV 76.9 74.0 - 97.0 FL    MCH 23.3 (L) 24.0 - 34.0 PG    MCHC 30.3 (L) 31.0 - 37.0 g/dL    RDW 16.4 (H) 11.6 - 14.5 %    PLATELET 169 125 - 362 K/uL    MPV 10.7 9.2 - 11.8 FL   GLUCOSE, POC    Collection Time: 05/05/20  5:57 AM   Result Value Ref Range    Glucose (POC) 91 70 - 110 mg/dL       Radiology:  Mri Brain Wo Cont    Result Date: 5/1/2020  Brain MR without contrast: Indication: Decreased level of consciousness. Bilateral weakness. History of recent severe hypertension and hypertensive encephalopathy. Procedure: Sagittal spin echo T1, axial FSE FLAIR and T2 and axial diffusion weighted scanning was performed. An axial T2* scan optimized to detect hemosiderin and calcium was performed. Coronal spin echo T1and FSE T2 scans were also performed. No contrast was administered.  Comparison exam: Head CT 4/29/2020 Findings: This study is markedly limited by patient motion. Diffusion: The diffusion sequence is actually relatively diagnostic. There are several bilateral areas of restricted diffusion in the hemispheres. These are located in the subcortical white matter bilaterally most prominent in the right frontal parietal deep white matter but also in cortex and subcortical white matter. Low level increased signal left cerebral peduncle. There is a subcortical left temporal lobe area of high diffusion signal. There is a left parieto-occipital superficial cortical area of high diffusion signal. The axial gradient echo examination is quite limited by motion. Brain parenchyma: The FLAIR T2 and T1 imaging is very limited by patient motion. Some of the areas of restricted diffusion are likely evident as high signal areas but very limited evaluation. No definite mass effect. Ventricles and sulci: Normal, no significant brain atrophy. Extra axial: No extra axial fluid collection or mass. Brain vasculature: Normal, no arterial vascular abnormality is noted. Craniocervical Junction: Very limited evaluation due to patient motion. Extracranial and skull base:  No gross abnormality but limited evaluation. Impression: 1. The study is markedly limited by patient motion. If symptoms persist, repeat is recommended when the patient is more able to tolerate or can be sedated. 2. Multiple bilateral hemispheric areas of diffusion abnormality in large part restricted diffusion. The pattern would be more consistent with multiple infarctions most likely embolic more so than permanent brain injury from hypertensive encephalopathy. No hemorrhage or definitive mass effect.     Ct Head Wo Cont    Result Date: 4/29/2020  EXAM: CT HEAD WO CONT CLINICAL INDICATION/HISTORY: change in mental status COMPARISON: April 29, 2020 TECHNIQUE: Axial CT imaging of the head was performed without intravenous contrast. Sagittal and coronal reconstructions are provided. One or more dose reduction techniques were used on this CT: automated exposure control, adjustment of the mAs and/or kVp according to patient size, and iterative reconstruction techniques. The specific techniques used on this CT exam have been documented in the patient's electronic medical record. Digital Imaging and Communications in Medicine (DICOM) format image data are available to nonaffiliated external healthcare facilities or entities on a secure, media free, reciprocally searchable basis with patient authorization for at least a 12-month period after this study _______________ FINDINGS: Image quality degraded by moderate to severe motion artifact. BRAIN AND POSTERIOR FOSSA: The sulci, folia, ventricles and basal cisterns are within normal limits for the patient's age. There is no intracranial hemorrhage, mass effect, or midline shift. There are no areas of abnormal parenchymal attenuation. EXTRA-AXIAL SPACES AND MENINGES: There are no abnormal extra-axial fluid collections. CALVARIUM: Intact. SINUSES: Clear. OTHER: None. _______________     IMPRESSION: Limited study secondary to motion. No definite acute intracranial abnormalities. Ct Head Wo Cont    Result Date: 4/29/2020  EXAM: CT head INDICATION: Hypertension. Pain. COMPARISON: April 27, 2020. TECHNIQUE: Axial CT imaging of the head was performed without intravenous contrast. Dose reduction techniques used: automated exposure control, adjustment of the mAs and/or kVp according to patient size, and iterative reconstruction techniques. Digital imaging and communications in Medicine (DICOM) format image data are available to nonaffiliated external healthcare facilities or entities on a secure, media free, reciprocally searchable basis with patient authorization for at least 12 months after this study.  _______________ FINDINGS: BRAIN AND POSTERIOR FOSSA: The sulci, folia, ventricles and basal cisterns are within normal limits for the patient's age. There is no intracranial hemorrhage, mass effect, or midline shift. There are no areas of abnormal parenchymal attenuation. EXTRA-AXIAL SPACES AND MENINGES: There are no abnormal extra-axial fluid collections. CALVARIUM: Intact. SINUSES: Clear. OTHER: None. _______________     IMPRESSION: No acute intracranial abnormalities. Ct Head Wo Cont    Result Date: 4/27/2020  EXAM: CT head INDICATION: Hypertension emergency COMPARISON: January 28, 2014 TECHNIQUE: Axial CT imaging of the head was performed without intravenous contrast. One or more dose reduction techniques were used on this CT: automated exposure control, adjustment of the mAs and/or kVp according to patient size, and iterative reconstruction techniques. The specific techniques used on this CT exam have been documented in the patient's electronic medical record. Digital Imaging and Communications in Medicine (DICOM) format image data are available to nonaffiliated external healthcare facilities or entities on a secure, media free, reciprocally searchable basis with patient authorization for at least a 12-month period after this study. _______________ FINDINGS: BRAIN AND POSTERIOR FOSSA: The sulci, folia, ventricles and basal cisterns are within normal limits for the patient's age. There is no intracranial hemorrhage, mass effect, or midline shift. There are no areas of abnormal parenchymal attenuation. EXTRA-AXIAL SPACES AND MENINGES: There are no abnormal extra-axial fluid collections. CALVARIUM: Intact. SINUSES: Clear. OTHER: None. _______________     IMPRESSION: No acute intracranial abnormalities. Ct Chest Wo Cont    Result Date: 4/27/2020  EXAM: CT chest INDICATION: Shortness of breath COMPARISON: August 4, 2019 TECHNIQUE: Axial CT imaging from the thoracic inlet through the diaphragm without intravenous contrast. Multiplanar reformats were generated.  One or more dose reduction techniques were used on this CT: automated exposure control, adjustment of the mAs and/or kVp according to patient size, and iterative reconstruction techniques. The specific techniques used on this CT exam have been documented in the patient's electronic medical record. Digital Imaging and Communications in Medicine (DICOM) format image data are available to nonaffiliated external healthcare facilities or entities on a secure, media free, reciprocally searchable basis with patient authorization for at least a 12-month period after this study. _______________ FINDINGS: THYROID GLAND: There is an enlarged left thyroid lobe with substernal extension measuring 3.8 x 3.3 cm. LYMPH NODES: No enlarged lymph nodes seen. PLEURA: There are no pleural effusion. HEART: Normal without pericardial effusion. Moderate calcific coronary artery disease present. VASCULATURE/MEDIASTINUM: Mild to moderate calcific atherosclerosis present. There is a small hiatal hernia. LUNGS: There is right lower lobe atelectasis and/or infiltrate. There is a 13 x 12 mm nodular density in the right middle lobe which may represent round atelectasis or pneumonia however only nodule not excluded. AIRWAY: Normal. UPPER ABDOMEN: There is a right renal cyst measuring 2.4 cm long the midpole. Otherwise the visualized solid organs are unremarkable. Post gastric bypass changes present. OTHER: No acute or aggressive osseous abnormalities identified. Elevated right hemidiaphragm. _______________     IMPRESSION: There is right lower lobe atelectasis and/or infiltrate. Elevated right hemidiaphragm. Enlarged left thyroid lobe with possible underlying nodule substernal extension. Advise thyroid ultrasound for further evaluation on a nonemergent basis. There is a 13 x 12 mm nodular density in the right middle lobe which may represent round atelectasis or pneumonia however underlying pulmonary nodule is not excluded.  Follow-up as per Fleischner Society protocol. ======== Fleischner Society Pulmonary Nodule Guidelines (revised 2017): Solid nodule >8 mm: Consider CT, PET CT, or tissue sampling at 3 months. Cta Chest W Or W Wo Cont    Result Date: 4/29/2020  EXAM: CTA CHEST W OR W WO CONT CLINICAL INDICATION/HISTORY: Respiratory Distress COMPARISON: Correlation with CT the chest April 27, 2020 TECHNIQUE: Axial CT imaging from the thoracic inlet through the diaphragm with intravenous contrast. Coronal and sagittal MIP reformats were generated. One or more dose reduction techniques were used on this CT: automated exposure control, adjustment of the mAs and/or kVp according to patient size, and iterative reconstruction techniques. The specific techniques used on this CT exam have been documented in the patient's electronic medical record. Digital Imaging and Communications in Medicine (DICOM) format image data are available to nonaffiliated external healthcare facilities or entities on a secure, media free, reciprocally searchable basis with patient authorization for at least a 12-month period after this study. _______________ FINDINGS: EXAM QUALITY: Adequate for diagnosis. However, moderate artifacts degrade image quality particular the lung bases. Satisfactory enhancement of the pulmonary arterial vasculature. PULMONARY ARTERIES: No evidence of pulmonary embolism. MEDIASTINUM: Normal heart size. No evidence of right heart strain. Aorta is unremarkable. No pericardial effusion. Left thyroid soft tissue mass measures LUNGS: Progressive volume loss and atelectasis noted in the right lung base. Mild hazy perihilar groundglass opacities may related to motion and atelectasis versus mild edema. PLEURA: Normal. AIRWAY: Normal. LYMPH NODES: No enlarged nodes. UPPER ABDOMEN: Small hiatal hernia. OTHER: No acute or aggressive osseous abnormalities identified. Degenerative changes noted in both shoulders. Multilevel thoracic spondylosis. _______________     IMPRESSION: 1.   Limited study due to motion however, no convincing evidence of pulmonary embolism. 2. Progressive right basilar volume loss and atelectasis. 3. Suspect mild interstitial edema. Xr Chest Port    Result Date: 5/1/2020  AP portable chest, 5/1/2020 at 0445 hours: INDICATION: Shortness of breath and congestive heart failure. Hypoinflation with elevation right hemidiaphragm. Top normal heart size. Mild thoracic aortic ectasia. No new infiltrate or definitive acute congestive heart failure. IMPRESSION: No definite interval change. Xr Chest Port    Result Date: 4/29/2020  EXAM: XR CHEST PORT CLINICAL INDICATION/HISTORY: AMS -Additional: None COMPARISON: 4/27/2020 TECHNIQUE: Portable frontal view of the chest _______________ FINDINGS: SUPPORT DEVICES: None. HEART AND MEDIASTINUM: Stable cardiomediastinal silhouette. LUNGS AND PLEURAL SPACES: Elevated right hemidiaphragm. Mildly prominent interstitial markings versus hypoinflation. No dense consolidation, large effusion or pneumothorax. _______________     IMPRESSION: Elevated right hemidiaphragm. Mildly prominent interstitial markings versus hypoinflation. Xr Chest Port    Result Date: 4/27/2020  EXAM:  AP Portable Chest X-ray 1 view INDICATION: Shortness of breath COMPARISON: August 2, 2009 _______________ FINDINGS: There is shallow inspiration. Heart size appears enlarged likely due to AP magnification. There is an elevated right hemidiaphragm. There are increased interstitial lung markings which may be secondary to shallow inspiration or mild pulmonary edema. There is suggestion of right lung base atelectasis. There are no pleural effusions. No acute osseous findings. ________________      IMPRESSION: Increased interstitial lung markings which may be secondary to shallow inspiration or mild pulmonary edema. Right lung base atelectasis.       ASSESSMENT/IMPRESSION:   62-year-old female presents with hypertensive emergency, hypertensive encephalopathy, acute on chronic hypercapnic hypoxic respiratory failure, abnormal troponin elevation, who had sudden worsening of confusion and unilateral weakness. 1. Acute ischemic infarct: There are bilateral substantial intracranial arthrosclerosis. Embolic infarct is likely from bilateral substantial intracranial atherosclerosis in the setting of hypertensive emergency. Cardiogenic embolic source cannot be ruled out. 2. Hypertensive emergency  PLAN/RECOMMENDATIONS:  1. Continue aspirin 81 mg daily and Plavix 75 mg daily. 2. Continue atorvastatin 80 mg daily. 3.   SUMAYA and loop recorder after elective procedures are resumed. 3. Normotensive blood pressure less than 140/90 mmHg        Signed:   Bob Jin MD  5/5/2020  5:17 PM

## 2020-05-05 NOTE — ROUTINE PROCESS
1500: received bedside shift report from Fredick Baumgarten, RN (offgoing nurse) and assumed care of the pt. Updated white board, assessed all current needs, left bed in lowest position with wheels locked, bed alarm on and call light within reach. 1900: Shift summary, shift uneventful, no complaints of chest pain or shortness of breath. 1915: spoke with pt's son who is desperately hoping to speak with a doctor about the CT imaging that has been completed for his mom. He would like to know if his mother had a stroke and what the next steps will be. Have placed a page out to Dr. Joanie Quiroz to see if she might have the time to reach out to the pt's son. Best contact number [phone].  
 
1930: spoke with Dr. Joanie Quiroz she will let Dr. Bandar Diehl know and will allow someone who knows her case better handle the phone call tomorrow AM. I have also made the night shift nurse aware to pass along to day shift tomorrow. Called and let son know that the doctor has gone home for the day and that he can expect a call tomorrow.   
 
Frank Sarmiento RN

## 2020-05-05 NOTE — PROGRESS NOTES
Cardiology Progress Note        Patient: Tristen Morales        Sex: female          DOA: 4/27/2020  YOB: 1950      Age:  81 y.o.        LOS:  LOS: 7 days    Patient seen and examined, chart reviewed. Assessment/Plan     Patient Active Problem List   Diagnosis Code    Elevated troponin R79.89    Hypertensive emergency I16.1    Hypokalemia E87.6    Lung nodule R91.1    CAP (community acquired pneumonia) Y31.0    Metabolic encephalopathy Y93.73    Hyperglycemia due to type 2 diabetes mellitus ([facility] [address] E11.65    Acute respiratory failure with hypercapnia (HCC) J96.02    Cerebral infarction (HCC) I63.9      Acute hypercapnic and hypoxic respiratory failure  Altered mental status  Shortness of breath on exertion   Abnormal troponin: No clear evidence of ACS, due to demand ischemia   Abnormal EKG        Echocardiogram revealed Left Ventricle: Normal cavity size, wall thickness and systolic function (ejection fraction normal). The estimated ejection fraction is 55 - 60%. There is mild (grade 1) left ventricular diastolic dysfunction E'A ratio= 0.90. Pulmonary Artery: Pulmonary arteries not well visualized. Pulmonary arterial systolic pressure (PASP) is 31 mmHg. Pulmonary hypertension found to be mild. MRI reported   1. The study is markedly limited by patient motion. If symptoms persist, repeat is recommended when the patient is more able to tolerate or can be sedated. 2. Multiple bilateral hemispheric areas of diffusion abnormality in large part restricted diffusion. The pattern would be more consistent with multiple infarctions most likely embolic more so than permanent brain injury from hypertensive encephalopathy. No hemorrhage or definitive mass effect.     Plan:    Continue Aspirin 81 mg once a day, plavix and statin. Continue anti hypertensive medications as per hospital medicine   Neurologist advised about SUMAYA and Loop recorder.    Continue management as per hospital medicine               Subjective:    cc:  I am ok       REVIEW OF SYSTEMS:     Gen: No fever, chills, malaise, weight loss/gain. Heent: No headache, rhinorrhea, epistaxis, ear pain, hearing loss, sinus pain, neck pain/stiffness, sore throat. Heart: Positive shortness of breath, No Palpitation, No chest pain, pnd, or orthopnea. Resp: No cough, hemoptysis, wheezing and dyspnea  Musc/skeletal: no bone or joint complains. Neuro: No unilateral weakness, numbness, tingling. No seizures. Objective:      Visit Vitals  /69 (BP 1 Location: Left arm, BP Patient Position: At rest)   Pulse 100   Temp 97.7 °F (36.5 °C)   Resp 18   Ht 5' 5\" (1.651 m)   Wt 120.4 kg (265 lb 8 oz)   SpO2 98%   BMI 44.18 kg/m²     Body mass index is 44.18 kg/m². Physical Exam:  General Appearance: Comfortable, Not in any distress  HEENT: ROSENDO. HEAD: Atraumatic  NECK: No JVD, no thyroidomeglay. CAROTIDS: No bruit  LUNGS: Clear bilaterally. HEART: S1+S2 audible, no murmur, no pericardial rub.     ABD: Non-tender, BS Audible    NEUROLOGICAL: Alert, awake, follows verbal commands  Medication:  Current Facility-Administered Medications   Medication Dose Route Frequency    clopidogreL (PLAVIX) tablet 75 mg  75 mg Oral DAILY    [START ON 5/5/2020] atorvastatin (LIPITOR) tablet 80 mg  80 mg Oral DAILY    amLODIPine (NORVASC) tablet 10 mg  10 mg Oral DAILY    losartan/hydroCHLOROthiazide (HYZAAR) 100/25 mg   Oral DAILY    hydrALAZINE (APRESOLINE) 20 mg/mL injection 10 mg  10 mg IntraVENous Q6H    cloNIDine (CATAPRES) 0.1 mg/24 hr patch 1 Patch  1 Patch TransDERmal Q7D    metoprolol (LOPRESSOR) injection 2.5 mg  2.5 mg IntraVENous Q6H    labetaloL (NORMODYNE;TRANDATE) 20 mg/4 mL (5 mg/mL) injection 20 mg  20 mg IntraVENous Q6H PRN    albuterol-ipratropium (DUO-NEB) 2.5 MG-0.5 MG/3 ML  3 mL Nebulization Q4H PRN    insulin lispro (HUMALOG) injection   SubCUTAneous Q6H    glucose chewable tablet 16 g  4 Tab Oral PRN    glucagon (GLUCAGEN) injection 1 mg  1 mg IntraMUSCular PRN    dextrose 10% infusion 125-250 mL  125-250 mL IntraVENous PRN    aspirin delayed-release tablet 81 mg  81 mg Oral DAILY    heparin (porcine) injection 5,000 Units  5,000 Units SubCUTAneous Q8H    sodium chloride (NS) flush 5-40 mL  5-40 mL IntraVENous Q8H    sodium chloride (NS) flush 5-40 mL  5-40 mL IntraVENous PRN    ondansetron (ZOFRAN) injection 4 mg  4 mg IntraVENous Q4H PRN               Lab/Data Reviewed:       Recent Labs     05/04/20 0225 05/03/20 0525 05/02/20  0500   WBC 7.8 7.9 8.2   HGB 13.8 12.4 12.2   HCT 47.3* 41.9 41.4    362 342     Recent Labs     05/04/20 0225 05/03/20 0525 05/02/20  0500   * 147* 145   K 3.7 4.0 4.0    109 108   CO2 31 32 32   * 106* 100*   BUN 33* 31* 26*   CREA 1.12 0.99 0.95   CA 9.4 9.2 9.1       Signed By: Katey Wellington MD     May 4, 2020

## 2020-05-05 NOTE — PROGRESS NOTES
Problem: Dysphagia (Adult)  Goal: *Acute Goals and Plan of Care (Insert Text)  Description: Recommendations:  Diet: Soft solids, thin liquid   Meds: Per patient preference  Aspiration Precautions  Oral Care TID    Goals:  Patient will:  1. Tolerate PO trials with 0 s/s overt distress in 4/5 trials  2. Utilize compensatory swallow strategies/maneuvers (decrease bite/sip, size/rate, alt. liq/sol) with min cues in 4/5 trials  3. Perform oral-motor/laryngeal exercises to increase oropharyngeal swallow function with min cues  4. Complete an objective swallow study (i.e., MBSS) to assess swallow integrity, r/o aspiration, and determine of safest LRD, min A       Outcome: Progressing Towards Goal    SPEECH LANGUAGE PATHOLOGY DYSPHAGIA TREATMENT    Patient: Tristen Morales (80 y.o. female)  Date: 5/5/2020  Diagnosis: Hypertensive emergency [I16.1]  Elevated troponin [R79.89]   Hypertensive emergency       Precautions: Aspiration Fall  PLOF: Independent     ASSESSMENT:  Followed up with soft solid, thin liquid skilled meal assessment. Patient A&Ox2. Observed self-feeding thin liquid via cup and soft solids; continued mildly labored oral bolus preparation with solids, 100% oral clearance appreciated given increased time. 0 overt s/sx of aspiration. Recommend patient continue soft solid, thin liquid diet with aspiration precautions. Educated pt on aspiration precautions and importance of compensatory swallow techniques to decrease aspiration risk (decrease rate of intake & sip/bite size, upright @HOB for all po intake and ~30 minutes after po); verbalized comprehension but requires reinforcement. SLP will continue to follow as indicated.      Progression toward goals:  [x]         Improving appropriately and progressing toward goals  []         Improving slowly and progressing toward goals  []         Not making progress toward goals and plan of care will be adjusted     PLAN:  Recommendations and Planned Interventions:  Soft solids, thin liquid   Patient continues to benefit from skilled intervention to address the above impairments. Continue treatment per established plan of care. Discharge Recommendations: To Be Determined     SUBJECTIVE:   Patient stated I don't know. OBJECTIVE:   Cognitive and Communication Status:  Neurologic State: Alert, Confused  Orientation Level: Oriented to person, Oriented to place  Cognition: Follows commands  Perception: Appears intact  Perseveration: No perseveration noted  Safety/Judgement: Decreased insight into deficits  Dysphagia Treatment:  Oral Assessment:  Oral Assessment  Labial: No impairment  Dentition: Natural  Oral Hygiene: Fair  Lingual: No impairment  Velum: No impairment  Mandible: No impairment  Gag Reflex: Other (comment)(Not tested)  P.O. Trials:   Patient Position: \[facility] [place] [facility]\"" 60   Vocal quality prior to P.O.: No impairment   Consistency Presented: Thin liquid, Puree, Solid   How Presented: Self-fed/presented, Straw, Successive swallows       Bolus Acceptance: No impairment   Bolus Formation/Control: Impaired   Type of Impairment: Delayed, Mastication   Propulsion: No impairment   Oral Residue: None   Initiation of Swallow: No impairment   Laryngeal Elevation: Functional   Aspiration Signs/Symptoms: None   Pharyngeal Phase Characteristics: No impairment, issues, or problems    Effective Modifications:  Alternate liquids/solids, Small sips and bites   Cues for Modifications: Minimal       Oral Phase Severity: Mild   Pharyngeal Phase Severity : No impairment     PAIN:  Pain level pre-treatment: 0/10   Pain level post-treatment: 0/10     After treatment:   []              Patient left in no apparent distress sitting up in chair  [x]              Patient left in no apparent distress in bed  [x]              Call bell left within reach  []              Nursing notified  []              Family present  []              Caregiver present  []              Bed alarm activated      COMMUNICATION/EDUCATION:   [x] Aspiration precautions; swallow safety; compensatory techniques  []        Patient unable to participate in education; education ongoing with staff  []  Posted safety precautions in patient's room.   [] Oral-motor/laryngeal strengthening exercises      EMIR [place]  Time Calculation: 13 mins

## 2020-05-05 NOTE — ROUTINE PROCESS
Bedside shift change report given to Jacklyn Pack RN (oncoming nurse) by Lindy Amezcua RN (offgoing nurse). Report included the following information SBAR, Kardex, Intake/Output, MAR and Cardiac Rhythm SR. Visit Vitals /58 (BP 1 Location: Left arm, BP Patient Position: Supine) Pulse (!) 105 Temp 98.3 °F (36.8 °C) Resp 18 Ht 5' 5\" (1.651 m) Wt 120.4 kg (265 lb 8 oz) SpO2 97% BMI 44.18 kg/m² Lindy Amezcua RN

## 2020-05-05 NOTE — ROUTINE PROCESS
1532 
Bedside and Verbal shift change report given to Michael Hathaway RN (oncoming nurse) by Crissy Choudhury RN (offgoing nurse). Report included the following information SBAR, Kardex and MAR.

## 2020-05-06 LAB
ERYTHROCYTE [DISTWIDTH] IN BLOOD BY AUTOMATED COUNT: 16.1 % (ref 11.6–14.5)
GLUCOSE BLD STRIP.AUTO-MCNC: 100 MG/DL (ref 70–110)
GLUCOSE BLD STRIP.AUTO-MCNC: 104 MG/DL (ref 70–110)
GLUCOSE BLD STRIP.AUTO-MCNC: 105 MG/DL (ref 70–110)
GLUCOSE BLD STRIP.AUTO-MCNC: 111 MG/DL (ref 70–110)
GLUCOSE BLD STRIP.AUTO-MCNC: 90 MG/DL (ref 70–110)
HCT VFR BLD AUTO: 41.6 % (ref 35–45)
HGB BLD-MCNC: 12.9 G/DL (ref 12–16)
MCH RBC QN AUTO: 23.8 PG (ref 24–34)
MCHC RBC AUTO-ENTMCNC: 31 G/DL (ref 31–37)
MCV RBC AUTO: 76.8 FL (ref 74–97)
PLATELET # BLD AUTO: 336 K/UL (ref 135–420)
PMV BLD AUTO: 10.6 FL (ref 9.2–11.8)
RBC # BLD AUTO: 5.42 M/UL (ref 4.2–5.3)
WBC # BLD AUTO: 6.6 K/UL (ref 4.6–13.2)

## 2020-05-06 PROCEDURE — 94660 CPAP INITIATION&MGMT: CPT

## 2020-05-06 PROCEDURE — 74011250637 HC RX REV CODE- 250/637: Performed by: PSYCHIATRY & NEUROLOGY

## 2020-05-06 PROCEDURE — 65270000029 HC RM PRIVATE

## 2020-05-06 PROCEDURE — 74011250637 HC RX REV CODE- 250/637: Performed by: INTERNAL MEDICINE

## 2020-05-06 PROCEDURE — 85027 COMPLETE CBC AUTOMATED: CPT

## 2020-05-06 PROCEDURE — 74011250636 HC RX REV CODE- 250/636: Performed by: FAMILY MEDICINE

## 2020-05-06 PROCEDURE — 82962 GLUCOSE BLOOD TEST: CPT

## 2020-05-06 PROCEDURE — 92526 ORAL FUNCTION THERAPY: CPT

## 2020-05-06 PROCEDURE — 36415 COLL VENOUS BLD VENIPUNCTURE: CPT

## 2020-05-06 PROCEDURE — 77010033678 HC OXYGEN DAILY

## 2020-05-06 PROCEDURE — 97116 GAIT TRAINING THERAPY: CPT

## 2020-05-06 PROCEDURE — 87635 SARS-COV-2 COVID-19 AMP PRB: CPT

## 2020-05-06 PROCEDURE — 74011000250 HC RX REV CODE- 250: Performed by: NURSE PRACTITIONER

## 2020-05-06 PROCEDURE — 97530 THERAPEUTIC ACTIVITIES: CPT

## 2020-05-06 PROCEDURE — 97535 SELF CARE MNGMENT TRAINING: CPT

## 2020-05-06 PROCEDURE — 74011250636 HC RX REV CODE- 250/636: Performed by: INTERNAL MEDICINE

## 2020-05-06 RX ADMIN — METOPROLOL TARTRATE 2.5 MG: 5 INJECTION INTRAVENOUS at 00:11

## 2020-05-06 RX ADMIN — ASPIRIN 81 MG: 81 TABLET, COATED ORAL at 11:43

## 2020-05-06 RX ADMIN — HEPARIN SODIUM 5000 UNITS: 5000 INJECTION INTRAVENOUS; SUBCUTANEOUS at 17:05

## 2020-05-06 RX ADMIN — Medication 10 ML: at 17:58

## 2020-05-06 RX ADMIN — HEPARIN SODIUM 5000 UNITS: 5000 INJECTION INTRAVENOUS; SUBCUTANEOUS at 23:45

## 2020-05-06 RX ADMIN — ATORVASTATIN CALCIUM 80 MG: 20 TABLET, FILM COATED ORAL at 11:42

## 2020-05-06 RX ADMIN — Medication 10 ML: at 05:54

## 2020-05-06 RX ADMIN — LOSARTAN POTASSIUM: 50 TABLET, FILM COATED ORAL at 11:42

## 2020-05-06 RX ADMIN — CLOPIDOGREL BISULFATE 75 MG: 75 TABLET ORAL at 11:43

## 2020-05-06 RX ADMIN — METOPROLOL TARTRATE 2.5 MG: 5 INJECTION INTRAVENOUS at 23:45

## 2020-05-06 RX ADMIN — HYDRALAZINE HYDROCHLORIDE 10 MG: 20 INJECTION INTRAMUSCULAR; INTRAVENOUS at 23:45

## 2020-05-06 RX ADMIN — METOPROLOL TARTRATE 2.5 MG: 5 INJECTION INTRAVENOUS at 17:52

## 2020-05-06 RX ADMIN — Medication 10 ML: at 14:00

## 2020-05-06 RX ADMIN — AMLODIPINE BESYLATE 10 MG: 5 TABLET ORAL at 11:42

## 2020-05-06 RX ADMIN — HYDRALAZINE HYDROCHLORIDE 10 MG: 20 INJECTION INTRAMUSCULAR; INTRAVENOUS at 01:01

## 2020-05-06 RX ADMIN — HYDRALAZINE HYDROCHLORIDE 10 MG: 20 INJECTION INTRAMUSCULAR; INTRAVENOUS at 17:48

## 2020-05-06 RX ADMIN — HEPARIN SODIUM 5000 UNITS: 5000 INJECTION INTRAVENOUS; SUBCUTANEOUS at 06:40

## 2020-05-06 RX ADMIN — Medication 10 ML: at 23:46

## 2020-05-06 NOTE — PROGRESS NOTES
Cardiology Progress Note        Patient: Jeff Monroe Minor        Sex: female          DOA: 4/27/2020  YOB: 1950      Age:  81 y.o.        LOS:  LOS: 8 days    Patient seen and examined, chart reviewed. Assessment/Plan     Patient Active Problem List   Diagnosis Code    Elevated troponin R79.89    Hypertensive emergency I16.1    Hypokalemia E87.6    Lung nodule R91.1    CAP (community acquired pneumonia) D15.9    Metabolic encephalopathy L53.55    Hyperglycemia due to type 2 diabetes mellitus ([facility] [address] E11.65    Acute respiratory failure with hypercapnia (HCC) J96.02    Cerebral infarction (HCC) I63.9      Acute hypercapnic and hypoxic respiratory failure  Altered mental status  Shortness of breath on exertion   Abnormal troponin: No clear evidence of ACS, due to demand ischemia   Abnormal EKG        Echocardiogram revealed Left Ventricle: Normal cavity size, wall thickness and systolic function (ejection fraction normal). The estimated ejection fraction is 55 - 60%. There is mild (grade 1) left ventricular diastolic dysfunction E'A ratio= 0.90. Pulmonary Artery: Pulmonary arteries not well visualized. Pulmonary arterial systolic pressure (PASP) is 31 mmHg. Pulmonary hypertension found to be mild. MRI reported   1. The study is markedly limited by patient motion. If symptoms persist, repeat is recommended when the patient is more able to tolerate or can be sedated. 2. Multiple bilateral hemispheric areas of diffusion abnormality in large part restricted diffusion. The pattern would be more consistent with multiple infarctions most likely embolic more so than permanent brain injury from hypertensive encephalopathy. No hemorrhage or definitive mass effect.     Plan:    Continue Aspirin 81 mg once a day, plavix and statin. Continue anti hypertensive medications as per hospital medicine   Neurologist advised about SUMAYA and Loop recorder.    Continue management as per hospital medicine               Subjective:    cc:  I am ok       REVIEW OF SYSTEMS:     Gen: No fever, chills, malaise, weight loss/gain. Heent: No headache, rhinorrhea, epistaxis, ear pain, hearing loss, sinus pain, neck pain/stiffness, sore throat. Heart: Positive shortness of breath, No Palpitation, No chest pain, pnd, or orthopnea. Resp: No cough, hemoptysis, wheezing and dyspnea  Musc/skeletal: no bone or joint complains. Neuro: No unilateral weakness, numbness, tingling. No seizures. Objective:      Visit Vitals  /73 (BP 1 Location: Left arm, BP Patient Position: At rest)   Pulse (!) 103   Temp 98.4 °F (36.9 °C)   Resp 18   Ht 5' 5\" (1.651 m)   Wt 120.4 kg (265 lb 8 oz)   SpO2 97%   BMI 44.18 kg/m²     Body mass index is 44.18 kg/m². Physical Exam:  General Appearance: Comfortable, Not in any distress  HEENT: ROSENDO. HEAD: Atraumatic  NECK: No JVD, no thyroidomeglay. CAROTIDS: No bruit  LUNGS: Clear bilaterally. HEART: S1+S2 audible, no murmur, no pericardial rub. ABD: Non-tender, BS Audible    NEUROLOGICAL: Alert, awake, confused at times.   Medication:  Current Facility-Administered Medications   Medication Dose Route Frequency    clopidogreL (PLAVIX) tablet 75 mg  75 mg Oral DAILY    atorvastatin (LIPITOR) tablet 80 mg  80 mg Oral DAILY    amLODIPine (NORVASC) tablet 10 mg  10 mg Oral DAILY    losartan/hydroCHLOROthiazide (HYZAAR) 100/25 mg   Oral DAILY    hydrALAZINE (APRESOLINE) 20 mg/mL injection 10 mg  10 mg IntraVENous Q6H    cloNIDine (CATAPRES) 0.1 mg/24 hr patch 1 Patch  1 Patch TransDERmal Q7D    metoprolol (LOPRESSOR) injection 2.5 mg  2.5 mg IntraVENous Q6H    labetaloL (NORMODYNE;TRANDATE) 20 mg/4 mL (5 mg/mL) injection 20 mg  20 mg IntraVENous Q6H PRN    albuterol-ipratropium (DUO-NEB) 2.5 MG-0.5 MG/3 ML  3 mL Nebulization Q4H PRN    insulin lispro (HUMALOG) injection   SubCUTAneous Q6H    glucose chewable tablet 16 g  4 Tab Oral PRN    glucagon (GLUCAGEN) injection 1 mg  1 mg IntraMUSCular PRN    dextrose 10% infusion 125-250 mL  125-250 mL IntraVENous PRN    aspirin delayed-release tablet 81 mg  81 mg Oral DAILY    heparin (porcine) injection 5,000 Units  5,000 Units SubCUTAneous Q8H    sodium chloride (NS) flush 5-40 mL  5-40 mL IntraVENous Q8H    sodium chloride (NS) flush 5-40 mL  5-40 mL IntraVENous PRN    ondansetron (ZOFRAN) injection 4 mg  4 mg IntraVENous Q4H PRN               Lab/Data Reviewed:       Recent Labs     05/05/20  0350 05/04/20 0225 05/03/20  0525   WBC 6.7 7.8 7.9   HGB 12.4 13.8 12.4   HCT 40.9 47.3* 41.9    379 362     Recent Labs     05/04/20 0225 05/03/20  0525   * 147*   K 3.7 4.0    109   CO2 31 32   * 106*   BUN 33* 31*   CREA 1.12 0.99   CA 9.4 9.2       Signed By: Nicole Panda MD     May 5, 2020

## 2020-05-06 NOTE — PROGRESS NOTES
Pt resting in bed in stable condition, no s/s of acute distress noted/voiced. Plan of care discussed, verbalized understanding. All safety and comfort measures in place. NC 2L. AAO2 - self, place. Lift sided weakness. Pending SUMAYA and Loop recorder. Followup with Neuro  Remote tele in place -SR/ST BBB    Left chest clonidine patch in place  Neruology recommends normotensive blood pressure less than 140/90 mmHg. Hydralazine 10mg Iv and Metoprolol 2.5mg IV Q 6hrs ordered. Will continue to monitor BP/Neuro       Will endorse to AM RN to follow up with MD regarding communication with pt's son. Pt unable to communicate and or understand plan of care, which makes it difficult to update her family. BIPAP in use at bedside. Pt tolerated well.    0601: BP treading low. 110/73. Q6 IV BP meds held. Pending reassessment and follow up dose. No distress noted/voiced. Pt follow commands and answer yes/no questions at baseline. Bedside shift change report given to [address] [place] [address] (oncoming nurse) by Bird Vance (offgoing nurse). Report included the following information SBAR, Kardex, Procedure Summary, MAR, Recent Results and Cardiac Rhythm .

## 2020-05-06 NOTE — PROGRESS NOTES
Problem: Self Care Deficits Care Plan (Adult)  Goal: *Acute Goals and Plan of Care (Insert Text)  Description: Occupational Therapy Goals  Initiated 5/3/2020 within 7 day(s). 1.  Patient will perform grooming with minimal assistance/contact guard assist standing by sink for 5 minutes. 2.  Patient will perform upper body dressing with supervision/set-up. 3.  Patient will perform lower body dressing with minimal assistance/contact guard assist and use of AEs PRN. 4.  Patient will perform toilet transfers with minimal assistance/contact guard assist.  5.  Patient will perform all aspects of toileting with minimal assistance/contact guard assist.  6.  Patient will participate in upper extremity therapeutic exercise/activities with minimal assistance/contact guard assist for 10 minutes. 7.  Patient will utilize energy conservation techniques during functional activities with verbal, visual and tactile cues. Outcome: Progressing Towards Goal   OCCUPATIONAL THERAPY TREATMENT    Patient: Debra Morales (80 y.o. female)  Date: 5/6/2020  Diagnosis: Hypertensive emergency [I16.1]  Elevated troponin [R79.89]   Hypertensive emergency       Precautions: Fall    Chart, occupational therapy assessment, plan of care, and goals were reviewed. ASSESSMENT:  Based on the information below, pt continues to present with decreased strength, balance, safety, cognition, and activity tolerance which are limiting her independence with ADL's and transfers. OT did a co-tx with PT for a 2nd set of skilled hands to ensure pt's safety. Pt received in supine. Pt did supine to sit with SBA to right side with HOB elevated and extra time. Pt did sit to stand from EOB with Min A and cues for hand placement/safety. Pt ambulated to/from door with RW and Min A for balance and safety. Pt returned back to bed and did UB ADL's seated. Pt required extra time to complete tasks with Min A for thoroughness.  Pt required vc's to use LUE to assist with tasks. Pt washed her face with setup using RUE. Pt requested to sit on the EOB at the end of the session with RN present in pt's room to take her meds. Pt's call light in reach and all needs met at the end of the session. Progression toward goals:  [x]          Improving appropriately and progressing toward goals  []          Improving slowly and progressing toward goals  []          Not making progress toward goals and plan of care will be adjusted     PLAN:  Patient continues to benefit from skilled intervention to address the above impairments. Continue treatment per established plan of care. Discharge Recommendations:  Rehab  Further Equipment Recommendations for Discharge:  N/A     SUBJECTIVE:   Patient stated Marty Franc want to clean up.     OBJECTIVE DATA SUMMARY:   Cognitive/Behavioral Status:  Neurologic State: Alert  Orientation Level: Oriented to person, Oriented to place, Disoriented to situation, Disoriented to time  Cognition: Follows commands  Safety/Judgement: Decreased insight into deficits    Functional Mobility and Transfers for ADLs:   Bed Mobility:  Supine to Sit: Stand-by assistance(with extra time and HOB elevated)  Scooting: Stand-by assistance(with extra time)   Transfers:  Sit to Stand: Minimum assistance  Stand to Sit: Minimum assistance     Balance:  Sitting: Intact  Sitting - Static: Good (unsupported)  Sitting - Dynamic: Good (unsupported)  Standing: Intact; With support  Standing - Static: Fair  Standing - Dynamic : Fair    ADL Intervention:   Grooming  Grooming Assistance: Set-up  Washing Face: Set-up    Upper Body Bathing  Bathing Assistance: Minimum assistance(to wash thoroughly)  Position Performed: Seated edge of bed    Upper Body Dressing Assistance  Dressing Assistance: Minimum assistance  Hospital Gown: Minimum  assistance    Cognitive Retraining  Safety/Judgement: Decreased insight into deficits    Pain:  Pain level pre-treatment: 0/10   Pain level post-treatment: 0/10  Pain Intervention(s): Medication administered by RN (see MAR); Rest, Ice, Repositioning  Response to intervention: Nurse notified, See doc flow sheet    Activity Tolerance:    Fair with seated activities. Pt required extra time to complete all tasks. Please refer to the flowsheet for vital signs taken during this treatment. After treatment:   [x]  Patient left in no apparent distress sitting up on EOB  []  Patient left in no apparent distress in bed  [x]  Call bell left within reach  [x]  Nursing notified  []  Caregiver present  []  Bed alarm activated    COMMUNICATION/EDUCATION:   [x] Role of Occupational Therapy in the acute care setting  [x] Home safety education was provided and the patient/caregiver indicated understanding. [x] Patient/family have participated as able in working towards goals and plan of care. [x] Patient/family agree to work toward stated goals and plan of care. [] Patient understands intent and goals of therapy, but is neutral about his/her participation. [] Patient is unable to participate in goal setting and plan of care.       Thank you for this referral.  Jessica Hsieh OTR/L MOT  Time Calculation: 26 mins

## 2020-05-06 NOTE — PROGRESS NOTES
Cardiology Progress Note        Patient: Moni Wilkinson Minor        Sex: female          DOA: 4/27/2020  YOB: 1950      Age:  81 y.o.        LOS:  LOS: 9 days    Patient seen and examined, chart reviewed. Assessment/Plan     Patient Active Problem List   Diagnosis Code    Elevated troponin R79.89    Hypertensive emergency I16.1    Hypokalemia E87.6    Lung nodule R91.1    CAP (community acquired pneumonia) S24.7    Metabolic encephalopathy P62.94    Hyperglycemia due to type 2 diabetes mellitus ([facility] [address] E11.65    Acute respiratory failure with hypercapnia (HCC) J96.02    Cerebral infarction (HCC) I63.9      Acute hypercapnic and hypoxic respiratory failure  Altered mental status  Shortness of breath on exertion   Abnormal troponin: No clear evidence of ACS, due to demand ischemia   Abnormal EKG        Echocardiogram revealed Left Ventricle: Normal cavity size, wall thickness and systolic function (ejection fraction normal). The estimated ejection fraction is 55 - 60%. There is mild (grade 1) left ventricular diastolic dysfunction E'A ratio= 0.90. Pulmonary Artery: Pulmonary arteries not well visualized. Pulmonary arterial systolic pressure (PASP) is 31 mmHg. Pulmonary hypertension found to be mild. MRI reported   1. The study is markedly limited by patient motion. If symptoms persist, repeat is recommended when the patient is more able to tolerate or can be sedated. 2. Multiple bilateral hemispheric areas of diffusion abnormality in large part restricted diffusion. The pattern would be more consistent with multiple infarctions most likely embolic more so than permanent brain injury from hypertensive encephalopathy. No hemorrhage or definitive mass effect.     Plan:    Continue Aspirin 81 mg once a day, plavix and statin. Continue anti hypertensive medications as per hospital medicine   Neurologist advised about SUMAYA and Loop recorder.    Discussed with patient about need for loop recorder placement and SUMAYA. Will discuss with family member about plan. Continue management as per hospital medicine   Plan discussed with Dr.Teule Lopez               Subjective:    cc:  I am ok       REVIEW OF SYSTEMS:     Gen: No fever, chills, malaise, weight loss/gain. Heent: No headache, rhinorrhea, epistaxis, ear pain, hearing loss, sinus pain, neck pain/stiffness, sore throat. Heart: Positive shortness of breath, No Palpitation, No chest pain, pnd, or orthopnea. Resp: No cough, hemoptysis, wheezing and dyspnea  Musc/skeletal: no bone or joint complains. Neuro: No unilateral weakness, numbness, tingling. No seizures. Objective:      Visit Vitals  /80 (BP 1 Location: Right arm, BP Patient Position: At rest)   Pulse (!) 101   Temp 98.3 °F (36.8 °C)   Resp 18   Ht 5' 5\" (1.651 m)   Wt 122 kg (269 lb)   SpO2 100%   BMI 44.76 kg/m²     Body mass index is 44.76 kg/m². Physical Exam:  General Appearance: Comfortable, Not in any distress  HEENT: ROSENDO. HEAD: Atraumatic  NECK: No JVD, no thyroidomeglay. CAROTIDS: No bruit  LUNGS: Clear bilaterally. HEART: S1+S2 audible, no murmur, no pericardial rub. ABD: Non-tender, BS Audible    NEUROLOGICAL: Alert, awake, confused at times.   Medication:  Current Facility-Administered Medications   Medication Dose Route Frequency    clopidogreL (PLAVIX) tablet 75 mg  75 mg Oral DAILY    atorvastatin (LIPITOR) tablet 80 mg  80 mg Oral DAILY    amLODIPine (NORVASC) tablet 10 mg  10 mg Oral DAILY    losartan/hydroCHLOROthiazide (HYZAAR) 100/25 mg   Oral DAILY    hydrALAZINE (APRESOLINE) 20 mg/mL injection 10 mg  10 mg IntraVENous Q6H    cloNIDine (CATAPRES) 0.1 mg/24 hr patch 1 Patch  1 Patch TransDERmal Q7D    metoprolol (LOPRESSOR) injection 2.5 mg  2.5 mg IntraVENous Q6H    labetaloL (NORMODYNE;TRANDATE) 20 mg/4 mL (5 mg/mL) injection 20 mg  20 mg IntraVENous Q6H PRN    albuterol-ipratropium (DUO-NEB) 2.5 MG-0.5 MG/3 ML  3 mL Nebulization Q4H PRN    insulin lispro (HUMALOG) injection   SubCUTAneous Q6H    glucose chewable tablet 16 g  4 Tab Oral PRN    glucagon (GLUCAGEN) injection 1 mg  1 mg IntraMUSCular PRN    dextrose 10% infusion 125-250 mL  125-250 mL IntraVENous PRN    aspirin delayed-release tablet 81 mg  81 mg Oral DAILY    heparin (porcine) injection 5,000 Units  5,000 Units SubCUTAneous Q8H    sodium chloride (NS) flush 5-40 mL  5-40 mL IntraVENous Q8H    sodium chloride (NS) flush 5-40 mL  5-40 mL IntraVENous PRN    ondansetron (ZOFRAN) injection 4 mg  4 mg IntraVENous Q4H PRN               Lab/Data Reviewed:       Recent Labs     05/06/20  0425 05/05/20  0350 05/04/20  0225   WBC 6.6 6.7 7.8   HGB 12.9 12.4 13.8   HCT 41.6 40.9 47.3*    356 379     Recent Labs     05/04/20  0225   *   K 3.7      CO2 31   *   BUN 33*   CREA 1.12   CA 9.4       Signed By: Becki Sutherland MD     May 6, 2020

## 2020-05-06 NOTE — PROGRESS NOTES
Problem: Dysphagia (Adult)  Goal: *Acute Goals and Plan of Care (Insert Text)  Description: Recommendations:  Diet: Soft solids, thin liquid   Meds: Per patient preference  Aspiration Precautions  Oral Care TID    Goals:  Patient will:  1. Tolerate PO trials with 0 s/s overt distress in 4/5 trials  2. Utilize compensatory swallow strategies/maneuvers (decrease bite/sip, size/rate, alt. liq/sol) with min cues in 4/5 trials  3. Perform oral-motor/laryngeal exercises to increase oropharyngeal swallow function with min cues  4. Complete an objective swallow study (i.e., MBSS) to assess swallow integrity, r/o aspiration, and determine of safest LRD, min A       Outcome: Progressing Towards Goal    SPEECH LANGUAGE PATHOLOGY DYSPHAGIA TREATMENT    Patient: Fadumo Morales (80 y.o. female)  Date: 5/6/2020  Diagnosis: Hypertensive emergency [I16.1]  Elevated troponin [R79.89]   Hypertensive emergency       Precautions: Aspiration Fall  PLOF: Per H&P     ASSESSMENT:  Followed up with soft solid, thin liquid skilled meal assessment. Patient A&Ox3. Seated EOB. Pt exhibited mildly delayed mastication of solids with otherwise adequate bolus cohesion, manipulation and A-P transit. Further exhibited adequate swallow timing/reflex and hyolaryngeal excursion. Able to manipulate and clear with 0 clinical s/s aspiration. Recommend patient continue soft solid, thin liquid diet with aspiration precautions, small bites/sips, slow rate. Educated pt on aspiration precautions and importance of compensatory swallow techniques to decrease aspiration risk (decrease rate of intake & sip/bite size, upright @HOB for all po intake and ~30 minutes after po); verbalized comprehension. SLP will continue to follow as indicated.      Progression toward goals:  []         Improving appropriately and progressing toward goals  [x]         Improving slowly and progressing toward goals  []         Not making progress toward goals and plan of care will be adjusted     PLAN:  Recommendations and Planned Interventions:  Soft solids, thin liquid   Patient continues to benefit from skilled intervention to address the above impairments. Continue treatment per established plan of care. Discharge Recommendations:  Skilled Nursing Facility     SUBJECTIVE:   Patient stated I'm tired. OBJECTIVE:   Cognitive and Communication Status:  Neurologic State: Alert  Orientation Level: Oriented to person, Oriented to place, Disoriented to situation, Disoriented to time  Cognition: Follows commands  Perception: Appears intact  Perseveration: No perseveration noted  Safety/Judgement: Decreased insight into deficits  Dysphagia Treatment:  Oral Assessment:  Oral Assessment  Labial: No impairment  Dentition: Natural  Oral Hygiene: Fair  Lingual: No impairment  Velum: No impairment  Mandible: No impairment  Gag Reflex: Other (comment)(Not tested)  P.O. Trials:   Patient Position: HOB 60   Vocal quality prior to P.O.: No impairment   Consistency Presented: Thin liquid, Puree, Solid   How Presented: Self-fed/presented, Straw, Successive swallows       Bolus Acceptance: No impairment   Bolus Formation/Control: Impaired   Type of Impairment: Delayed, Mastication   Propulsion: No impairment   Oral Residue: None   Initiation of Swallow: No impairment   Laryngeal Elevation: Functional   Aspiration Signs/Symptoms: None   Pharyngeal Phase Characteristics: No impairment, issues, or problems    Effective Modifications:  Alternate liquids/solids, Small sips and bites   Cues for Modifications: Minimal         Oral Phase Severity: Mild   Pharyngeal Phase Severity : No impairment    PAIN:  Pain level pre-treatment: 0/10   Pain level post-treatment: 0/10     After treatment:   []              Patient left in no apparent distress sitting up in chair  [x]              Patient left in no apparent distress in bed  [x]              Call bell left within reach  []              Nursing notified  [] Family present  []              Caregiver present  []              Bed alarm activated      COMMUNICATION/EDUCATION:   [x] Aspiration precautions; swallow safety; compensatory techniques  []        Patient unable to participate in education; education ongoing with staff  []  Posted safety precautions in patient's room.   [] Oral-motor/laryngeal strengthening exercises      Janet Babin SLP  Time Calculation: 9 mins

## 2020-05-06 NOTE — PROGRESS NOTES
Hospitalist Progress Note    Patient: Blue Preston MRN: [id number]  CSN: [id number]    YOB: 1950  Age: 81 y.o. Sex: female    DOA: 4/27/2020 LOS:  LOS: 9 days          Chief Complaint: Ac resp failure    Assessment/Plan        82 y/o female with h/o hypertension,  obesity presented to the ED with SOB and MO admitted for hypertensive emergency, elevated troponin, lung nodule and abnormal chest xray suggestive of PNA.     Spoke with Cardiology -Dr. Trino Boyd, who will complete SUMAYA and Loop recorder as impatient -better option because involves less coordination that outpatient tests especially in setting of SNF     Ordered SARS-COV-2 as part of protocol for facility placement -no treatment or isolation protocol required     CM working on placement options      RRT and code stroke called 4/29 for AMS  Tele neuro saw patient  Neurology inpatient saw patient 4/30  MRI brain shows pattern c/w multiple infarctions, likely embolic   High flow 02 in place     Acute metabolic encephalopathy due to multi-infarct and hypercapnia   CT head neg  MRI brain -shows pattern c/w multiple infarctions, likely embolic   CT scan brain shows same pattern as MRI  CTA brain/neck no hemodynamically significant cervical vascular stenosis.   EEG  Appreciate neurology consult  permissive HTN  S/p swallow consult -pureed diet tolerated well      Acute hypercapnic and hypoxic respiratory failure  Resolved   No 02 requirement      Hypertensive emergency   PRN treatment as per neurology  Improved over the weekend      Elevated troponin   Likely cardiac strain due to severely elevated blood pressure  Appreciate cardiology consult , echo with nl EF     probable pneumonia  Levaquin IV -course completed     Hypokalemia -resolved      Lung nodule   13 x 12 mm nodular density in the right middle lobe   Will recheck after treating for CAP     Diabetes, type 2-continue SSI and accuchecks Q6H     Disposition : SNF  Patient Active Problem List   Diagnosis Code    Elevated troponin R79.89    Hypertensive emergency I16.1    Hypokalemia E87.6    Lung nodule R91.1    CAP (community acquired pneumonia) J69.7    Metabolic encephalopathy K15.47    Hyperglycemia due to type 2 diabetes mellitus ([facility] [address] E11.65    [facility] J96.02    Cerebral infarction ([facility] I63.9       Subjective:    Working with PT this morning, increasing strength and mobility. Very pleased with herself. Smiling and engaged. Review of systems:    Denies pain anywhere  Respiratory: denies SOB  Cardiovascular: denies chest pain  Gastrointestinal: denies nausea, vomiting, diarrhea      Vital signs/Intake and Output:  Visit Vitals  /80 (BP 1 Location: Right arm, BP Patient Position: At rest)   Pulse (!) 101   Temp 98.3 °F (36.8 °C)   Resp 18   Ht 5' 5\" (1.651 m)   Wt 122 kg (269 lb)   SpO2 100%   BMI 44.76 kg/m²     Current Shift:  No intake/output data recorded.   Last three shifts:  05/04 1901 - 05/06 0700  In: 170 [P.O.:170]  Out: 1350 [Urine:1350]    Exam:    General: elderly obese AAF, NAD  CVS:Regular rate and rhythm, no M/R/G, S1/S2 heard, no thrill  Lungs:Clear to auscultation bilaterally, no wheezes, rhonchi, or rales  Abdomen: Soft, Nontender, No distention, Normal Bowel sounds, No hepatomegaly  Extremities: No C/C/E, pulses palpable 2+  Skin:normal texture and turgor, no rashes, no lesions  Neuro:grossly normal , follows basic commands  Psych:appropriate                Labs: Results:       Chemistry Recent Labs     05/04/20  0225   *   *   K 3.7      CO2 31   BUN 33*   CREA 1.12   CA 9.4   AGAP 9   BUCR 29*   AP 54   TP 7.4   ALB 3.4   GLOB 4.0   AGRAT 0.9      CBC w/Diff Recent Labs     05/06/20  0425 05/05/20  0350 05/04/20  0225   WBC 6.6 6.7 7.8   RBC 5.42* 5.32* 5.93*   HGB 12.9 12.4 13.8   HCT 41.6 40.9 47.3*    356 379   GRANS  --   --  75*   LYMPH  --   --  17*   EOS  --   --  0 Cardiac Enzymes No results for input(s): CPK, CKND1, ZARIA in the last 72 hours. No lab exists for component: CKRMB, TROIP   Coagulation No results for input(s): PTP, INR, APTT, INREXT, INREXT in the last 72 hours. Lipid Panel Lab Results   Component Value Date/Time    Cholesterol, total 221 (H) 05/04/2020 12:44 AM    HDL Cholesterol 42 05/04/2020 12:44 AM    LDL, calculated 152.4 (H) 05/04/2020 12:44 AM    VLDL, calculated 26.6 05/04/2020 12:44 AM    Triglyceride 133 05/04/2020 12:44 AM    CHOL/HDL Ratio 5.3 (H) 05/04/2020 12:44 AM      BNP No results for input(s): BNPP in the last 72 hours.    Liver Enzymes Recent Labs     05/04/20  0225   TP 7.4   ALB 3.4   AP 54   SGOT 23      Thyroid Studies Lab Results   Component Value Date/Time    TSH 1.98 04/28/2020 04:49 AM        Procedures/imaging: see electronic medical records for all procedures/Xrays and details which were not copied into this note but were reviewed prior to creation of Ailyn Andrade MD

## 2020-05-06 NOTE — PROGRESS NOTES
Hospitalist Progress Note    Patient: Nathan Morgan MRN: [id number]  CSN: [id number]    YOB: 1950  Age: 81 y.o. Sex: female    DOA: 4/27/2020 LOS:  LOS: 9 days          Chief Complaint: Ac resp failure    Assessment/Plan        82 y/o female with h/o hypertension,  obesity presented to the ED with SOB and MO admitted for hypertensive emergency, elevated troponin, lung nodule and abnormal chest xray suggestive of PNA.     Spoke with Cardiology -Dr. Janny Garcia who will discuss with Neurology -Dr. Lobo Muse regarding inpatient vs. outpatient SUMAYA and Loop recorder      RRT and code stroke called 4/29 for AMS  Tele neuro saw patient  Neurology inpatient saw patient 4/30  MRI brain shows pattern c/w multiple infarctions, likely embolic   High flow 02 in place     Acute metabolic encephalopathy due to multi-infarct and hypercapnia   CT head neg  MRI brain -shows pattern c/w multiple infarctions, likely embolic   CT scan brain shows same pattern as MRI  CTA brain/neck no hemodynamically significant cervical vascular stenosis.   EEG  Appreciate neurology consult  permissive HTN  S/p swallow consult -pureed diet tolerated well      Acute hypercapnic and hypoxic respiratory failure  Resolved   No 02 requirement      Hypertensive emergency   PRN treatment as per neurology  Improved over the weekend      Elevated troponin   Likely cardiac strain due to severely elevated blood pressure  Appreciate cardiology consult , echo with nl EF     probable pneumonia  Levaquin IV -course completed     Hypokalemia -resolved      Lung nodule   13 x 12 mm nodular density in the right middle lobe   Will recheck after treating for CAP     Diabetes, type 2-continue SSI and accuchecks Q6H     Disposition :  Patient Active Problem List   Diagnosis Code    Elevated troponin R79.89    Hypertensive emergency I16.1    Hypokalemia E87.6    Lung nodule R91.1    CAP (community acquired pneumonia) B99.1    Metabolic encephalopathy G93.41    Hyperglycemia due to type 2 diabetes mellitus ([facility] E11.65    Acute respiratory failure with hypercapnia (HCC) J96.02    Cerebral infarction ([facility] I63.9       Subjective:    Working with PT this morning. Doing well. Review of systems:    Denies pain anywhere  Respiratory: denies SOB  Cardiovascular: denies chest pain  Gastrointestinal: denies nausea, vomiting, diarrhea      Vital signs/Intake and Output:  Visit Vitals  /80 (BP 1 Location: Right arm, BP Patient Position: At rest)   Pulse (!) 101   Temp 98.3 °F (36.8 °C)   Resp 18   Ht 5' 5\" (1.651 m)   Wt 122 kg (269 lb)   SpO2 100%   BMI 44.76 kg/m²     Current Shift:  No intake/output data recorded. Last three shifts:  05/04 1901 - 05/06 0700  In: 170 [P.O.:170]  Out: 1350 [Urine:1350]    Exam:    General: elderly obese weak sleepy AAF, NAD  CVS:Regular rate and rhythm, no M/R/G, S1/S2 heard, no thrill  Lungs:Clear to auscultation bilaterally, no wheezes, rhonchi, or rales  Abdomen: Soft, Nontender, No distention, Normal Bowel sounds, No hepatomegaly  Extremities: No C/C/E, pulses palpable 2+  Skin:normal texture and turgor, no rashes, no lesions  Neuro:grossly normal , follows basic commands  Psych:appropriate                Labs: Results:       Chemistry Recent Labs     05/04/20 0225   *   *   K 3.7      CO2 31   BUN 33*   CREA 1.12   CA 9.4   AGAP 9   BUCR 29*   AP 54   TP 7.4   ALB 3.4   GLOB 4.0   AGRAT 0.9      CBC w/Diff Recent Labs     05/06/20  0425 05/05/20  0350 05/04/20  0225   WBC 6.6 6.7 7.8   RBC 5.42* 5.32* 5.93*   HGB 12.9 12.4 13.8   HCT 41.6 40.9 47.3*    356 379   GRANS  --   --  75*   LYMPH  --   --  17*   EOS  --   --  0      Cardiac Enzymes No results for input(s): CPK, CKND1, ZARIA in the last 72 hours. No lab exists for component: CKRMB, TROIP   Coagulation No results for input(s): PTP, INR, APTT, INREXT, INREXT in the last 72 hours.     Lipid Panel Lab Results   Component Value Date/Time Cholesterol, total 221 (H) 05/04/2020 12:44 AM    HDL Cholesterol 42 05/04/2020 12:44 AM    LDL, calculated 152.4 (H) 05/04/2020 12:44 AM    VLDL, calculated 26.6 05/04/2020 12:44 AM    Triglyceride 133 05/04/2020 12:44 AM    CHOL/HDL Ratio 5.3 (H) 05/04/2020 12:44 AM      BNP No results for input(s): BNPP in the last 72 hours.    Liver Enzymes Recent Labs     05/04/20  0225   TP 7.4   ALB 3.4   AP 54   SGOT 23      Thyroid Studies Lab Results   Component Value Date/Time    TSH 1.98 04/28/2020 04:49 AM        Procedures/imaging: see electronic medical records for all procedures/Xrays and details which were not copied into this note but were reviewed prior to creation of Jess Sung MD

## 2020-05-06 NOTE — PROGRESS NOTES
NEUROLOGY PROGRESS NOTE        Patient: Danielle Mays Minor        Sex: female          DOA: 4/27/2020  YOB: 1950      Age:  81 y.o.         LOS: 9 days     Identification:  80 YO female presents with hypertensive emergency, hypertensive encephalopathy and acute confusion with left-sided weakness. SUBJECTIVE:   Patient feels back to her normal self. No AE from current medications. Stroke Work-up:  Brain MRI: Result Date: 5/1/2020  Impression: 1. The study is markedly limited by patient motion. If symptoms persist, repeat is recommended when the patient is more able to tolerate or can be sedated. 2. Multiple bilateral hemispheric areas of diffusion abnormality in large part restricted diffusion. The pattern would be more consistent with multiple infarctions most likely embolic more so than permanent brain injury from hypertensive encephalopathy. No hemorrhage or definitive mass effect. Ct Head Wo Cont Result Date: 4/29/2020  IMPRESSION: Limited study secondary to motion. No definite acute intracranial abnormalities. CTA of head and neck:   1. No hemodynamically significant cervical vascular stenosis. Mild disease of the carotid bifurcation but no high-grade stenosis. 2. Evidence of substantial intracranial atherosclerosis. Stenosis is most severe of the superior division left M2 segment and left PCA as described above. 3. No other significant intracranial arterial vascular abnormality  Echocardiogram:   · Left Ventricle: Normal cavity size, wall thickness and systolic function (ejection fraction normal). The estimated ejection fraction is 55 - 60%. There is mild (grade 1) left ventricular diastolic dysfunction E'A ratio= 0.90. · Pulmonary Artery: Pulmonary arteries not well visualized. Pulmonary arterial systolic pressure (PASP) is 31 mmHg. Pulmonary hypertension found to be mild.     Lipid panel:   Lab Results   Component Value Date/Time    Cholesterol, total 221 (H) 05/04/2020 12:44 AM    HDL Cholesterol 42 05/04/2020 12:44 AM    LDL, calculated 152.4 (H) 05/04/2020 12:44 AM    VLDL, calculated 26.6 05/04/2020 12:44 AM    Triglyceride 133 05/04/2020 12:44 AM    CHOL/HDL Ratio 5.3 (H) 05/04/2020 12:44 AM     HbA1c:   Lab Results   Component Value Date/Time    Hemoglobin A1c 6.7 (H) 05/04/2020 12:44 AM         REVIEW OF SYSTEMS: Denies chest pain, abdominal pain, nausea or vomiting. No fever or chills. OBJECTIVE:      Visit Vitals  /54 (BP 1 Location: Right arm, BP Patient Position: At rest)   Pulse 98   Temp 97.8 °F (36.6 °C)   Resp 17   Ht 5' 5\" (1.651 m)   Wt 122 kg (269 lb)   SpO2 97%   BMI 44.76 kg/m²     Physical Exam:  GEN: Alert, NAD  EYES: conjunctiva normal, lids with out lesions  HEENT: MMM. HEART: RRR +S1 +S2  LUNGS: CTA B/L no rales or rhonchi. ABDOMEN: Soft, non-tender. EXTREMITIES: No edema cyanosis  SKIN: no rashes or skin breakdown, no nodules  NEURO: Awake and alert to her name and hospital, 2020, February, [place]  Speech is fluent. No dysarthria. Cranials: Face is symmetric. Smiles symmetrically. EOMI, VFF. Tongue is midline. Motor: 5/5 all 4 extremities. Sensory: Grossly normal in light touch. DTR 1+ all 4 extremities. Coordination: Intact with no dysmetria during FNF.      Current Facility-Administered Medications   Medication Dose Route Frequency    clopidogreL (PLAVIX) tablet 75 mg  75 mg Oral DAILY    atorvastatin (LIPITOR) tablet 80 mg  80 mg Oral DAILY    amLODIPine (NORVASC) tablet 10 mg  10 mg Oral DAILY    losartan/hydroCHLOROthiazide (HYZAAR) 100/25 mg   Oral DAILY    hydrALAZINE (APRESOLINE) 20 mg/mL injection 10 mg  10 mg IntraVENous Q6H    cloNIDine (CATAPRES) 0.1 mg/24 hr patch 1 Patch  1 Patch TransDERmal Q7D    metoprolol (LOPRESSOR) injection 2.5 mg  2.5 mg IntraVENous Q6H    labetaloL (NORMODYNE;TRANDATE) 20 mg/4 mL (5 mg/mL) injection 20 mg  20 mg IntraVENous Q6H PRN    albuterol-ipratropium (DUO-NEB) 2.5 MG-0.5 MG/3 ML  3 mL Nebulization Q4H PRN    insulin lispro (HUMALOG) injection   SubCUTAneous Q6H    glucose chewable tablet 16 g  4 Tab Oral PRN    glucagon (GLUCAGEN) injection 1 mg  1 mg IntraMUSCular PRN    dextrose 10% infusion 125-250 mL  125-250 mL IntraVENous PRN    aspirin delayed-release tablet 81 mg  81 mg Oral DAILY    heparin (porcine) injection 5,000 Units  5,000 Units SubCUTAneous Q8H    sodium chloride (NS) flush 5-40 mL  5-40 mL IntraVENous Q8H    sodium chloride (NS) flush 5-40 mL  5-40 mL IntraVENous PRN    ondansetron (ZOFRAN) injection 4 mg  4 mg IntraVENous Q4H PRN       Laboratory  Recent Results (from the past 24 hour(s))   GLUCOSE, POC    Collection Time: 05/05/20 11:57 AM   Result Value Ref Range    Glucose (POC) 94 70 - 110 mg/dL   GLUCOSE, POC    Collection Time: 05/05/20  5:54 PM   Result Value Ref Range    Glucose (POC) 88 70 - 110 mg/dL   GLUCOSE, POC    Collection Time: 05/06/20 12:06 AM   Result Value Ref Range    Glucose (POC) 104 70 - 110 mg/dL   CBC W/O DIFF    Collection Time: 05/06/20  4:25 AM   Result Value Ref Range    WBC 6.6 4.6 - 13.2 K/uL    RBC 5.42 (H) 4.20 - 5.30 M/uL    HGB 12.9 12.0 - 16.0 g/dL    HCT 41.6 35.0 - 45.0 %    MCV 76.8 74.0 - 97.0 FL    MCH 23.8 (L) 24.0 - 34.0 PG    MCHC 31.0 31.0 - 37.0 g/dL    RDW 16.1 (H) 11.6 - 14.5 %    PLATELET 130 902 - 220 K/uL    MPV 10.6 9.2 - 11.8 FL   GLUCOSE, POC    Collection Time: 05/06/20  6:39 AM   Result Value Ref Range    Glucose (POC) 105 70 - 110 mg/dL       Radiology:  Mri Brain Wo Cont    Result Date: 5/1/2020  Brain MR without contrast: Indication: Decreased level of consciousness. Bilateral weakness. History of recent severe hypertension and hypertensive encephalopathy. Procedure: Sagittal spin echo T1, axial FSE FLAIR and T2 and axial diffusion weighted scanning was performed. An axial T2* scan optimized to detect hemosiderin and calcium was performed. Coronal spin echo T1and FSE T2 scans were also performed.   No contrast was administered. Comparison exam: Head CT 4/29/2020 Findings: This study is markedly limited by patient motion. Diffusion: The diffusion sequence is actually relatively diagnostic. There are several bilateral areas of restricted diffusion in the hemispheres. These are located in the subcortical white matter bilaterally most prominent in the right frontal parietal deep white matter but also in cortex and subcortical white matter. Low level increased signal left cerebral peduncle. There is a subcortical left temporal lobe area of high diffusion signal. There is a left parieto-occipital superficial cortical area of high diffusion signal. The axial gradient echo examination is quite limited by motion. Brain parenchyma: The FLAIR T2 and T1 imaging is very limited by patient motion. Some of the areas of restricted diffusion are likely evident as high signal areas but very limited evaluation. No definite mass effect. Ventricles and sulci: Normal, no significant brain atrophy. Extra axial: No extra axial fluid collection or mass. Brain vasculature: Normal, no arterial vascular abnormality is noted. Craniocervical Junction: Very limited evaluation due to patient motion. Extracranial and skull base:  No gross abnormality but limited evaluation. Impression: 1. The study is markedly limited by patient motion. If symptoms persist, repeat is recommended when the patient is more able to tolerate or can be sedated. 2. Multiple bilateral hemispheric areas of diffusion abnormality in large part restricted diffusion. The pattern would be more consistent with multiple infarctions most likely embolic more so than permanent brain injury from hypertensive encephalopathy. No hemorrhage or definitive mass effect.     Ct Head Wo Cont    Result Date: 4/29/2020  EXAM: CT HEAD WO CONT CLINICAL INDICATION/HISTORY: change in mental status COMPARISON: April 29, 2020 TECHNIQUE: Axial CT imaging of the head was performed without intravenous contrast. Sagittal and coronal reconstructions are provided. One or more dose reduction techniques were used on this CT: automated exposure control, adjustment of the mAs and/or kVp according to patient size, and iterative reconstruction techniques. The specific techniques used on this CT exam have been documented in the patient's electronic medical record. Digital Imaging and Communications in Medicine (DICOM) format image data are available to nonaffiliated external healthcare facilities or entities on a secure, media free, reciprocally searchable basis with patient authorization for at least a 12-month period after this study _______________ FINDINGS: Image quality degraded by moderate to severe motion artifact. BRAIN AND POSTERIOR FOSSA: The sulci, folia, ventricles and basal cisterns are within normal limits for the patient's age. There is no intracranial hemorrhage, mass effect, or midline shift. There are no areas of abnormal parenchymal attenuation. EXTRA-AXIAL SPACES AND MENINGES: There are no abnormal extra-axial fluid collections. CALVARIUM: Intact. SINUSES: Clear. OTHER: None. _______________     IMPRESSION: Limited study secondary to motion. No definite acute intracranial abnormalities. Ct Head Wo Cont    Result Date: 4/29/2020  EXAM: CT head INDICATION: Hypertension. Pain. COMPARISON: April 27, 2020. TECHNIQUE: Axial CT imaging of the head was performed without intravenous contrast. Dose reduction techniques used: automated exposure control, adjustment of the mAs and/or kVp according to patient size, and iterative reconstruction techniques. Digital imaging and communications in Medicine (DICOM) format image data are available to nonaffiliated external healthcare facilities or entities on a secure, media free, reciprocally searchable basis with patient authorization for at least 12 months after this study.  _______________ FINDINGS: BRAIN AND POSTERIOR FOSSA: The sulci, folia, ventricles and basal cisterns are within normal limits for the patient's age. There is no intracranial hemorrhage, mass effect, or midline shift. There are no areas of abnormal parenchymal attenuation. EXTRA-AXIAL SPACES AND MENINGES: There are no abnormal extra-axial fluid collections. CALVARIUM: Intact. SINUSES: Clear. OTHER: None. _______________     IMPRESSION: No acute intracranial abnormalities. Ct Head Wo Cont    Result Date: 4/27/2020  EXAM: CT head INDICATION: Hypertension emergency COMPARISON: January 28, 2014 TECHNIQUE: Axial CT imaging of the head was performed without intravenous contrast. One or more dose reduction techniques were used on this CT: automated exposure control, adjustment of the mAs and/or kVp according to patient size, and iterative reconstruction techniques. The specific techniques used on this CT exam have been documented in the patient's electronic medical record. Digital Imaging and Communications in Medicine (DICOM) format image data are available to nonaffiliated external healthcare facilities or entities on a secure, media free, reciprocally searchable basis with patient authorization for at least a 12-month period after this study. _______________ FINDINGS: BRAIN AND POSTERIOR FOSSA: The sulci, folia, ventricles and basal cisterns are within normal limits for the patient's age. There is no intracranial hemorrhage, mass effect, or midline shift. There are no areas of abnormal parenchymal attenuation. EXTRA-AXIAL SPACES AND MENINGES: There are no abnormal extra-axial fluid collections. CALVARIUM: Intact. SINUSES: Clear. OTHER: None. _______________     IMPRESSION: No acute intracranial abnormalities. Ct Chest Wo Cont    Result Date: 4/27/2020  EXAM: CT chest INDICATION: Shortness of breath COMPARISON: August 4, 2019 TECHNIQUE: Axial CT imaging from the thoracic inlet through the diaphragm without intravenous contrast. Multiplanar reformats were generated.  One or more dose reduction techniques were used on this CT: automated exposure control, adjustment of the mAs and/or kVp according to patient size, and iterative reconstruction techniques. The specific techniques used on this CT exam have been documented in the patient's electronic medical record. Digital Imaging and Communications in Medicine (DICOM) format image data are available to nonaffiliated external healthcare facilities or entities on a secure, media free, reciprocally searchable basis with patient authorization for at least a 12-month period after this study. _______________ FINDINGS: THYROID GLAND: There is an enlarged left thyroid lobe with substernal extension measuring 3.8 x 3.3 cm. LYMPH NODES: No enlarged lymph nodes seen. PLEURA: There are no pleural effusion. HEART: Normal without pericardial effusion. Moderate calcific coronary artery disease present. VASCULATURE/MEDIASTINUM: Mild to moderate calcific atherosclerosis present. There is a small hiatal hernia. LUNGS: There is right lower lobe atelectasis and/or infiltrate. There is a 13 x 12 mm nodular density in the right middle lobe which may represent round atelectasis or pneumonia however only nodule not excluded. AIRWAY: Normal. UPPER ABDOMEN: There is a right renal cyst measuring 2.4 cm long the midpole. Otherwise the visualized solid organs are unremarkable. Post gastric bypass changes present. OTHER: No acute or aggressive osseous abnormalities identified. Elevated right hemidiaphragm. _______________     IMPRESSION: There is right lower lobe atelectasis and/or infiltrate. Elevated right hemidiaphragm. Enlarged left thyroid lobe with possible underlying nodule substernal extension. Advise thyroid ultrasound for further evaluation on a nonemergent basis. There is a 13 x 12 mm nodular density in the right middle lobe which may represent round atelectasis or pneumonia however underlying pulmonary nodule is not excluded.  Follow-up as per Fleischner Society protocol. ======== Fleischner Society Pulmonary Nodule Guidelines (revised 2017): Solid nodule >8 mm: Consider CT, PET CT, or tissue sampling at 3 months. Cta Chest W Or W Wo Cont    Result Date: 4/29/2020  EXAM: CTA CHEST W OR W WO CONT CLINICAL INDICATION/HISTORY: Respiratory Distress COMPARISON: Correlation with CT the chest April 27, 2020 TECHNIQUE: Axial CT imaging from the thoracic inlet through the diaphragm with intravenous contrast. Coronal and sagittal MIP reformats were generated. One or more dose reduction techniques were used on this CT: automated exposure control, adjustment of the mAs and/or kVp according to patient size, and iterative reconstruction techniques. The specific techniques used on this CT exam have been documented in the patient's electronic medical record. Digital Imaging and Communications in Medicine (DICOM) format image data are available to nonaffiliated external healthcare facilities or entities on a secure, media free, reciprocally searchable basis with patient authorization for at least a 12-month period after this study. _______________ FINDINGS: EXAM QUALITY: Adequate for diagnosis. However, moderate artifacts degrade image quality particular the lung bases. Satisfactory enhancement of the pulmonary arterial vasculature. PULMONARY ARTERIES: No evidence of pulmonary embolism. MEDIASTINUM: Normal heart size. No evidence of right heart strain. Aorta is unremarkable. No pericardial effusion. Left thyroid soft tissue mass measures LUNGS: Progressive volume loss and atelectasis noted in the right lung base. Mild hazy perihilar groundglass opacities may related to motion and atelectasis versus mild edema. PLEURA: Normal. AIRWAY: Normal. LYMPH NODES: No enlarged nodes. UPPER ABDOMEN: Small hiatal hernia. OTHER: No acute or aggressive osseous abnormalities identified. Degenerative changes noted in both shoulders. Multilevel thoracic spondylosis. _______________     IMPRESSION: 1.   Limited study due to motion however, no convincing evidence of pulmonary embolism. 2. Progressive right basilar volume loss and atelectasis. 3. Suspect mild interstitial edema. Xr Chest Port    Result Date: 5/1/2020  AP portable chest, 5/1/2020 at 0445 hours: INDICATION: Shortness of breath and congestive heart failure. Hypoinflation with elevation right hemidiaphragm. Top normal heart size. Mild thoracic aortic ectasia. No new infiltrate or definitive acute congestive heart failure. IMPRESSION: No definite interval change. Xr Chest Port    Result Date: 4/29/2020  EXAM: XR CHEST PORT CLINICAL INDICATION/HISTORY: AMS -Additional: None COMPARISON: 4/27/2020 TECHNIQUE: Portable frontal view of the chest _______________ FINDINGS: SUPPORT DEVICES: None. HEART AND MEDIASTINUM: Stable cardiomediastinal silhouette. LUNGS AND PLEURAL SPACES: Elevated right hemidiaphragm. Mildly prominent interstitial markings versus hypoinflation. No dense consolidation, large effusion or pneumothorax. _______________     IMPRESSION: Elevated right hemidiaphragm. Mildly prominent interstitial markings versus hypoinflation. Xr Chest Port    Result Date: 4/27/2020  EXAM:  AP Portable Chest X-ray 1 view INDICATION: Shortness of breath COMPARISON: August 2, 2009 _______________ FINDINGS: There is shallow inspiration. Heart size appears enlarged likely due to AP magnification. There is an elevated right hemidiaphragm. There are increased interstitial lung markings which may be secondary to shallow inspiration or mild pulmonary edema. There is suggestion of right lung base atelectasis. There are no pleural effusions. No acute osseous findings. ________________      IMPRESSION: Increased interstitial lung markings which may be secondary to shallow inspiration or mild pulmonary edema. Right lung base atelectasis.       ASSESSMENT/IMPRESSION:   71-year-old female presents with hypertensive emergency, hypertensive encephalopathy, acute on chronic hypercapnic hypoxic respiratory failure, abnormal troponin elevation, who had sudden worsening of confusion and unilateral weakness. 1. Acute ischemic infarct: There are bilateral substantial intracranial arthrosclerosis. Embolic infarct is likely from bilateral substantial intracranial atherosclerosis in the setting of hypertensive emergency. Cardiogenic embolic source cannot be ruled out. 2. Hypertensive emergency  PLAN/RECOMMENDATIONS:  1. Continue aspirin 81 mg daily and Plavix 75 mg daily. 2. Continue atorvastatin 80 mg daily. 3.   SUMAYA and loop recorder after elective procedures are resumed. 3. Normotensive blood pressure less than 140/90 mmHg        Patient can be discharged to rehab from neurologic standpoint if SUMAYA or loop recorder will be postponed until elective procedure resumed. We will follow up with patient after 1 month. Signed:   Amado Jose MD  5/6/2020  5:17 PM No

## 2020-05-06 NOTE — PROGRESS NOTES
4075 Bedside and verbal shift change report given to Shelby Mckeon RN (on coming nurse) by Alek Alicia RN (off going nurse). Report included the following information SBAR, Kardex, OR Summary, Intake/Output and MAR.    1442 Covid-19 testing collected and sent to lab. Shift summary: no new event. 1937 Bedside and verbal shift change report given by Shelby Mckeon RN (off going nurse) to Tato Curry RN(on coming nurse). Report included the following information SBAR, Kardex, OR Summary, Intake/Output and MAR.

## 2020-05-06 NOTE — PROGRESS NOTES
Problem: Mobility Impaired (Adult and Pediatric)  Goal: *Acute Goals and Plan of Care (Insert Text)  Description: Physical Therapy Goals   Initiated 5/3/2020 and to be accomplished within 5-7 day(s)  1. Patient will move from supine <> sit with Min A in prep for out of bed activity and change of position. 2.  Patient will perform sit<> stand with CGA-S with LRAD in prep for transfers/ambulation. 3.  Patient will transfer from bed <> chair with Min A with LRAD for time up in chair for completion of ADL activity. 4.  Patient will ambulate >50 feet with LRAD/Min A for improved functional mobility/safe discharge. Note:   PHYSICAL THERAPY TREATMENT    Patient: Adrianna Morales (80 y.o. female)  Date: 5/6/2020  Diagnosis: Hypertensive emergency [I16.1]  Elevated troponin [R79.89]   Hypertensive emergency  Precautions: Fall   Chart, physical therapy assessment, plan of care and goals were reviewed. ASSESSMENT:  Pt progress with mobility/ Pt required increased time and SB/CGA to move to EOB. Pt able to maintain good static and dynamic sitting balance. Pt required increased time and verbal cues to prompt continued participation however cooperative and smiling during session. Pt amb in room 25 ft c/RW, Stacy for RW management and posture, balance, placement inside RW and cues to look up. Pt fatigued following ambulation however able to tolerated further sitting activities involving reaching for personal hygiene care with OT. Will continue to progress as pt tolerates. RN in room upon exiting. Progression toward goals:  []      Improving appropriately and progressing toward goals  []      Improving slowly and progressing toward goals  []      Not making progress toward goals and plan of care will be adjusted     PLAN:  Patient continues to benefit from skilled intervention to address the above impairments. Continue treatment per established plan of care.   Discharge Recommendations:  Rehab  Further Equipment Recommendations for Discharge:  N/A     SUBJECTIVE:   Patient stated I am ok. Just give me a second.     OBJECTIVE DATA SUMMARY:   Critical Behavior:  Neurologic State: Alert  Orientation Level: Oriented to person, Oriented to place, Disoriented to situation, Disoriented to time  Cognition: Follows commands  Safety/Judgement: Decreased insight into deficits  Functional Mobility Training:  Bed Mobility:   Supine to Sit: Stand-by assistance(with extra time and HOB elevated)   Scooting: Stand-by assistance(with extra time)  Transfers:  Sit to Stand: Minimum assistance  Stand to Sit: Minimum assistance  Balance:  Sitting: Intact  Sitting - Static: Good (unsupported)  Sitting - Dynamic: Good (unsupported)  Standing: Intact; With support  Standing - Static: Fair  Standing - Dynamic : Fair  Ambulation/Gait Training:  Distance (ft): 25 Feet (ft)  Assistive Device: Gait belt;Walker, rolling  Ambulation - Level of Assistance: Minimal assistance  Gait Abnormalities: Decreased step clearance; Step to gait; Shuffling gait; Path deviations  Base of Support: Narrowed  Stance: Time  Speed/Marybeth: Slow;Shuffled  Step Length: Right shortened;Left shortened  Pain:  Pain Scale 1: Numeric (0 - 10)  Pain Intensity 1: 0  Activity Tolerance:   Good  Please refer to the flowsheet for vital signs taken during this treatment.   After treatment:   [] Patient left in no apparent distress sitting up in chair  [x] Patient left in no apparent distress in bed  [x] Call bell left within reach  [x] Nursing present upon exiting  [] Caregiver present  [] Bed alarm activated      Radha [place]   Time Calculation: 27 mins

## 2020-05-06 NOTE — PROGRESS NOTES
05/06/20 0215   CPAP/BIPAP   CPAP/BIPAP Start/Stop On   Device Mode BIPAP;S/T   $$ Bipap Daily Yes   Mask Type and Size Full face; Medium   PIP Observed 12 cm H20   IPAP (cm H2O) 12 cm H2O   EPAP (cm H2O) 7 cm H2O   Inspiratory Time (sec) 1 seconds   Vt Spont (ml) 405 ml   Ve Observed (l/min) 9.1 l/min   Backup Rate 8   Total RR (Spontaneous) 21 breaths per minute   Insp Rise Time (sec) 3   Leak (Estimated) 29 L/min   Pt's Home Machine No   Biomedical Check Performed Yes

## 2020-05-06 NOTE — PROGRESS NOTES
DC Plan: Discharge to [facility] once medically stable. Will need UAI and negative covid test before discharge    Chart reviewed. Pt admitted to medical by hospitalist. PT recommending rehab. [facility] has been offered. Per previous CM notes local referral sent. Pt accepted @ [facility]. Spoke with pt on phone and informed her [facility] could accept once medically stable, she agrees to this dc plan. Also called and spoke with pts anayeli Joiner on phone regarding dc plan, he too verbally agrees to have pt dc to [facility]. Pt will need UAI and negative covid test prior to discharge. CM provided phone number to son. Questions addressed.  CM will cont to follow for transition of care needs [phone]

## 2020-05-07 LAB
GLUCOSE BLD STRIP.AUTO-MCNC: 107 MG/DL (ref 70–110)
GLUCOSE BLD STRIP.AUTO-MCNC: 83 MG/DL (ref 70–110)
GLUCOSE BLD STRIP.AUTO-MCNC: 92 MG/DL (ref 70–110)
GLUCOSE BLD STRIP.AUTO-MCNC: 95 MG/DL (ref 70–110)
GLUCOSE BLD STRIP.AUTO-MCNC: 95 MG/DL (ref 70–110)
SARS-COV-2, COV2NT: NOT DETECTED

## 2020-05-07 PROCEDURE — 74011000250 HC RX REV CODE- 250: Performed by: NURSE PRACTITIONER

## 2020-05-07 PROCEDURE — 65270000029 HC RM PRIVATE

## 2020-05-07 PROCEDURE — 74011250637 HC RX REV CODE- 250/637: Performed by: INTERNAL MEDICINE

## 2020-05-07 PROCEDURE — 82962 GLUCOSE BLOOD TEST: CPT

## 2020-05-07 PROCEDURE — 92526 ORAL FUNCTION THERAPY: CPT

## 2020-05-07 PROCEDURE — 74011250637 HC RX REV CODE- 250/637: Performed by: PSYCHIATRY & NEUROLOGY

## 2020-05-07 PROCEDURE — 77010033678 HC OXYGEN DAILY

## 2020-05-07 PROCEDURE — 74011250636 HC RX REV CODE- 250/636: Performed by: FAMILY MEDICINE

## 2020-05-07 PROCEDURE — 97530 THERAPEUTIC ACTIVITIES: CPT

## 2020-05-07 RX ADMIN — ASPIRIN 81 MG: 81 TABLET, COATED ORAL at 08:42

## 2020-05-07 RX ADMIN — HEPARIN SODIUM 5000 UNITS: 5000 INJECTION INTRAVENOUS; SUBCUTANEOUS at 23:30

## 2020-05-07 RX ADMIN — HEPARIN SODIUM 5000 UNITS: 5000 INJECTION INTRAVENOUS; SUBCUTANEOUS at 06:31

## 2020-05-07 RX ADMIN — METOPROLOL TARTRATE 2.5 MG: 5 INJECTION INTRAVENOUS at 06:31

## 2020-05-07 RX ADMIN — LOSARTAN POTASSIUM: 50 TABLET, FILM COATED ORAL at 08:42

## 2020-05-07 RX ADMIN — HEPARIN SODIUM 5000 UNITS: 5000 INJECTION INTRAVENOUS; SUBCUTANEOUS at 14:57

## 2020-05-07 RX ADMIN — CLOPIDOGREL BISULFATE 75 MG: 75 TABLET ORAL at 08:43

## 2020-05-07 RX ADMIN — Medication 10 ML: at 23:31

## 2020-05-07 RX ADMIN — AMLODIPINE BESYLATE 10 MG: 5 TABLET ORAL at 08:42

## 2020-05-07 RX ADMIN — Medication 10 ML: at 06:32

## 2020-05-07 RX ADMIN — ATORVASTATIN CALCIUM 80 MG: 20 TABLET, FILM COATED ORAL at 08:42

## 2020-05-07 NOTE — PROGRESS NOTES
Problem: Mobility Impaired (Adult and Pediatric)  Goal: *Acute Goals and Plan of Care (Insert Text)  Description: Physical Therapy Goals   Initiated 5/3/2020 and to be accomplished within 5-7 day(s)  1. Patient will move from supine <> sit with Min A in prep for out of bed activity and change of position. 2.  Patient will perform sit<> stand with CGA-S with LRAD in prep for transfers/ambulation. 3.  Patient will transfer from bed <> chair with Min A with LRAD for time up in chair for completion of ADL activity. 4.  Patient will ambulate >50 feet with LRAD/Min A for improved functional mobility/safe discharge. Note:   PHYSICAL THERAPY TREATMENT    Patient: Adrianna Morales (80 y.o. female)  Date: 5/7/2020  Diagnosis: Hypertensive emergency [I16.1]  Elevated troponin [R79.89]   Hypertensive emergency  Precautions: Fall   Chart, physical therapy assessment, plan of care and goals were reviewed. ASSESSMENT:  Pt required encouragement to participate and distraction/redirection to participate in tasks. Pt able to move to EOB with SBA, extra time for processing and cues. Pt participate and standing activities with reaching outside PARTH. Pt resistant to ambulation today. Will continue to progress mobility as pt tolerates. Progression toward goals:  []      Improving appropriately and progressing toward goals  [x]      Improving slowly and progressing toward goals  []      Not making progress toward goals and plan of care will be adjusted     PLAN:  Patient continues to benefit from skilled intervention to address the above impairments. Continue treatment per established plan of care. Discharge Recommendations:  Rehab  Further Equipment Recommendations for Discharge:  N/A     SUBJECTIVE:   Patient stated I am not getting up.     OBJECTIVE DATA SUMMARY:   Critical Behavior:  Neurologic State: Alert  Orientation Level: Oriented to person, Oriented to place  Cognition: Follows commands  Safety/Judgement: Decreased insight into deficits  Functional Mobility Training:  Bed Mobility:   Supine to Sit: Stand-by assistance; Additional time; Adaptive equipment  Transfers:  Sit to Stand: Minimum assistance; Additional time  Stand to Sit: Minimum assistance  Balance:  Sitting: Intact  Standing: Impaired; With support  Standing - Static: Fair  Standing - Dynamic : Fair  Pain:  Pain Scale 1: Numeric (0 - 10)  Pain Intensity 1: 0  Activity Tolerance:   Fair  Please refer to the flowsheet for vital signs taken during this treatment.   After treatment:   [] Patient left in no apparent distress sitting up in chair  [x] Patient left in no apparent distress in bed  [x] Call bell left within reach  [x] Nursing notified  [] Caregiver present  [] Bed alarm activated      Bobbyso Ernie Titcomb   Time Calculation: 20 mins

## 2020-05-07 NOTE — PROGRESS NOTES
NUTRITION FOLLOW-UP    RECOMMENDATIONS/PLAN:   - Other: continue w/ POC  Monitor labs/lytes, PO intakes, skin integrity, wt, fluid status, BM    NUTRITION ASSESSMENT:   Client Update: 81 yrs old Female with SOB and MO, HTN emergency, elevated trop, awaiting  COVID negative test for placement at [address] [place] [address], RRT and code stroke called 4/29 for AMS, neurology saw MRI showed shows pattern c/w multiple infarctions, likely embolic, acute metabolic encephalopathy due to multi-infract and hypercapnia, SLP following, probable pna, lung nodule, DM     FOOD/NUTRITION INTAKE   Diet Order:  soft  Food Allergies: NKFA/  Average PO Intake:      Patient Vitals for the past 100 hrs:   % Diet Eaten   05/05/20 1309 100 %   05/05/20 1031 30 %      Pertinent Medications:  [x] Reviewed; zofran  Electrolyte Replacement Protocol: []K []Mg []PO4  Insulin:  [x]SSI  []Pre-meal   []Basal    []Drip  []None  Cultural/Zoroastrian Food Preferences: None Identified    BIOCHEMICAL DATA & MEDICAL TESTS  Pertinent Labs:  [x] Reviewed; Na-148 GFR est AA-59   ANTHROPOMETRICS  Height: 5' 5\" (165.1 cm)       Weight: 122 kg (269 lb)         BMI: 44.8 kg/m^2 morbidly obese (Greater than or = to 40% BMI)   Adm Weight: 276 lbs                Weight change: -7lbs  Adjusted Body Weight: 74kg     NUTRITION-FOCUSED PHYSICAL ASSESSMENT  Skin: No PU      GI: +BM 5/5/20    NUTRITION PRESCRIPTION  Calories: 2257 kcal/day based on miffilin x 1.3  Protein: 113 g/day based on 2.0 g/kg IBW  Fluid: 2257 ml/day based on 1 kcal/ml        NUTRITION DIAGNOSES:   1. At risk for inadequate oral intake related to chewing difficulties as evidence by modified diet per SLP    NUTRITION INTERVENTIONS:   INTERVENTIONS:        GOALS:  1. - Other: continue w/ POC 1.  Encourage PO intake >50% at most meals by next review 5 days     LEARNING NEEDS (Diet, Supplementation, Food/Nutrient-Drug Interaction):   [] None Identified   [] Education provided/documented      Identified and patient: [] Declined   [] Was not appropriate/indicated        NUTRITION MONITORING /EVALUATION:   Follow PO intake  Monitor wt  Monitor renal labs, electrolytes, fluid status     Previous Recommendations Implemented: yes        Previous Goals Met:  yes -      [] Participated in Interdisciplinary Rounds    [x] Interdisciplinary Care Plan Reviewed  DISCHARGE NUTRITION RECOMMENDATIONS ADDRESSED:     [x] Yes- recommended soft diet     NUTRITION RISK:           [x] At risk                        [] Not currently at risk        Will follow-up per policy.   Rah Blackwell 1

## 2020-05-07 NOTE — PROGRESS NOTES
Hospitalist Progress Note    Patient: Carine Larson MRN: [id number]  CSN: [id number]    YOB: 1950  Age: 81 y.o. Sex: female    DOA: 4/27/2020 LOS:  LOS: 10 days          Chief Complaint:    encephalopathy      Assessment/Plan     82 y/o female with h/o hypertension presented to the ED with SOB and MO admitted for hypertensive emergency, elevated troponin, lung nodule and abnormal chest xray suggestive of PNA.      SARS-COV-2 as part of protocol for facility placement -no treatment or isolation protocol required      SNF planned    SUMAYA and loop recorder as per cardiology     Acute multiple strokes causing encephalopathy     Acute metabolic encephalopathy due to stroke and hypercapnia   CT head neg X 2 prior to MRI  MRI brain -shows pattern c/w multiple infarctions, likely embolic   CTA brain/neck no hemodynamically significant cervical vascular stenosis.     HTN-well controlled-stop IV hydralalzine and lopressor this am     Acute hypercapnic and hypoxic respiratory failure  Resolved   No 02 requirement     bipap at night PRN     Hypertensive emergency   resolved     Elevated troponin   Likely cardiac strain due to severely elevated blood pressure  Appreciate cardiology consult , echo with nl EF     pneumonia  Levaquin IV -course completed  Speech has recommended dental soft diet     Hypokalemia -resolved      Lung nodule   13 x 12 mm nodular density in the right middle lobe   Follow up as outpatient-repeat in 3 months      Diabetes, type 2-continue SSI and accuchecks Q6H    Stable today  SUMAYA prior to d/c anticipated    Then rehab when covid test negative  Disposition :  Patient Active Problem List   Diagnosis Code    Elevated troponin R79.89    Hypertensive emergency I16.1    Hypokalemia E87.6    Lung nodule R91.1    CAP (community acquired pneumonia) F53.6    Metabolic encephalopathy G71.51    Hyperglycemia due to type 2 diabetes mellitus ([facility] [address] E11.65    Acute respiratory failure with hypercapnia (HCC) J96.02    Cerebral infarction (HCC) I63.9       Subjective:    I am good    Denies issues, pain, HA, nausea, weakness    Review of systems:    Constitutional: denies fevers, chills  Respiratory: denies SOB, cough  Cardiovascular: denies chest pain  Gastrointestinal: denies nausea, vomiting, diarrhea      Vital signs/Intake and Output:  Visit Vitals  /52 (BP 1 Location: Left arm, BP Patient Position: At rest)   Pulse 71   Temp 98.7 °F (37.1 °C)   Resp 18   Ht 5' 5\" (1.651 m)   Wt 122 kg (269 lb)   SpO2 99%   BMI 44.76 kg/m²     Current Shift:  No intake/output data recorded. Last three shifts:  05/05 1901 - 05/07 0700  In: -   Out: 800 [Urine:800]    Exam:    General: obese AAF NAD, pleasant  Head/Neck: NCAT, supple, No masses, No lymphadenopathy  CVS:Regular rate and rhythm, no M/R/G, S1/S2 heard, no thrill  Lungs:Clear to auscultation bilaterally, no wheezes, rhonchi, or rales  Abdomen: Soft, Nontender, No distention, Normal Bowel sounds, No hepatomegaly  Extremities: No C/C/E, pulses palpable 2+  Neuro:grossly normal , follows commands  Psych:appropriate                Labs: Results:       Chemistry No results for input(s): GLU, NA, K, CL, CO2, BUN, CREA, CA, AGAP, BUCR, TBIL, GPT, AP, TP, ALB, GLOB, AGRAT in the last 72 hours. CBC w/Diff Recent Labs     05/06/20  0425 05/05/20  0350   WBC 6.6 6.7   RBC 5.42* 5.32*   HGB 12.9 12.4   HCT 41.6 40.9    356      Cardiac Enzymes No results for input(s): CPK, CKND1, ZARIA in the last 72 hours. No lab exists for component: CKRMB, TROIP   Coagulation No results for input(s): PTP, INR, APTT, INREXT in the last 72 hours.     Lipid Panel Lab Results   Component Value Date/Time    Cholesterol, total 221 (H) 05/04/2020 12:44 AM    HDL Cholesterol 42 05/04/2020 12:44 AM    LDL, calculated 152.4 (H) 05/04/2020 12:44 AM    VLDL, calculated 26.6 05/04/2020 12:44 AM    Triglyceride 133 05/04/2020 12:44 AM    CHOL/HDL Ratio 5.3 (H) 05/04/2020 12:44 AM      BNP No results for input(s): BNPP in the last 72 hours. Liver Enzymes No results for input(s): TP, ALB, TBIL, AP, SGOT, GPT in the last 72 hours.     No lab exists for component: DBIL   Thyroid Studies Lab Results   Component Value Date/Time    TSH 1.98 04/28/2020 04:49 AM        Procedures/imaging: see electronic medical records for all procedures/Xrays and details which were not copied into this note but were reviewed prior to creation of Tiara Robert MD

## 2020-05-07 NOTE — PROGRESS NOTES
7720  Assumed care of patient at this time, assessment complete. Patient alert and oriented x2 to self and place. Denies SOB and chest pain. Patient lungs diminished, clear bilaterally. Cap refilled  less than 3 seconds. Patient denies numbness and tingling to all extremities. Stated pain 0/10. Patient has 20G IV to left forearm. SCDs applied to BLE. Call light and personal items in reach, bed in low position and locked, will continue to monitor patient. Fall risk arm band in place. 1011  Speech therapist is working with patient in room at this time. [phone] working with patient in room at this time. [phone] working with patient in room at this time. 1849  Patient had uneventful shift. No signs or symptoms of distress. Patient is on bed rest,  voiding sufficient amounts via external catheter. Plan for patient to d/c to SNF once 2nd covid test come back negative and SUMAYA/loop recorder is completed. 1930  Bedside in verbal shift change report given to Moisés William   (oncoming nurse) by Naila Green RN (off going nurse). Report included the following information: SBAR, KARDEX, MAR and recent results.

## 2020-05-07 NOTE — PROGRESS NOTES
Problem: Dysphagia (Adult)  Goal: *Acute Goals and Plan of Care (Insert Text)  Description: Recommendations:  Diet: Soft solids, thin liquid   Meds: Per patient preference  Aspiration Precautions  Oral Care TID    Goals:  Patient will:  1. Tolerate PO trials with 0 s/s overt distress in 4/5 trials  2. Utilize compensatory swallow strategies/maneuvers (decrease bite/sip, size/rate, alt. liq/sol) with min cues in 4/5 trials  3. Perform oral-motor/laryngeal exercises to increase oropharyngeal swallow function with min cues  4. Complete an objective swallow study (i.e., MBSS) to assess swallow integrity, r/o aspiration, and determine of safest LRD, min A       Outcome: Progressing Towards Goal    SPEECH LANGUAGE PATHOLOGY DYSPHAGIA TREATMENT    Patient: Gordon Morales (80 y.o. female)  Date: 5/7/2020  Diagnosis: Hypertensive emergency [I16.1]  Elevated troponin [R79.89]   Hypertensive emergency       Precautions: Aspiration Fall  PLOF:     ASSESSMENT:  Followed up with soft solid, thin liquid skilled meal assessment. Patient A&Ox3. Seated EOB. Pt exhibited mildly delayed mastication of solids with otherwise adequate bolus cohesion, manipulation and A-P transit. Further exhibited adequate swallow timing/reflex and hyolaryngeal excursion. Able to manipulate and clear with 0 clinical s/s aspiration. Recommend patient continue soft solid, thin liquid diet with aspiration precautions, small bites/sips, slow rate. Educated pt on aspiration precautions and importance of compensatory swallow techniques to decrease aspiration risk (decrease rate of intake & sip/bite size, upright @HOB for all po intake and ~30 minutes after po); verbalized comprehension. SLP will continue to follow as indicated.      Progression toward goals:  []         Improving appropriately and progressing toward goals  [x]         Improving slowly and progressing toward goals  []         Not making progress toward goals and plan of care will be adjusted PLAN:  Recommendations and Planned Interventions:  Soft solids, thin liquid  Patient continues to benefit from skilled intervention to address the above impairments. Continue treatment per established plan of care. Discharge Recommendations:  Skilled Nursing Facility     SUBJECTIVE:   Patient stated I'm done. OBJECTIVE:   Cognitive and Communication Status:  Neurologic State: Alert  Orientation Level: Oriented to person, Oriented to place  Cognition: Follows commands  Perception: Appears intact  Perseveration: No perseveration noted  Safety/Judgement: Decreased insight into deficits  Dysphagia Treatment:  Oral Assessment:  Oral Assessment  Labial: No impairment  Dentition: Natural  Oral Hygiene: Fair  Lingual: No impairment  Velum: No impairment  Mandible: No impairment  Gag Reflex: Other (comment)(Not tested)  P.O. Trials:   Patient Position: HOB 60   Vocal quality prior to P.O.: No impairment   Consistency Presented: Thin liquid, Puree, Solid   How Presented: Self-fed/presented, Straw, Successive swallows       Bolus Acceptance: No impairment   Bolus Formation/Control: Impaired   Type of Impairment: Delayed, Mastication   Propulsion: No impairment   Oral Residue: None   Initiation of Swallow: No impairment   Laryngeal Elevation: Functional   Aspiration Signs/Symptoms: None   Pharyngeal Phase Characteristics: No impairment, issues, or problems    Effective Modifications:  Alternate liquids/solids, Small sips and bites   Cues for Modifications: Minimal       Oral Phase Severity: Mild   Pharyngeal Phase Severity : No impairment     PAIN:  Pain level pre-treatment: 0/10   Pain level post-treatment: 0/10     After treatment:   []              Patient left in no apparent distress sitting up in chair  [x]              Patient left in no apparent distress in bed  [x]              Call bell left within reach  [x]              Nursing notified  []              Family present  []              Caregiver present  [] Bed alarm activated      COMMUNICATION/EDUCATION:   [x] Aspiration precautions; swallow safety; compensatory techniques  []        Patient unable to participate in education; education ongoing with staff  []  Posted safety precautions in patient's room.   [] Oral-motor/laryngeal strengthening exercises      EMIR Gant  Time Calculation: 10 mins

## 2020-05-07 NOTE — PROGRESS NOTES
Problem: Falls - Risk of  Goal: *Absence of Falls  Description: Document Rachel Campuzano Fall Risk and appropriate interventions in the flowsheet. Outcome: Progressing Towards Goal  Note: Fall Risk Interventions:  Mobility Interventions: Communicate number of staff needed for ambulation/transfer    Mentation Interventions: Adequate sleep, hydration, pain control    Medication Interventions: Evaluate medications/consider consulting pharmacy    Elimination Interventions: Call light in reach              Problem: Patient Education: Go to Patient Education Activity  Goal: Patient/Family Education  Outcome: Progressing Towards Goal     Problem: Hypertension  Goal: *Blood pressure within specified parameters  Outcome: Progressing Towards Goal  Goal: *Fluid volume balance  Outcome: Progressing Towards Goal  Goal: *Labs within defined limits  Outcome: Progressing Towards Goal     Problem: Patient Education: Go to Patient Education Activity  Goal: Patient/Family Education  Outcome: Progressing Towards Goal     Problem: General Medical Care Plan  Goal: *Vital signs within specified parameters  Outcome: Progressing Towards Goal  Goal: *Labs within defined limits  Outcome: Progressing Towards Goal  Goal: *Absence of infection signs and symptoms  Outcome: Progressing Towards Goal  Goal: *Optimal pain control at patient's stated goal  Outcome: Progressing Towards Goal  Goal: *Skin integrity maintained  Outcome: Progressing Towards Goal  Goal: *Fluid volume balance  Outcome: Progressing Towards Goal  Goal: *Optimize nutritional status  Outcome: Progressing Towards Goal  Goal: *Anxiety reduced or absent  Outcome: Progressing Towards Goal  Goal: *Progressive mobility and function (eg: ADL's)  Outcome: Progressing Towards Goal     Problem: Diabetes Self-Management  Goal: *Disease process and treatment process  Description: Define diabetes and identify own type of diabetes; list 3 options for treating diabetes.   Outcome: Progressing Towards Goal  Goal: *Incorporating nutritional management into lifestyle  Description: Describe effect of type, amount and timing of food on blood glucose; list 3 methods for planning meals. Outcome: Progressing Towards Goal  Goal: *Incorporating physical activity into lifestyle  Description: State effect of exercise on blood glucose levels. Outcome: Progressing Towards Goal  Goal: *Developing strategies to promote health/change behavior  Description: Define the ABC's of diabetes; identify appropriate screenings, schedule and personal plan for screenings. Outcome: Progressing Towards Goal  Goal: *Using medications safely  Description: State effect of diabetes medications on diabetes; name diabetes medication taking, action and side effects. Outcome: Progressing Towards Goal  Goal: *Monitoring blood glucose, interpreting and using results  Description: Identify recommended blood glucose targets  and personal targets. Outcome: Progressing Towards Goal  Goal: *Prevention, detection, treatment of acute complications  Description: List symptoms of hyper- and hypoglycemia; describe how to treat low blood sugar and actions for lowering  high blood glucose level. Outcome: Progressing Towards Goal  Goal: *Prevention, detection and treatment of chronic complications  Description: Define the natural course of diabetes and describe the relationship of blood glucose levels to long term complications of diabetes.   Outcome: Progressing Towards Goal  Goal: *Developing strategies to address psychosocial issues  Description: Describe feelings about living with diabetes; identify support needed and support network  Outcome: Progressing Towards Goal  Goal: *Insulin pump training  Outcome: Progressing Towards Goal  Goal: *Sick day guidelines  Outcome: Progressing Towards Goal  Goal: *Patient Specific Goal (EDIT GOAL, INSERT TEXT)  Outcome: Progressing Towards Goal     Problem: Patient Education: Go to Patient Education Activity  Goal: Patient/Family Education  Outcome: Progressing Towards Goal     Problem: Pressure Injury - Risk of  Goal: *Prevention of pressure injury  Description: Document Carlos Scale and appropriate interventions in the flowsheet.   Outcome: Progressing Towards Goal  Note: Pressure Injury Interventions:  Sensory Interventions: Assess changes in LOC    Moisture Interventions: Absorbent underpads    Activity Interventions: Pressure redistribution bed/mattress(bed type)    Mobility Interventions: Pressure redistribution bed/mattress (bed type)    Nutrition Interventions: Document food/fluid/supplement intake    Friction and Shear Interventions: Minimize layers                Problem: Patient Education: Go to Patient Education Activity  Goal: Patient/Family Education  Outcome: Progressing Towards Goal     Problem: Pneumonia: Day 1  Goal: Activity/Safety  Outcome: Progressing Towards Goal  Goal: Nutrition/Diet  Outcome: Progressing Towards Goal  Goal: Respiratory  Outcome: Progressing Towards Goal  Goal: Treatments/Interventions/Procedures  Outcome: Progressing Towards Goal  Goal: Psychosocial  Outcome: Progressing Towards Goal  Goal: *Oxygen saturation within defined limits  Outcome: Progressing Towards Goal  Goal: *Influenza vaccine administered (October-March)  Outcome: Progressing Towards Goal  Goal: *Pneumoccocal vaccine administered  Outcome: Progressing Towards Goal  Goal: *Hemodynamically stable  Outcome: Progressing Towards Goal  Goal: *Demonstrates progressive activity  Outcome: Progressing Towards Goal  Goal: *Tolerating diet  Outcome: Progressing Towards Goal     Problem: Patient Education: Go to Patient Education Activity  Goal: Patient/Family Education  Outcome: Progressing Towards Goal     Problem: Patient Education: Go to Patient Education Activity  Goal: Patient/Family Education  Outcome: Progressing Towards Goal     Problem: Patient Education: Go to Patient Education Activity  Goal: Patient/Family Education  Outcome: Progressing Towards Goal

## 2020-05-07 NOTE — PROGRESS NOTES
Attempt for OT session. Pt refusing participation at this time despite providing max verbal encouragement. To be followed tomorrow.      Trinidad Mcleod OTR/L

## 2020-05-07 NOTE — PROGRESS NOTES
DC Plan: Discharge to [facility] once medically stable. Will need UAI and negative covid test before discharge to SNF     Chart reviewed. Pt admitted to medical by hospitalist. PT recommending rehab. [facility] has been offered. Pt accepted @ [facility] pending medical stability and negative covid test.  Spoke with both pt and pts son Yesenia Murillo yesterday regarding dc plan, both agree to dc to [facility]. UAI completed. CM will cont to follow for transition of care needs [id number] with Yesenia Murillo @ [facility], aware covid test not resulted.  Per Yesenia Murillo @ [facility] if SUMAYA and Loop recorder needed would need to before or after SNF in order for them to accept

## 2020-05-08 VITALS
DIASTOLIC BLOOD PRESSURE: 73 MMHG | HEART RATE: 95 BPM | TEMPERATURE: 98.3 F | SYSTOLIC BLOOD PRESSURE: 140 MMHG | HEIGHT: 65 IN | BODY MASS INDEX: 40 KG/M2 | OXYGEN SATURATION: 98 % | WEIGHT: 240.08 LBS | RESPIRATION RATE: 19 BRPM

## 2020-05-08 LAB
GLUCOSE BLD STRIP.AUTO-MCNC: 87 MG/DL (ref 70–110)
GLUCOSE BLD STRIP.AUTO-MCNC: 99 MG/DL (ref 70–110)

## 2020-05-08 PROCEDURE — 74011250637 HC RX REV CODE- 250/637: Performed by: INTERNAL MEDICINE

## 2020-05-08 PROCEDURE — 92526 ORAL FUNCTION THERAPY: CPT

## 2020-05-08 PROCEDURE — 74011250637 HC RX REV CODE- 250/637: Performed by: PSYCHIATRY & NEUROLOGY

## 2020-05-08 PROCEDURE — 74011250636 HC RX REV CODE- 250/636: Performed by: FAMILY MEDICINE

## 2020-05-08 PROCEDURE — 82962 GLUCOSE BLOOD TEST: CPT

## 2020-05-08 PROCEDURE — 77010033678 HC OXYGEN DAILY

## 2020-05-08 RX ORDER — INSULIN LISPRO 100 [IU]/ML
INJECTION, SOLUTION INTRAVENOUS; SUBCUTANEOUS
Status: DISCONTINUED | OUTPATIENT
Start: 2020-05-08 | End: 2020-05-08 | Stop reason: HOSPADM

## 2020-05-08 RX ORDER — ATORVASTATIN CALCIUM 80 MG/1
80 TABLET, FILM COATED ORAL DAILY
Qty: 30 TAB | Refills: 1 | Status: SHIPPED
Start: 2020-05-09

## 2020-05-08 RX ORDER — ASPIRIN 81 MG/1
81 TABLET ORAL DAILY
Qty: 30 TAB | Refills: 0 | Status: SHIPPED
Start: 2020-05-09

## 2020-05-08 RX ORDER — CLONIDINE 0.1 MG/24H
1 PATCH, EXTENDED RELEASE TRANSDERMAL
Qty: 1 PATCH | Refills: 1 | Status: SHIPPED
Start: 2020-05-09

## 2020-05-08 RX ADMIN — ATORVASTATIN CALCIUM 80 MG: 20 TABLET, FILM COATED ORAL at 09:46

## 2020-05-08 RX ADMIN — CLOPIDOGREL BISULFATE 75 MG: 75 TABLET ORAL at 09:45

## 2020-05-08 RX ADMIN — AMLODIPINE BESYLATE 10 MG: 5 TABLET ORAL at 09:46

## 2020-05-08 RX ADMIN — Medication 10 ML: at 06:50

## 2020-05-08 RX ADMIN — ASPIRIN 81 MG: 81 TABLET, COATED ORAL at 09:45

## 2020-05-08 RX ADMIN — LOSARTAN POTASSIUM: 50 TABLET, FILM COATED ORAL at 09:45

## 2020-05-08 RX ADMIN — HEPARIN SODIUM 5000 UNITS: 5000 INJECTION INTRAVENOUS; SUBCUTANEOUS at 06:50

## 2020-05-08 NOTE — PROGRESS NOTES
Problem: Dysphagia (Adult)  Goal: *Acute Goals and Plan of Care (Insert Text)  Description: Recommendations:  Diet: Soft solids, thin liquid   Meds: Per patient preference  Aspiration Precautions  Oral Care TID    Goals:  Patient will:  1. Tolerate PO trials with 0 s/s overt distress in 4/5 trials  2. Utilize compensatory swallow strategies/maneuvers (decrease bite/sip, size/rate, alt. liq/sol) with min cues in 4/5 trials  3. Perform oral-motor/laryngeal exercises to increase oropharyngeal swallow function with min cues  4. Complete an objective swallow study (i.e., MBSS) to assess swallow integrity, r/o aspiration, and determine of safest LRD, min A       Outcome: Progressing Towards Goal    SPEECH LANGUAGE PATHOLOGY DYSPHAGIA TREATMENT    Patient: Douglas Morales (80 y.o. female)  Date: 5/8/2020  Diagnosis: Hypertensive emergency [I16.1]  Elevated troponin [R79.89]   Hypertensive emergency       Precautions: Aspiration Fall  PLOF: Independent     ASSESSMENT:  Followed up with soft solid, thin liquid therapeutic snack. Patient A&Ox3. Pt exhibited mildly delayed mastication of solids with otherwise adequate bolus cohesion, manipulation and A-P transit. Further exhibited adequate swallow timing/reflex and hyolaryngeal excursion. Able to manipulate and clear with 0 clinical s/s aspiration. Recommend patient continue soft solid, thin liquid diet with aspiration precautions, small bites/sips, slow rate. Educated pt on aspiration precautions and importance of compensatory swallow techniques to decrease aspiration risk (decrease rate of intake & sip/bite size, upright @HOB for all po intake and ~30 minutes after po); verbalized comprehension. SLP will continue to follow as indicated.      Progression toward goals:  [x]         Improving appropriately and progressing toward goals  []         Improving slowly and progressing toward goals  []         Not making progress toward goals and plan of care will be adjusted PLAN:  Recommendations and Planned Interventions:  Soft solid, thin liquid   Patient continues to benefit from skilled intervention to address the above impairments. Continue treatment per established plan of care. Discharge Recommendations:  Home Health and [address] [place] [address]:   Patient stated I'm fine. OBJECTIVE:   Cognitive and Communication Status:  Neurologic State: Alert  Orientation Level: Oriented to place, Oriented to person, Oriented to time  Cognition: Follows commands  Perception: Appears intact  Perseveration: No perseveration noted  Safety/Judgement: Decreased insight into deficits  Dysphagia Treatment:  Oral Assessment:  Oral Assessment  Labial: No impairment  Dentition: Natural  Oral Hygiene: Fair  Lingual: No impairment  Velum: No impairment  Mandible: No impairment  Gag Reflex: Other (comment)(Not tested)  P.O. Trials:   Patient Position: [facility] 60   Vocal quality prior to P.O.: No impairment   Consistency Presented: Thin liquid, Puree, Solid   How Presented: Self-fed/presented, Straw, Successive swallows       Bolus Acceptance: No impairment   Bolus Formation/Control: Impaired   Type of Impairment: Delayed, Mastication   Propulsion: No impairment   Oral Residue: None   Initiation of Swallow: No impairment   Laryngeal Elevation: Functional   Aspiration Signs/Symptoms: None   Pharyngeal Phase Characteristics: No impairment, issues, or problems    Effective Modifications:  Alternate liquids/solids, Small sips and bites   Cues for Modifications: Minimal       Oral Phase Severity: Mild   Pharyngeal Phase Severity : No impairment    PAIN:  Pain level pre-treatment: 0/10   Pain level post-treatment: 0/10     After treatment:   []              Patient left in no apparent distress sitting up in chair  [x]              Patient left in no apparent distress in bed  [x]              Call bell left within reach  []              Nursing notified  []              Family present  [] Caregiver present  []              Bed alarm activated      COMMUNICATION/EDUCATION:   [x] Aspiration precautions; swallow safety; compensatory techniques  []        Patient unable to participate in education; education ongoing with staff  []  Posted safety precautions in patient's room.   [] Oral-motor/laryngeal strengthening exercises      Marcellus Alatorre SLP  Time Calculation: 10 mins

## 2020-05-08 NOTE — DISCHARGE SUMMARY
[facility] SUMMARY    Name:  Colby Cullen  MR#:   [id number]  :  1950  ACCOUNT #:  [de-identified]  ADMIT DATE:  2020  DISCHARGE DATE:  2020    DISPOSITION:  The patient is going to skilled nursing rehab. DISCHARGE DIAGNOSES:  1.  Multiple acute strokes. 2.  Metabolic encephalopathy due to above. 3.  Hyperglycemia due to type 2 diabetes mellitus. 4.  Lung nodule. 5.  Hypertensive emergency. 6.  Pneumonia. 7.  Acute hypercapnic respiratory failure, resolved. 8.  Hypertension. HOSPITAL SUMMARY:  The patient is 81years old. She was admitted to the hospital on the aforementioned date. When she came in, she had shortness of breath and orthopnea symptoms, thus she was admitted for hypertensive emergency, possible pneumonia, pulmonary edema and ultimately was started on antibiotics and medication for her uncontrolled blood pressure. She had to be transferred to the ICU because she had worsening respiratory failure with hypercapnia. Cardiology was consulted. She had notable worsening of her mental status required BiPAP support. Neurology was consulted as well. She had CT scan x2 of her head which was negative for acute stroke. The goal was to obtain a brain MRI. She was not able to lie flat initially for that, but ultimately had a brain MRI obtained on , and that showed multiple bilateral hemispheric areas of diffusion abnormality in large part restricted diffusion consistent with multiple infarctions likely embolic. No hemorrhage or definitive mass effect. Her respiratory failure improved as did her mental status slowly but surely and her blood pressure, and she was able to be transitioned back up to the telemetry unit. She has been maintaining normal sinus rhythm. She has a right bundle branch block. Neurology followed and recommended CTA head and neck, SUMAYA when blood pressure is stable.   She may need a loop recorder and continue aspirin and Lipitor. The patient had a CT angiogram head and neck that showed no hemodynamically significant cervical vascular stenosis, evidence of substantial intracranial atherosclerosis. No other significant arterial vascular abnormality. Echocardiogram showed ejection fraction of 47%-22%, grade I diastolic dysfunction. She has been tolerating her oral medications. She has been followed by Speech. She continued to do physical therapy. They are recommending that she continue with the plan for inpatient rehab. She is on a soft solid, thin liquid diet with aspiration precautions. Physical Therapy is noting that she continues to improve but continues to require rehab, and at this point, Cardiology said the SUMAYA could be done as an outpatient. Continue aspirin, Plavix and statin. Loop recorder placed per Cardiology. She was tested for COVID-19 for transition to a facility and that test has come back negative, and thus with that today, the patient is awake and alert, in no acute distress. She is pleasant and conversive. She is an obese [place] American female, resting comfortably in the bed. Temperature 98.7, pulse 100, blood pressure 129/72, respiratory rate 17, SaO2 92% on room air. Lungs are clear bilaterally. Cardiac exam, regular rate and rhythm. Abdomen is obese, soft, nontender. Lower extremities, no pitting edema. No clear neurologic deficits on exam.  Speech is normal.  She is awake and oriented x2, but is a little confused at times. Her last H and H are 12.9 and 41.6. White count and platelet count within normal limits. Her last metabolic panel also unremarkable. Total cholesterol is 221, hemoglobin A1c is 6.7%. Blood sugars are between 87 and 107, on no basal insulin. PLAN:  Discharge to the facility on a dental soft diet, Norvasc 10 mg daily, aspirin 81 mg daily, Lipitor 80 mg daily, Catapres one patch 0.1 mg every week, Plavix 75 mg daily, Hyzaar 100/25 one p.o. daily.   She is not on oxygen. She is not requiring BiPAP regularly, and she can go to the skilled nursing facility today. Hope, she recovers from her acute stroke. Follow up with Dr. Michael Almonte, Cardiology, for SUMAYA after discharge from skilled nursing facility and with Neurology, Dr. Carson Chawla in 1 week as well. Forty minutes on discharge time.       Bryant Aguilar MD      [id number]  D:  05/08/2020 11:06  T:  05/08/2020 11:40  JOB #:  [id number]

## 2020-05-08 NOTE — PROGRESS NOTES
2336 - SARS-COV-2 resulted negative. Shift summary -- Pt had uneventful shift. Denied chest pain and SOB overnight. Did not require BiPAP. Bedside and Verbal shift change report given to Tanvi Jaramillo RN (oncoming nurse) by Mahsa Arndt RN (offgoing nurse). Report included the following information SBAR, Kardex, Intake/Output, MAR, Recent Results and Cardiac Rhythm NSR.

## 2020-05-08 NOTE — PROGRESS NOTES
Cardiology Progress Note        Patient: Shilpa Douglass Minor        Sex: female          DOA: 4/27/2020  YOB: 1950      Age:  81 y.o.        LOS:  LOS: 10 days    Patient seen and examined, chart reviewed. Assessment/Plan     Patient Active Problem List   Diagnosis Code    Elevated troponin R79.89    Hypertensive emergency I16.1    Hypokalemia E87.6    Lung nodule R91.1    CAP (community acquired pneumonia) O55.2    Metabolic encephalopathy P36.31    Hyperglycemia due to type 2 diabetes mellitus ([facility] [address] E11.65    Acute respiratory failure with hypercapnia (HCC) J96.02    Cerebral infarction (HCC) I63.9      Acute hypercapnic and hypoxic respiratory failure  Altered mental status  Shortness of breath on exertion   Abnormal troponin: No clear evidence of ACS, due to demand ischemia   Abnormal EKG        Echocardiogram revealed Left Ventricle: Normal cavity size, wall thickness and systolic function (ejection fraction normal). The estimated ejection fraction is 55 - 60%. There is mild (grade 1) left ventricular diastolic dysfunction E'A ratio= 0.90. Pulmonary Artery: Pulmonary arteries not well visualized. Pulmonary arterial systolic pressure (PASP) is 31 mmHg. Pulmonary hypertension found to be mild. MRI reported   1. The study is markedly limited by patient motion. If symptoms persist, repeat is recommended when the patient is more able to tolerate or can be sedated. 2. Multiple bilateral hemispheric areas of diffusion abnormality in large part restricted diffusion. The pattern would be more consistent with multiple infarctions most likely embolic more so than permanent brain injury from hypertensive encephalopathy. No hemorrhage or definitive mass effect.     Plan:    Continue Aspirin 81 mg once a day, plavix and statin. Continue anti hypertensive medications as per hospital medicine   Neurologist advised about SUMAYA and Loop recorder.    Discussed with patient about need for loop recorder placement and SUMAYA. Will discuss with family member about plan. Continue management as per hospital medicine   COVID 19 is pending. Subjective:    cc:  I am ok       REVIEW OF SYSTEMS:     Gen: No fever, chills, malaise, weight loss/gain. Heent: No headache, rhinorrhea, epistaxis, ear pain, hearing loss, sinus pain, neck pain/stiffness, sore throat. Heart: Positive shortness of breath, No Palpitation, No chest pain, pnd, or orthopnea. Resp: No cough, hemoptysis, wheezing and dyspnea  Musc/skeletal: no bone or joint complains. Neuro: No unilateral weakness, numbness, tingling. No seizures. Objective:      Visit Vitals  /69 (BP 1 Location: Left arm, BP Patient Position: At rest)   Pulse (!) 102   Temp 98.1 °F (36.7 °C)   Resp 17   Ht 5' 5\" (1.651 m)   Wt 122 kg (269 lb)   SpO2 96%   BMI 44.76 kg/m²     Body mass index is 44.76 kg/m². Physical Exam:  General Appearance: Comfortable, Not in any distress  HEENT: ROSENDO. HEAD: Atraumatic  NECK: No JVD, no thyroidomeglay. CAROTIDS: No bruit  LUNGS: Clear bilaterally. HEART: S1+S2 audible, no murmur, no pericardial rub. ABD: Non-tender, BS Audible    NEUROLOGICAL: Alert, awake, confused at times.   Medication:  Current Facility-Administered Medications   Medication Dose Route Frequency    clopidogreL (PLAVIX) tablet 75 mg  75 mg Oral DAILY    atorvastatin (LIPITOR) tablet 80 mg  80 mg Oral DAILY    amLODIPine (NORVASC) tablet 10 mg  10 mg Oral DAILY    losartan/hydroCHLOROthiazide (HYZAAR) 100/25 mg   Oral DAILY    cloNIDine (CATAPRES) 0.1 mg/24 hr patch 1 Patch  1 Patch TransDERmal Q7D    labetaloL (NORMODYNE;TRANDATE) 20 mg/4 mL (5 mg/mL) injection 20 mg  20 mg IntraVENous Q6H PRN    albuterol-ipratropium (DUO-NEB) 2.5 MG-0.5 MG/3 ML  3 mL Nebulization Q4H PRN    insulin lispro (HUMALOG) injection   SubCUTAneous Q6H    glucose chewable tablet 16 g  4 Tab Oral PRN    glucagon (GLUCAGEN) injection 1 mg  1 mg IntraMUSCular PRN    dextrose 10% infusion 125-250 mL  125-250 mL IntraVENous PRN    aspirin delayed-release tablet 81 mg  81 mg Oral DAILY    heparin (porcine) injection 5,000 Units  5,000 Units SubCUTAneous Q8H    sodium chloride (NS) flush 5-40 mL  5-40 mL IntraVENous Q8H    sodium chloride (NS) flush 5-40 mL  5-40 mL IntraVENous PRN    ondansetron (ZOFRAN) injection 4 mg  4 mg IntraVENous Q4H PRN               Lab/Data Reviewed:       Recent Labs     05/06/20  0425 05/05/20  0350   WBC 6.6 6.7   HGB 12.9 12.4   HCT 41.6 40.9    356     No results for input(s): NA, K, CL, CO2, GLU, BUN, CREA, CA in the last 72 hours.     Signed By: Adilene Blount MD     May 7, 2020

## 2020-05-08 NOTE — PROGRESS NOTES
DC Plan: Discharge to [facility] by medical transport today    Please include all hard scripts for controlled substances, med rec and dc summary in packet. Please medicate for pain prior to dc if possible and needed to help offset delay when patient first arrives to facility. Chart reviewed. Pt admitted to medical by hospitalist.  Spoke with MD Torres Skill, pt can dc to SNF today. Pt accepted at [facility]. Per MD Torres Skill, pt will not need Bipap in rehab. Spoke with pt on phone and she is aware of dc to SNF today to [facility] and she agrees with dc plan. Called and spoke with pts son Bonifacio Merlos on phone and he too is aware of dc plan, asking for call from [facility] liaison, Claudia Turcios @ [facility] made aware and will reach out to son. UAI completed. Unit secretary will assist with transport for around 11-12p. CM will cont to follow for transition of care needs [phone]    1120  Awaiting dc summary, transport pushed back to 1pm, Migue @ [facility] made aware    Transition of Care Plan:     Communication to Patient/Family:  Met with patient and family, and they are agreeable to the transition plan. The Plan for Transition of Care is related to the following treatment goals: SNF    The Patient and/or patient representative [address] was provided with a choice of provider and agrees   with the discharge plan. Yes [x] No []    Freedom of choice list was provided with basic dialogue that supports the patient's individualized plan of care/goals and shares the quality data associated with the providers. Yes [x] No []    SNF/Rehab Transition:  Patient has been accepted to [facility] at [address] [facility] [address] [facility] for SNF and meets criteria for admission. Patient will transported by medical transport and expected to leave at 11a-12p. Communication to SNF/Rehab:  Bedside RNBijan has been notified to update the transition plan to the facility and call report [phone].      Discharge information has been updated on the AVS and communicated through [facility] or Doron Wolff. Discharge instructions to be fax'd to facility at [phone]     Please include all hard scripts for controlled substances, med rec and dc summary, and AVS in packet. Please medicate for pain prior to dc if possible and needed to help offset delay when patient first arrives to facility. Reviewed and confirmed with facility, [facility] can manage the patient care needs for the following:     Livia Blackburn with (X) only those applicable:  Medication:  [x]Medications are available at the facility  []IV Antibiotics    [x]Controlled Substance  hard copies available sent. [x]Weekly Labs    Equipment:  []CPAP/BiPAP  []Wound Vacuum  []Schmitz or Urinary Device  []PICC/Central Line  []Nebulizer  []Ventilator    Treatment:  []Isolation (for MRSA, VRE, etc.)  []Surgical Drain Management  []Tracheostomy Care  []Dressing Changes  []Dialysis with transportation  []PEG Care  []Oxygen  []Daily Weights for Heart Failure    Dietary:  []Any diet limitations  []Tube Feedings   []Total Parenteral Management (TPN)    Financial Resources:  []Medicaid Application Completed    [x]UAI Completed and copy given to pt/family. []A screening has previously been completed. []Level II Completed    [] Private pay individual who will not become   financially eligible for Medicaid within 6 months from admission to a [address] [place] [address]. [] Individual refused to have screening conducted. []Medicaid Application Completed    []The screening denied because it was determined individual did not need/did not qualify for nursing facility level of care. [] Out of state residents seeking direct admission to a [address] facility.   [] Individuals who are inpatients of an out of state hospital, or in state or out of state veterans/ hospital and seek direct admission to a [address] facility  [] Individuals who are pateints or residents of a state owned/operated facility that is licensed by [address] ([facility]) and seek direct admission to [address] [place] [address]  [] A screening not required for enrollment in [facility] Hospice services as set out in [facility]-  [] [facility]) staff shall perform screenings of the [facility] clients. Advanced Care Plan:  []Surrogate Decision Maker of Care  []POA  []Communicated Code Status and copy sent. Other:         Care Management Interventions  PCP Verified by CM:  Yes  Transition of Care Consult (CM Consult): SNF  Physical Therapy Consult: Yes  Occupational Therapy Consult: Yes  Current Support Network: Relative's Home  Confirm Follow Up Transport: Family  The Plan for Transition of Care is Related to the Following Treatment Goals : SNF  The Patient and/or Patient Representative was Provided with a Choice of Provider and Agrees with the Discharge Plan?: Yes  Freedom of Choice List was Provided with Basic Dialogue that Supports the Patient's Individualized Plan of Care/Goals, Treatment Preferences and Shares the Quality Data Associated with the Providers?: Yes  Discharge Location  Discharge Placement: Skilled nursing facility

## 2020-05-08 NOTE — PROGRESS NOTES
0725  Bedside and Verbal shift change report given by IRMA Steele RN (off going nurse) to Tony Park RN (on coming nurse). Report included the following information SBAR, Kardex, OR Summary, Intake/Output and MAR    1215  TRANSFER - OUT REPORT:    Verbal report given to [facility], RN (name) on Cameron Morales  being transferred to [facility] (unit) for routine progression of care       Report consisted of patients Situation, Background, Assessment and   Recommendations(SBAR). Information from the following report(s) SBAR, Intake/Output, MAR and Recent Results was reviewed with the receiving nurse. Lines:       Opportunity for questions and clarification was provided.       Patient transported with:   Tech    Patient Vitals for the past 12 hrs:   Temp Pulse Resp BP SpO2   05/08/20 1120 98.3 °F (36.8 °C) 95 19 140/73 98 %   05/08/20 0719 98.7 °F (37.1 °C) 100 17 129/72 92 %

## 2020-05-08 NOTE — PROGRESS NOTES
NICHE Rounding Note  Patient: Ngoc Gordillo Minor female 81 y.o. Room: [facility]  Attending Provider: Shannon Kelley, *   Admission Date: 4/27/2020  Rounding Date and Time: 5/7/2020 8:44 PM  Participants: Rizwana Waterman, CNS; Omar Carias, RN    Janelle SPICES: Overall Assessment Tool for Older Adults    SPICES   Evidence of    Sleep disorder   []Yes  [x]No  Problems with Eating/Feed [x]Yes  []No  Incontinence   [x]Yes  []No  Confusion   [x]Yes  []No   Evidence of Falls  []Yes  [x]No   Skin Breakdown  []Yes  [x]No    Advanced Care Plan: [] On File  [x] Not on File   Code Status: Full Code    Length of Stay: 10 days. Readmission Risk: Low Risk            11       Total Score        3 Patient Length of Stay (>5 days = 3)    5 Pt. Coverage (Medicare=5 , Medicaid, or Self-Pay=4)    3 Charlson Comorbidity Score (Age + Comorbid Conditions)        Criteria that do not apply:    Has Seen PCP in Last 6 Months (Yes=3, No=0)    . Living with Significant Other. Assisted Living. LTAC. SNF. or   Rehab    IP Visits Last 12 Months (1-3=4, 4=9, >4=11)        Placement by CM []Yes  []No    Beers Criteria Medication: 0 with active orders. Given: none   Ordered, but not yet given: none     Cognition:    Orientation: x2 periodic confusion   Baseline: x2    Mobility:   Fall Risk: No flowsheet data found. Fall during this stay? []Yes  [x]No   PT consult: [x]Yes  []No    OT consult: [x]Yes  []No    Incontinence:    Bladder: [x]Yes  []No   Bowel:    [x]Yes  []No     Skin:   Intact:     [x]Yes  []No  Wound Consult: []Yes  [x]No    Sleep:     Impairment: []Yes  [x]No    Nutrition:   Feeding status: DIET DENTAL SOFT (SOFT SOLID)    MST Screening complete: [x]Yes  []No   Nutrition Consult: [x]Yes  []No     Subjective: Pt sleeping. Able to make needs known, though she is oriented x2. Interventions: Fall prophylaxis, alarm on, rails x3, bed lowest position. Avoid sedative. Lights off from [address].

## 2020-05-13 ENCOUNTER — HOSPITAL ENCOUNTER (OUTPATIENT)
Dept: LAB | Age: 70
Discharge: HOME | End: 2020-05-13
Payer: COMMERCIAL

## 2020-05-13 LAB
ANION GAP SERPL CALC-SCNC: 5 MMOL/L (ref 3–18)
BASOPHILS # BLD: 0 K/UL (ref 0–0.1)
BASOPHILS NFR BLD: 1 % (ref 0–2)
BNP SERPL-MCNC: 41 PG/ML (ref 0–900)
BUN SERPL-MCNC: 26 MG/DL (ref 7–18)
BUN/CREAT SERPL: 13 (ref 12–20)
CALCIUM SERPL-MCNC: 8.9 MG/DL (ref 8.5–10.1)
CHLORIDE SERPL-SCNC: 101 MMOL/L (ref 100–111)
CO2 SERPL-SCNC: 34 MMOL/L (ref 21–32)
CREAT SERPL-MCNC: 1.93 MG/DL (ref 0.6–1.3)
DIFFERENTIAL METHOD BLD: ABNORMAL
EOSINOPHIL # BLD: 0.2 K/UL (ref 0–0.4)
EOSINOPHIL NFR BLD: 4 % (ref 0–5)
ERYTHROCYTE [DISTWIDTH] IN BLOOD BY AUTOMATED COUNT: 16.4 % (ref 11.6–14.5)
GLUCOSE SERPL-MCNC: 100 MG/DL (ref 74–99)
HCT VFR BLD AUTO: 40.7 % (ref 35–45)
HGB BLD-MCNC: 12.3 G/DL (ref 12–16)
LYMPHOCYTES # BLD: 1.5 K/UL (ref 0.9–3.6)
LYMPHOCYTES NFR BLD: 24 % (ref 21–52)
MCH RBC QN AUTO: 23.3 PG (ref 24–34)
MCHC RBC AUTO-ENTMCNC: 30.2 G/DL (ref 31–37)
MCV RBC AUTO: 77.2 FL (ref 74–97)
MONOCYTES # BLD: 0.8 K/UL (ref 0.05–1.2)
MONOCYTES NFR BLD: 13 % (ref 3–10)
NEUTS SEG # BLD: 3.7 K/UL (ref 1.8–8)
NEUTS SEG NFR BLD: 58 % (ref 40–73)
PLATELET # BLD AUTO: 358 K/UL (ref 135–420)
PMV BLD AUTO: 11.2 FL (ref 9.2–11.8)
POTASSIUM SERPL-SCNC: 4.1 MMOL/L (ref 3.5–5.5)
RBC # BLD AUTO: 5.27 M/UL (ref 4.2–5.3)
SODIUM SERPL-SCNC: 140 MMOL/L (ref 136–145)
WBC # BLD AUTO: 6.3 K/UL (ref 4.6–13.2)

## 2020-05-13 PROCEDURE — 85025 COMPLETE CBC W/AUTO DIFF WBC: CPT

## 2020-05-13 PROCEDURE — 80048 BASIC METABOLIC PNL TOTAL CA: CPT

## 2020-05-13 PROCEDURE — 83880 ASSAY OF NATRIURETIC PEPTIDE: CPT

## 2020-05-14 ENCOUNTER — PATIENT OUTREACH (OUTPATIENT)
Dept: CASE MANAGEMENT | Age: 70
End: 2020-05-14

## 2020-05-14 NOTE — PROGRESS NOTES
Community Care Team Documentation for Patient in [facility]     Patient discharged from [facility] to [address] [place] [address], on 5/8/20. Hospital Discharge diagnosis:       1. Multiple acute strokes. 2.  Metabolic encephalopathy due to above. 3.  Hyperglycemia due to type 2 diabetes mellitus. 4.  Lung nodule. 5.  Hypertensive emergency. 6.  Pneumonia. 7.  Acute hypercapnic respiratory failure, resolved. 8.  Hypertension. SNF Attending Provider:      Anticipated discharge date from SNF:  TBD    PCP : Cookie Dhillon MD      [facility] Team rounds completed, updates provided by facility. Brief Summary of Care:    Patient receiving PT/OT. Some confusion at times. PT/OT until 5/29. Patient was living with sister but now son wants her elsewhere. Working to see if can get Medicaid and son working on [employer]. Dispo:  Patient was living with sister PTA. Son wants her living elsewhere. RRAT:  Low Risk            3       Total Score        3 Charlson Comorbidity Score (Age + Comorbid Conditions)        Criteria that do not apply:    Has Seen PCP in Last 6 Months (Yes=3, No=0)    . Living with Significant Other. Assisted Living. LTAC. SNF. or   Rehab    Patient Length of Stay (>5 days = 3)    IP Visits Last 12 Months (1-3=4, 4=9, >4=11)    Pt.  Coverage (Medicare=5 , Medicaid, or Self-Pay=4)        Active Ambulatory Problems     Diagnosis Date Noted    Elevated troponin 04/27/2020    Hypertensive emergency 04/27/2020    Hypokalemia 04/28/2020    Lung nodule 04/28/2020    CAP (community acquired pneumonia) 50/74/9091    Metabolic encephalopathy 62/40/6272    Hyperglycemia due to type 2 diabetes mellitus ([address] 04/30/2020    Acute respiratory failure with hypercapnia ([facility] [address] 04/30/2020    Cerebral infarction ([address] 05/04/2020     Resolved Ambulatory Problems     Diagnosis Date Noted    No Resolved Ambulatory Problems     Past Medical History:   Diagnosis Date    Hypertension

## 2020-05-15 ENCOUNTER — HOSPITAL ENCOUNTER (OUTPATIENT)
Dept: LAB | Age: 70
Discharge: HOME | End: 2020-05-15
Payer: MEDICARE

## 2020-05-15 PROCEDURE — 80048 BASIC METABOLIC PNL TOTAL CA: CPT

## 2020-05-18 LAB
ANION GAP SERPL CALC-SCNC: 5 MMOL/L (ref 3–18)
BUN SERPL-MCNC: 14 MG/DL (ref 7–18)
BUN/CREAT SERPL: 12 (ref 12–20)
CALCIUM SERPL-MCNC: 9.1 MG/DL (ref 8.5–10.1)
CHLORIDE SERPL-SCNC: 100 MMOL/L (ref 100–111)
CO2 SERPL-SCNC: 35 MMOL/L (ref 21–32)
CREAT SERPL-MCNC: 1.14 MG/DL (ref 0.6–1.3)
GLUCOSE SERPL-MCNC: 101 MG/DL (ref 74–99)
POTASSIUM SERPL-SCNC: 3.9 MMOL/L (ref 3.5–5.5)
SODIUM SERPL-SCNC: 140 MMOL/L (ref 136–145)

## 2020-05-21 ENCOUNTER — PATIENT OUTREACH (OUTPATIENT)
Dept: CASE MANAGEMENT | Age: 70
End: 2020-05-21

## 2020-05-21 NOTE — PROGRESS NOTES
Community Care Team Documentation for Patient in [facility]     Patient discharged from [facility] to [address] [place] [address], on 5/8/20. Hospital Discharge diagnosis:       1. Multiple acute strokes. 2.  Metabolic encephalopathy due to above. 3.  Hyperglycemia due to type 2 diabetes mellitus. 4.  Lung nodule. 5.  Hypertensive emergency. 6.  Pneumonia. 7.  Acute hypercapnic respiratory failure, resolved. 8.  Hypertension. SNF Attending Provider:      Anticipated discharge date from SNF:  TBD    PCP : Dennis Jackson MD      [facility] Team rounds completed, updates provided by facility. Brief Summary of Care:    Patient receiving PT/OT. Some confusion at times. PT/OT until 5/29. Patient was living with sister but now son wants her elsewhere. Working to see if can get Medicaid and son working on [employer]. Dispo:  Patient was living with sister PTA. Son wants her living elsewhere. RRAT:  Low Risk            3       Total Score        3 Charlson Comorbidity Score (Age + Comorbid Conditions)        Criteria that do not apply:    Has Seen PCP in Last 6 Months (Yes=3, No=0)    . Living with Significant Other. Assisted Living. LTAC. SNF. or   Rehab    Patient Length of Stay (>5 days = 3)    IP Visits Last 12 Months (1-3=4, 4=9, >4=11)    Pt.  Coverage (Medicare=5 , Medicaid, or Self-Pay=4)        Active Ambulatory Problems     Diagnosis Date Noted    Elevated troponin 04/27/2020    Hypertensive emergency 04/27/2020    Hypokalemia 04/28/2020    Lung nodule 04/28/2020    CAP (community acquired pneumonia) 76/32/6168    Metabolic encephalopathy 95/93/1303    Hyperglycemia due to type 2 diabetes mellitus ([address] 04/30/2020    Acute respiratory failure with hypercapnia ([facility] [address] 04/30/2020    Cerebral infarction ([address] 05/04/2020     Resolved Ambulatory Problems     Diagnosis Date Noted    No Resolved Ambulatory Problems     Past Medical History:   Diagnosis Date    Hypertension

## 2020-05-29 ENCOUNTER — PATIENT OUTREACH (OUTPATIENT)
Dept: CASE MANAGEMENT | Age: 70
End: 2020-05-29

## 2020-05-29 NOTE — PROGRESS NOTES
Community Care Team Documentation for Patient in [facility]     Patient discharged from [address] to [address] [place] [address], on 5/8/20. Hospital Discharge diagnosis:       1. Multiple acute strokes. 2.  Metabolic encephalopathy due to above. 3.  Hyperglycemia due to type 2 diabetes mellitus. 4.  Lung nodule. 5.  Hypertensive emergency. 6.  Pneumonia. 7.  Acute hypercapnic respiratory failure, resolved. 8.  Hypertension. SNF Attending Provider:      Anticipated discharge date from SNF:  TBD    PCP : Claudia Clemens MD      [facility] Team rounds completed, updates provided by facility. Brief Summary of Care:    Patient receiving PT/OT. Some confusion at times. PT/OT until 5/29. Patient was living with sister but now son wants her elsewhere. Medicaid yadira completed. Son working on [facility]. Dispo:  Patient was living with sister PTA. Pt will be going home with son and sister now. Will need to be set up for PCA upon discharge. No HH selected yet. RRAT:  Low Risk            3       Total Score        3 Charlson Comorbidity Score (Age + Comorbid Conditions)        Criteria that do not apply:    Has Seen PCP in Last 6 Months (Yes=3, No=0)    . Living with Significant Other. Assisted Living. LTAC. SNF. or   Rehab    Patient Length of Stay (>5 days = 3)    IP Visits Last 12 Months (1-3=4, 4=9, >4=11)    Pt.  Coverage (Medicare=5 , Medicaid, or Self-Pay=4)        Active Ambulatory Problems     Diagnosis Date Noted    Elevated troponin 04/27/2020    Hypertensive emergency 04/27/2020    Hypokalemia 04/28/2020    Lung nodule 04/28/2020    CAP (community acquired pneumonia) 00/75/7415    Metabolic encephalopathy 92/51/2490    Hyperglycemia due to type 2 diabetes mellitus ([address] 04/30/2020    Acute respiratory failure with hypercapnia ([facility] [address] [facility] 04/30/2020    Cerebral infarction ([address] 05/04/2020 Resolved Ambulatory Problems     Diagnosis Date Noted    No Resolved Ambulatory Problems     Past Medical History:   Diagnosis Date    Hypertension

## 2020-06-05 ENCOUNTER — PATIENT OUTREACH (OUTPATIENT)
Dept: CASE MANAGEMENT | Age: 70
End: 2020-06-05

## 2020-06-05 NOTE — PROGRESS NOTES
Community Care Team Documentation for Patient in [facility]     Patient discharged from [address] to [address] [place] [address], on 5/8/20. Hospital Discharge diagnosis:       1. Multiple acute strokes. 2.  Metabolic encephalopathy due to above. 3.  Hyperglycemia due to type 2 diabetes mellitus. 4.  Lung nodule. 5.  Hypertensive emergency. 6.  Pneumonia. 7.  Acute hypercapnic respiratory failure, resolved. 8.  Hypertension. SNF Attending Provider:      Anticipated discharge date from SNF:  TBD    PCP : Ana Lopez MD      [facility] Team rounds completed, updates provided by facility. Brief Summary of Care:    Patient receiving PT/OT. Some confusion at times. Making some progress in therapy. Dispo: Will be going home with son and sister now. Medicaid yadira done. Son to arrange PCA once pt is discharged. No HH selected as of yet. RRAT:  Low Risk            3       Total Score        3 Charlson Comorbidity Score (Age + Comorbid Conditions)        Criteria that do not apply:    Has Seen PCP in Last 6 Months (Yes=3, No=0)    . Living with Significant Other. Assisted Living. LTAC. SNF. or   Rehab    Patient Length of Stay (>5 days = 3)    IP Visits Last 12 Months (1-3=4, 4=9, >4=11)    Pt.  Coverage (Medicare=5 , Medicaid, or Self-Pay=4)        Active Ambulatory Problems     Diagnosis Date Noted    Elevated troponin 04/27/2020    Hypertensive emergency 04/27/2020    Hypokalemia 04/28/2020    Lung nodule 04/28/2020    CAP (community acquired pneumonia) 38/12/3708    Metabolic encephalopathy 85/77/5956    Hyperglycemia due to type 2 diabetes mellitus ([address] 04/30/2020    Acute respiratory failure with hypercapnia ([address] 04/30/2020    Cerebral infarction ([address] 05/04/2020     Resolved Ambulatory Problems     Diagnosis Date Noted    No Resolved Ambulatory Problems     Past Medical History: Diagnosis Date    Hypertension

## 2020-06-12 ENCOUNTER — PATIENT OUTREACH (OUTPATIENT)
Dept: CASE MANAGEMENT | Age: 70
End: 2020-06-12

## 2020-06-12 NOTE — PROGRESS NOTES
Community Care Team Documentation for Patient in [facility]     Patient discharged from [address] to [address] [place] [address] on 5/8/20. Hospital Discharge diagnosis:       1. Multiple acute strokes. 2.  Metabolic encephalopathy due to above. 3.  Hyperglycemia due to type 2 diabetes mellitus. 4.  Lung nodule. 5.  Hypertensive emergency. 6.  Pneumonia. 7.  Acute hypercapnic respiratory failure, resolved. 8.  Hypertension. SNF Attending Provider:      Anticipated discharge date from SNF: 6/19/20    PCP : She Matthews MD      [facility] Team rounds completed, updates provided by facility. Brief Summary of Care:    Patient receiving PT/OT. Some confusion at times. Making some progress in therapy. Ambulating with walker. Dispo: Will be going home with son and sister now. Medicaid yadira done. Son to arrange PCA once pt is discharged. No HH selected as of yet. - Looking for agency that can do [place] and PCA. RRAT:  Low Risk            3       Total Score        3 Charlson Comorbidity Score (Age + Comorbid Conditions)        Criteria that do not apply:    Has Seen PCP in Last 6 Months (Yes=3, No=0)    . Living with Significant Other. Assisted Living. LTAC. SNF. or   Rehab    Patient Length of Stay (>5 days = 3)    IP Visits Last 12 Months (1-3=4, 4=9, >4=11)    Pt.  Coverage (Medicare=5 , Medicaid, or Self-Pay=4)        Active Ambulatory Problems     Diagnosis Date Noted    Elevated troponin 04/27/2020    Hypertensive emergency 04/27/2020    Hypokalemia 04/28/2020    Lung nodule 04/28/2020    CAP (community acquired pneumonia) 69/48/2109    Metabolic encephalopathy 76/74/9690    Hyperglycemia due to type 2 diabetes mellitus ([address] 04/30/2020    Acute respiratory failure with hypercapnia ([address] 04/30/2020    Cerebral infarction ([address] 05/04/2020     Resolved Ambulatory Problems     Diagnosis Date Noted    No Resolved Ambulatory Problems     Past Medical History:   Diagnosis Date    Hypertension

## 2020-06-18 ENCOUNTER — PATIENT OUTREACH (OUTPATIENT)
Dept: CASE MANAGEMENT | Age: 70
End: 2020-06-18

## 2020-06-23 NOTE — PROGRESS NOTES
Community Care Team Documentation for Patient in [facility]     Patient discharged from [facility] to [address] [place] [address], on 5/8/20. Hospital Discharge diagnosis:       1. Multiple acute strokes. 2.  Metabolic encephalopathy due to above. 3.  Hyperglycemia due to type 2 diabetes mellitus. 4.  Lung nodule. 5.  Hypertensive emergency. 6.  Pneumonia. 7.  Acute hypercapnic respiratory failure, resolved. 8.  Hypertension. SNF Attending Provider:      Anticipated discharge date from SNF: 6/18/20    PCP : Malachi Zelaya MD      [facility] Team rounds completed, updates provided by facility. Brief Summary of Care:    Patient receiving PT/OT. Some confusion at times. Making some progress in therapy. Ambulating with walker. Dispo:  Patient will discharge home today with her son and pt's sister. Medicaid yadira done. Son to arrange PCA once pt is discharged. They will personal touch for [facility] and PCA agency. RRAT:  Low Risk            3       Total Score        3 Charlson Comorbidity Score (Age + Comorbid Conditions)        Criteria that do not apply:    Has Seen PCP in Last 6 Months (Yes=3, No=0)    . Living with Significant Other. Assisted Living. LTAC. SNF. or   Rehab    Patient Length of Stay (>5 days = 3)    IP Visits Last 12 Months (1-3=4, 4=9, >4=11)    Pt.  Coverage (Medicare=5 , Medicaid, or Self-Pay=4)        Active Ambulatory Problems     Diagnosis Date Noted    Elevated troponin 04/27/2020    Hypertensive emergency 04/27/2020    Hypokalemia 04/28/2020    Lung nodule 04/28/2020    CAP (community acquired pneumonia) 44/31/8529    Metabolic encephalopathy 15/04/3018    Hyperglycemia due to type 2 diabetes mellitus ([address] 04/30/2020    Acute respiratory failure with hypercapnia ([address] 04/30/2020    Cerebral infarction ([address] 05/04/2020     Resolved Ambulatory Problems     Diagnosis Date Noted    No Resolved Ambulatory Problems     Past Medical History:   Diagnosis Date    Hypertension

## 2022-03-18 PROBLEM — E87.6 HYPOKALEMIA: Status: ACTIVE | Noted: 2020-04-28

## 2022-03-18 PROBLEM — E11.65 HYPERGLYCEMIA DUE TO TYPE 2 DIABETES MELLITUS (HCC): Status: ACTIVE | Noted: 2020-04-30

## 2022-03-19 PROBLEM — R91.1 LUNG NODULE: Status: ACTIVE | Noted: 2020-04-28

## 2022-03-19 PROBLEM — I16.1 HYPERTENSIVE EMERGENCY: Status: ACTIVE | Noted: 2020-04-27

## 2022-03-19 PROBLEM — R77.8 ELEVATED TROPONIN: Status: ACTIVE | Noted: 2020-04-27

## 2022-03-19 PROBLEM — J96.02 ACUTE RESPIRATORY FAILURE WITH HYPERCAPNIA (HCC): Status: ACTIVE | Noted: 2020-04-30

## 2022-03-19 PROBLEM — G93.41 METABOLIC ENCEPHALOPATHY: Status: ACTIVE | Noted: 2020-04-30

## 2022-03-20 PROBLEM — J18.9 CAP (COMMUNITY ACQUIRED PNEUMONIA): Status: ACTIVE | Noted: 2020-04-28

## 2022-03-20 PROBLEM — I63.9 CEREBRAL INFARCTION (HCC): Status: ACTIVE | Noted: 2020-05-04

## 2023-05-12 RX ORDER — ASPIRIN 81 MG/1
TABLET ORAL DAILY
COMMUNITY
Start: 2020-05-09

## 2023-05-12 RX ORDER — AMLODIPINE BESYLATE 10 MG/1
TABLET ORAL DAILY
COMMUNITY

## 2023-05-12 RX ORDER — CLONIDINE 0.1 MG/24H
1 PATCH, EXTENDED RELEASE TRANSDERMAL
COMMUNITY
Start: 2020-05-09

## 2023-05-12 RX ORDER — ATORVASTATIN CALCIUM 80 MG/1
TABLET, FILM COATED ORAL DAILY
COMMUNITY
Start: 2020-05-09